# Patient Record
Sex: MALE | Race: WHITE | NOT HISPANIC OR LATINO | Employment: OTHER | URBAN - NONMETROPOLITAN AREA
[De-identification: names, ages, dates, MRNs, and addresses within clinical notes are randomized per-mention and may not be internally consistent; named-entity substitution may affect disease eponyms.]

---

## 2017-01-18 ENCOUNTER — OFFICE VISIT (OUTPATIENT)
Dept: INTERNAL MEDICINE | Facility: CLINIC | Age: 62
End: 2017-01-18

## 2017-01-18 ENCOUNTER — LAB (OUTPATIENT)
Dept: INTERNAL MEDICINE | Facility: CLINIC | Age: 62
End: 2017-01-18

## 2017-01-18 VITALS
DIASTOLIC BLOOD PRESSURE: 90 MMHG | TEMPERATURE: 98.2 F | OXYGEN SATURATION: 97 % | SYSTOLIC BLOOD PRESSURE: 150 MMHG | HEART RATE: 81 BPM

## 2017-01-18 DIAGNOSIS — E55.9 VITAMIN D DEFICIENCY: ICD-10-CM

## 2017-01-18 DIAGNOSIS — J00 ACUTE NASOPHARYNGITIS: Primary | ICD-10-CM

## 2017-01-18 DIAGNOSIS — E78.5 HYPERLIPIDEMIA, UNSPECIFIED HYPERLIPIDEMIA TYPE: Primary | ICD-10-CM

## 2017-01-18 DIAGNOSIS — I10 ESSENTIAL HYPERTENSION: ICD-10-CM

## 2017-01-18 LAB
25(OH)D3+25(OH)D2 SERPL-MCNC: 44.7 NG/ML
ALBUMIN SERPL-MCNC: 4.2 G/DL (ref 3.2–4.8)
ALBUMIN/GLOB SERPL: 1.5 G/DL (ref 1.5–2.5)
ALP SERPL-CCNC: 64 U/L (ref 25–100)
ALT SERPL-CCNC: 22 U/L (ref 7–40)
AST SERPL-CCNC: 21 U/L (ref 0–33)
BASOPHILS # BLD AUTO: 0.03 10*3/MM3 (ref 0–0.2)
BASOPHILS NFR BLD AUTO: 0.4 % (ref 0–1)
BILIRUB SERPL-MCNC: 0.8 MG/DL (ref 0.3–1.2)
BUN SERPL-MCNC: 9 MG/DL (ref 9–23)
BUN/CREAT SERPL: 12.9 (ref 7–25)
CALCIUM SERPL-MCNC: 9.6 MG/DL (ref 8.7–10.4)
CHLORIDE SERPL-SCNC: 103 MMOL/L (ref 99–109)
CHOLEST SERPL-MCNC: 138 MG/DL (ref 0–200)
CO2 SERPL-SCNC: 27 MMOL/L (ref 20–31)
CREAT SERPL-MCNC: 0.7 MG/DL (ref 0.6–1.3)
EOSINOPHIL # BLD AUTO: 0.17 10*3/MM3 (ref 0.1–0.3)
EOSINOPHIL NFR BLD AUTO: 2.2 % (ref 0–3)
ERYTHROCYTE [DISTWIDTH] IN BLOOD BY AUTOMATED COUNT: 13.3 % (ref 11.3–14.5)
GLOBULIN SER CALC-MCNC: 2.8 GM/DL
GLUCOSE SERPL-MCNC: 92 MG/DL (ref 70–100)
HCT VFR BLD AUTO: 47.3 % (ref 38.9–50.9)
HDLC SERPL-MCNC: 83 MG/DL (ref 40–60)
HGB BLD-MCNC: 16.8 G/DL (ref 13.1–17.5)
IMM GRANULOCYTES # BLD: 0.02 10*3/MM3 (ref 0–0.03)
IMM GRANULOCYTES NFR BLD: 0.3 % (ref 0–0.6)
LDLC SERPL CALC-MCNC: 47 MG/DL (ref 0–100)
LYMPHOCYTES # BLD AUTO: 1.93 10*3/MM3 (ref 0.6–4.8)
LYMPHOCYTES NFR BLD AUTO: 25.4 % (ref 24–44)
MCH RBC QN AUTO: 35.2 PG (ref 27–31)
MCHC RBC AUTO-ENTMCNC: 35.5 G/DL (ref 32–36)
MCV RBC AUTO: 99.2 FL (ref 80–99)
MONOCYTES # BLD AUTO: 0.62 10*3/MM3 (ref 0–1)
MONOCYTES NFR BLD AUTO: 8.2 % (ref 0–12)
NEUTROPHILS # BLD AUTO: 4.83 10*3/MM3 (ref 1.5–8.3)
NEUTROPHILS NFR BLD AUTO: 63.5 % (ref 41–71)
PLATELET # BLD AUTO: 272 10*3/MM3 (ref 150–450)
POTASSIUM SERPL-SCNC: 5 MMOL/L (ref 3.5–5.5)
PROT SERPL-MCNC: 7 G/DL (ref 5.7–8.2)
RBC # BLD AUTO: 4.77 10*6/MM3 (ref 4.2–5.76)
SODIUM SERPL-SCNC: 145 MMOL/L (ref 132–146)
TRIGL SERPL-MCNC: 39 MG/DL (ref 0–150)
VLDLC SERPL CALC-MCNC: 7.8 MG/DL
WBC # BLD AUTO: 7.6 10*3/MM3 (ref 3.5–10.8)

## 2017-01-18 PROCEDURE — 99213 OFFICE O/P EST LOW 20 MIN: CPT | Performed by: INTERNAL MEDICINE

## 2017-01-18 PROCEDURE — 36415 COLL VENOUS BLD VENIPUNCTURE: CPT | Performed by: INTERNAL MEDICINE

## 2017-01-18 NOTE — MR AVS SNAPSHOT
Lebron Miller   1/18/2017 2:45 PM   Office Visit    Provider:  Marilyn K Vermeesch, MD   Department:  [facility] PRIMARY CARE   Dept Phone:  [phone]                Your Full Care Plan              Your Updated Medication List          This list is accurate as of: 1/18/17  4:41 PM.  Always use your most recent med list.                aspirin 81 MG tablet       carvedilol 25 MG tablet   Commonly known as:  COREG   TAKE 1 TABLET TWICE DAILY, WITH MORNING AND EVENING MEAL       CloNIDine 0.2 MG tablet   Commonly known as:  CATAPRES   TAKE 1 TABLET BY MOUTH EVERY DAY AT BEDTIME       clopidogrel 75 MG tablet   Commonly known as:  PLAVIX   TAKE 1 TABLET BY MOUTH EVERY DAY       famotidine 20 MG tablet   Commonly known as:  PEPCID   Take 1 tablet by mouth Daily.       hydrochlorothiazide 25 MG tablet   Commonly known as:  HYDRODIURIL   TAKE 1 TABELT BY MOUTH EVERY DAY       lisinopril 40 MG tablet   Commonly known as:  PRINIVIL,ZESTRIL   TAKE ONE TABLET BY MOUTH EVERY DAY       sertraline 50 MG tablet   Commonly known as:  ZOLOFT   TAKE 1 TABLET BY MOUTH EVERY DAY       simvastatin 80 MG tablet   Commonly known as:  ZOCOR   Take 1 tablet by mouth Every Night.       traMADol 50 MG tablet   Commonly known as:  ULTRAM   TAKE 1 TABLET BY MOUTH THREE TIMES A DAY AS NEEDED FOR PAIN       Vitamin D 2000 UNITS tablet               You Were Diagnosed With        Codes Comments    Acute nasopharyngitis    -  Primary ICD-10-CM: J00  ICD-9-CM: 460       Instructions     None    Patient Instructions History      MyChart Signup     Our records indicate that [facility] have [facility]. If you would like to sign up for [facility], please email [email or web address] or call [phone] to obtain an activation code.             Other Info from Your Visit           Your Appointments     Apr 18, 2017  2:15 PM EDT   Follow Up with Marilyn K Vermeesch, MD   [facility] (--)    [address] [address] [phone]           Arrive 15 minutes prior to appointment.              Allergies     No Known Allergies      Reason for Visit     Nasal Congestion sinus pressure in face, yellow mucus, and right ear pain X 1 week.       Vital Signs     Blood Pressure Pulse Temperature Oxygen Saturation Smoking Status       150/90 81 98.2 °F (36.8 °C) 97% Current Every Day Smoker       Problems and Diagnoses Noted     Acute nasopharyngitis

## 2017-01-18 NOTE — PROGRESS NOTES
Chief Complaint   Patient presents with   • Nasal Congestion     sinus pressure in face, yellow mucus, and right ear pain X 1 week.      Leonel Miller is a 61 y.o. male.     HPI Comments: 1 week history of sinus congestion, pressure, EA on right side.  No cough.  No NVD.  No ST. Only had right cheek pain and pressure.  No fevers, chills or aches.  Symptoms are all starting to improve.  He did not take anything for sxs.            The following portions of the patient's history were reviewed and updated as appropriate: allergies, current medications, past family history, past medical history, past social history, past surgical history and problem list.    Review of Systems   Constitutional: Negative for chills and fever.   HENT: Positive for congestion, ear pain, postnasal drip and sinus pressure. Negative for sore throat.    Respiratory: Negative for cough.        Objective   Visit Vitals   • /90   • Pulse 81   • Temp 98.2 °F (36.8 °C)   • SpO2 97%     There is no height or weight on file to calculate BMI.  Physical Exam   Constitutional: He is oriented to person, place, and time. He appears well-developed and well-nourished.   HENT:   Head: Normocephalic and atraumatic.   Mouth/Throat: Oropharynx is clear and moist.   Turbinates red and inflamed with yellow rhinorrhea noted, white postnasal drip noted, no sinus tenderness to palpation, right tympanic membrane is slightly red and dull, left tympanic membrane is normal   Eyes: Conjunctivae and EOM are normal. Pupils are equal, round, and reactive to light.   Neck: Normal range of motion. Neck supple. No thyromegaly present.   Cardiovascular: Normal rate, regular rhythm and normal heart sounds.    No murmur heard.  Pulmonary/Chest: Effort normal and breath sounds normal. No respiratory distress.   Lymphadenopathy:     He has no cervical adenopathy.   Neurological: He is alert and oriented to person, place, and time. No cranial nerve deficit.    Psychiatric: He has a normal mood and affect. Judgment normal.   Nursing note and vitals reviewed.      Assessment/Plan   Lebron Miller is here today and the following problems have been addressed:      Lebron was seen today for nasal congestion.    Diagnoses and all orders for this visit:    Acute nasopharyngitis    Recommend coricidin HBP for current sxs  He needs to take meds today, has not taken yet and BP is elevated  Increase fluids and rest  If symptoms recur or earache worsens, he is to call clinic and I will call in amoxicillin    RTC if sxs worsen or persist

## 2017-02-20 RX ORDER — TRAMADOL HYDROCHLORIDE 50 MG/1
TABLET ORAL
Qty: 90 TABLET | Refills: 0 | Status: SHIPPED | OUTPATIENT
Start: 2017-02-20 | End: 2017-03-15

## 2017-03-16 RX ORDER — TRAMADOL HYDROCHLORIDE 50 MG/1
TABLET ORAL
Qty: 90 TABLET | Refills: 2 | Status: SHIPPED | OUTPATIENT
Start: 2017-03-16 | End: 2017-05-25

## 2017-03-28 RX ORDER — CLOPIDOGREL BISULFATE 75 MG/1
TABLET ORAL
Qty: 30 TABLET | Refills: 4 | Status: SHIPPED | OUTPATIENT
Start: 2017-03-28 | End: 2017-08-29

## 2017-03-28 RX ORDER — HYDROCHLOROTHIAZIDE 25 MG/1
TABLET ORAL
Qty: 30 TABLET | Refills: 4 | Status: SHIPPED | OUTPATIENT
Start: 2017-03-28 | End: 2017-08-29

## 2017-03-28 RX ORDER — CARVEDILOL 25 MG/1
TABLET ORAL
Qty: 60 TABLET | Refills: 4 | Status: SHIPPED | OUTPATIENT
Start: 2017-03-28 | End: 2017-08-29

## 2017-04-26 RX ORDER — LISINOPRIL 40 MG/1
TABLET ORAL
Qty: 30 TABLET | Refills: 6 | Status: SHIPPED | OUTPATIENT
Start: 2017-04-26 | End: 2017-11-30 | Stop reason: SDUPTHER

## 2017-05-04 ENCOUNTER — OFFICE VISIT (OUTPATIENT)
Dept: INTERNAL MEDICINE | Facility: CLINIC | Age: 62
End: 2017-05-04

## 2017-05-04 VITALS
HEIGHT: 68 IN | WEIGHT: 169 LBS | TEMPERATURE: 98.8 F | OXYGEN SATURATION: 98 % | BODY MASS INDEX: 25.61 KG/M2 | HEART RATE: 72 BPM | SYSTOLIC BLOOD PRESSURE: 152 MMHG | DIASTOLIC BLOOD PRESSURE: 103 MMHG

## 2017-05-04 DIAGNOSIS — I10 ESSENTIAL HYPERTENSION: Primary | ICD-10-CM

## 2017-05-04 DIAGNOSIS — E55.9 VITAMIN D DEFICIENCY: ICD-10-CM

## 2017-05-04 DIAGNOSIS — F32.1 MODERATE SINGLE CURRENT EPISODE OF MAJOR DEPRESSIVE DISORDER (HCC): ICD-10-CM

## 2017-05-04 DIAGNOSIS — Z72.0 TOBACCO ABUSE: ICD-10-CM

## 2017-05-04 DIAGNOSIS — E78.2 MIXED HYPERLIPIDEMIA: ICD-10-CM

## 2017-05-04 DIAGNOSIS — I25.10 CHRONIC CORONARY ARTERY DISEASE: ICD-10-CM

## 2017-05-04 PROBLEM — J00 ACUTE NASOPHARYNGITIS: Status: RESOLVED | Noted: 2017-01-18 | Resolved: 2017-05-04

## 2017-05-04 PROCEDURE — 99214 OFFICE O/P EST MOD 30 MIN: CPT | Performed by: INTERNAL MEDICINE

## 2017-05-25 RX ORDER — FAMOTIDINE 20 MG/1
20 TABLET, FILM COATED ORAL DAILY
Qty: 30 TABLET | Refills: 11 | Status: SHIPPED | OUTPATIENT
Start: 2017-05-25 | End: 2018-06-04 | Stop reason: SDUPTHER

## 2017-05-25 RX ORDER — SIMVASTATIN 80 MG
80 TABLET ORAL NIGHTLY
Qty: 30 TABLET | Refills: 5 | Status: SHIPPED | OUTPATIENT
Start: 2017-05-25 | End: 2018-01-02 | Stop reason: SDUPTHER

## 2017-05-25 RX ORDER — TRAMADOL HYDROCHLORIDE 50 MG/1
TABLET ORAL
Qty: 90 TABLET | Refills: 2 | Status: SHIPPED | OUTPATIENT
Start: 2017-05-25 | End: 2017-05-26 | Stop reason: SDUPTHER

## 2017-05-26 RX ORDER — TRAMADOL HYDROCHLORIDE 50 MG/1
TABLET ORAL
Qty: 90 TABLET | Refills: 2 | OUTPATIENT
Start: 2017-05-26 | End: 2017-09-25 | Stop reason: SDUPTHER

## 2017-06-15 ENCOUNTER — OFFICE VISIT (OUTPATIENT)
Dept: INTERNAL MEDICINE | Facility: CLINIC | Age: 62
End: 2017-06-15

## 2017-06-15 VITALS
HEART RATE: 86 BPM | TEMPERATURE: 98.6 F | HEIGHT: 68 IN | BODY MASS INDEX: 25.33 KG/M2 | SYSTOLIC BLOOD PRESSURE: 148 MMHG | DIASTOLIC BLOOD PRESSURE: 90 MMHG | OXYGEN SATURATION: 98 % | WEIGHT: 167.13 LBS

## 2017-06-15 DIAGNOSIS — I10 ESSENTIAL HYPERTENSION: Primary | ICD-10-CM

## 2017-06-15 PROCEDURE — 99213 OFFICE O/P EST LOW 20 MIN: CPT | Performed by: INTERNAL MEDICINE

## 2017-07-11 RX ORDER — CLONIDINE HYDROCHLORIDE 0.2 MG/1
TABLET ORAL
Qty: 30 TABLET | Refills: 8 | Status: SHIPPED | OUTPATIENT
Start: 2017-07-11 | End: 2018-02-28

## 2017-08-29 RX ORDER — HYDROCHLOROTHIAZIDE 25 MG/1
TABLET ORAL
Qty: 30 TABLET | Refills: 4 | Status: SHIPPED | OUTPATIENT
Start: 2017-08-29 | End: 2018-02-01 | Stop reason: SDUPTHER

## 2017-08-29 RX ORDER — CLOPIDOGREL BISULFATE 75 MG/1
TABLET ORAL
Qty: 30 TABLET | Refills: 4 | Status: SHIPPED | OUTPATIENT
Start: 2017-08-29 | End: 2018-02-01 | Stop reason: SDUPTHER

## 2017-08-29 RX ORDER — CARVEDILOL 25 MG/1
TABLET ORAL
Qty: 60 TABLET | Refills: 4 | Status: SHIPPED | OUTPATIENT
Start: 2017-08-29 | End: 2018-02-01 | Stop reason: SDUPTHER

## 2017-08-31 ENCOUNTER — OFFICE VISIT (OUTPATIENT)
Dept: INTERNAL MEDICINE | Facility: CLINIC | Age: 62
End: 2017-08-31

## 2017-08-31 VITALS
DIASTOLIC BLOOD PRESSURE: 92 MMHG | HEART RATE: 68 BPM | SYSTOLIC BLOOD PRESSURE: 148 MMHG | HEIGHT: 68 IN | TEMPERATURE: 97.4 F | OXYGEN SATURATION: 97 % | WEIGHT: 168 LBS | BODY MASS INDEX: 25.46 KG/M2

## 2017-08-31 DIAGNOSIS — F32.1 MODERATE SINGLE CURRENT EPISODE OF MAJOR DEPRESSIVE DISORDER (HCC): ICD-10-CM

## 2017-08-31 DIAGNOSIS — R35.1 NOCTURIA: ICD-10-CM

## 2017-08-31 DIAGNOSIS — I10 ESSENTIAL HYPERTENSION: Primary | ICD-10-CM

## 2017-08-31 DIAGNOSIS — E55.9 VITAMIN D DEFICIENCY: ICD-10-CM

## 2017-08-31 DIAGNOSIS — J30.9 ATOPIC RHINITIS: ICD-10-CM

## 2017-08-31 DIAGNOSIS — E78.2 MIXED HYPERLIPIDEMIA: ICD-10-CM

## 2017-08-31 DIAGNOSIS — Z12.5 SCREENING PSA (PROSTATE SPECIFIC ANTIGEN): ICD-10-CM

## 2017-08-31 LAB — PSA SERPL-MCNC: 1.41 NG/ML (ref 0.06–4)

## 2017-08-31 PROCEDURE — 99213 OFFICE O/P EST LOW 20 MIN: CPT | Performed by: INTERNAL MEDICINE

## 2017-09-26 RX ORDER — TRAMADOL HYDROCHLORIDE 50 MG/1
TABLET ORAL
Qty: 90 TABLET | Refills: 2 | OUTPATIENT
Start: 2017-09-26 | End: 2017-09-28 | Stop reason: SDUPTHER

## 2017-09-27 RX ORDER — TRAMADOL HYDROCHLORIDE 50 MG/1
TABLET ORAL
Qty: 90 TABLET | Refills: 2 | Status: CANCELLED | OUTPATIENT
Start: 2017-09-27

## 2017-09-28 RX ORDER — TRAMADOL HYDROCHLORIDE 50 MG/1
50 TABLET ORAL 3 TIMES DAILY PRN
Qty: 90 TABLET | Refills: 2 | OUTPATIENT
Start: 2017-09-28 | End: 2018-01-29 | Stop reason: SDUPTHER

## 2017-11-30 RX ORDER — LISINOPRIL 40 MG/1
TABLET ORAL
Qty: 30 TABLET | Refills: 6 | Status: SHIPPED | OUTPATIENT
Start: 2017-11-30 | End: 2018-07-03 | Stop reason: SDUPTHER

## 2018-01-02 RX ORDER — SIMVASTATIN 80 MG
TABLET ORAL
Qty: 30 TABLET | Refills: 5 | Status: SHIPPED | OUTPATIENT
Start: 2018-01-02 | End: 2018-06-04 | Stop reason: SDUPTHER

## 2018-01-31 RX ORDER — TRAMADOL HYDROCHLORIDE 50 MG/1
TABLET ORAL
Qty: 90 TABLET | Refills: 2 | OUTPATIENT
Start: 2018-01-31 | End: 2018-02-28 | Stop reason: SDUPTHER

## 2018-02-02 RX ORDER — HYDROCHLOROTHIAZIDE 25 MG/1
TABLET ORAL
Qty: 30 TABLET | Refills: 4 | Status: SHIPPED | OUTPATIENT
Start: 2018-02-02 | End: 2018-07-03 | Stop reason: SDUPTHER

## 2018-02-02 RX ORDER — CARVEDILOL 25 MG/1
TABLET ORAL
Qty: 60 TABLET | Refills: 4 | Status: SHIPPED | OUTPATIENT
Start: 2018-02-02 | End: 2018-07-03 | Stop reason: SDUPTHER

## 2018-02-02 RX ORDER — CLOPIDOGREL BISULFATE 75 MG/1
TABLET ORAL
Qty: 30 TABLET | Refills: 4 | Status: SHIPPED | OUTPATIENT
Start: 2018-02-02 | End: 2018-07-03 | Stop reason: SDUPTHER

## 2018-02-28 ENCOUNTER — OFFICE VISIT (OUTPATIENT)
Dept: INTERNAL MEDICINE | Facility: CLINIC | Age: 63
End: 2018-02-28

## 2018-02-28 VITALS
OXYGEN SATURATION: 97 % | TEMPERATURE: 98.6 F | DIASTOLIC BLOOD PRESSURE: 80 MMHG | WEIGHT: 169 LBS | SYSTOLIC BLOOD PRESSURE: 148 MMHG | HEART RATE: 73 BPM | BODY MASS INDEX: 25.61 KG/M2 | HEIGHT: 68 IN

## 2018-02-28 DIAGNOSIS — I25.10 CHRONIC CORONARY ARTERY DISEASE: ICD-10-CM

## 2018-02-28 DIAGNOSIS — E78.2 MIXED HYPERLIPIDEMIA: ICD-10-CM

## 2018-02-28 DIAGNOSIS — E55.9 VITAMIN D DEFICIENCY: ICD-10-CM

## 2018-02-28 DIAGNOSIS — I10 ESSENTIAL HYPERTENSION: Primary | ICD-10-CM

## 2018-02-28 PROCEDURE — 99213 OFFICE O/P EST LOW 20 MIN: CPT | Performed by: INTERNAL MEDICINE

## 2018-02-28 NOTE — PROGRESS NOTES
Chief Complaint   Patient presents with   • Follow-up     6 months for HLD and HTN. No new complaints.     Subjective   Lebron Miller is a 63 y.o. male.     HPI Comments: Here for follow up of HTN, HLD, GERD, depression, CAD, allergies and cervical disc disease.   No CP, SOB or edema.  BP is elevated again.  He is not checking it at all anymore.   He has sinus drainage in AM produces a significant amount of mucus with cough.  He is smoking about 1-2 PPD.  He is considering quitting smoking.   Depression has been stable.  He never sleeps well, says he has done this his whole life.   He awakens to urinate 3 times.  No dribbling and has a good stream.  He tends to drink a lot before bed at night.   Drinks beer 2-3 nights a week.   He does have some neck pain, hand pain.  Takes tramadol 1 tab TID.  No signs of abuse.   He does not eat fast food.  Usually eats at home.  Loves his vegetables.   He does not exercise much.   Patient states that he used one of his friend's inhalers, but does not know what it was.  States that his breathing was improved within a few minutes.  He has not had a chest x-ray for quite some time and declines for me to do one at this time.  I offered to write for an albuterol inhaler, but he declines at this time.       The following portions of the patient's history were reviewed and updated as appropriate: allergies, current medications, past family history, past medical history, past social history, past surgical history and problem list.    Review of Systems   HENT: Positive for postnasal drip.    Respiratory: Positive for cough.    Cardiovascular: Negative for chest pain, palpitations and leg swelling.   Musculoskeletal: Positive for back pain and neck pain.   Psychiatric/Behavioral: Positive for sleep disturbance. Negative for dysphoric mood. The patient is not nervous/anxious.    All other systems reviewed and are negative.      Objective   /80  Pulse 73  Temp 98.6 °F (37 °C)  Ht  "172.7 cm (68\")  Wt 76.7 kg (169 lb)  SpO2 97%  BMI 25.7 kg/m2  Body mass index is 25.7 kg/(m^2).  Physical Exam   Constitutional: He is oriented to person, place, and time. He appears well-developed and well-nourished.   HENT:   Head: Normocephalic and atraumatic.   Mouth/Throat: Oropharynx is clear and moist.   Eyes: Conjunctivae and EOM are normal. Pupils are equal, round, and reactive to light.   Neck: Normal range of motion. Neck supple. No thyromegaly present.   Cardiovascular: Normal rate, regular rhythm, normal heart sounds and intact distal pulses.    No murmur heard.  Pulmonary/Chest: Effort normal. No respiratory distress. He has no wheezes. He has no rales.   Diminished breath sounds throughout   Abdominal: Soft. Bowel sounds are normal.   Musculoskeletal: He exhibits no edema.   Lymphadenopathy:     He has no cervical adenopathy.   Neurological: He is alert and oriented to person, place, and time. No cranial nerve deficit.   Psychiatric: Judgment normal.   Flat affect today, patient does not want to make much eye contact   Nursing note and vitals reviewed.      Assessment/Plan   Lebron Miller is here today and the following problems have been addressed:      Lebron was seen today for follow-up.    Diagnoses and all orders for this visit:    Essential hypertension  -     CBC & Differential  -     Comprehensive Metabolic Panel    Mixed hyperlipidemia  -     Comprehensive Metabolic Panel  -     Lipid Panel    Chronic coronary artery disease    Vitamin D deficiency  -     Vitamin D 25 Hydroxy    Other orders  -     CloNIDine (CATAPRES-TTS) 0.2 MG/24HR patch; Place 1 patch on the skin 1 (One) Time Per Week.    Follow heart healthy/low salt diet  Avoid processed foods  Monitor blood pressure as discussed, and bring readings to next office visit  Exercise as tolerated up to 30 minutes 5 days per week  Take all medications as prescribed  Labs as noted  Discontinue clonidine and start catapress patch .2mg once " a week due to elevated blood pressures  He will work on smoking cessation with patches  Offered to do chest x-ray and start inhaler, but patient declined at this time    RTC 4 weeks    Please note that portions of this note were completed with a voice recognition program.  Efforts were made to edit dictation, but occasionally words are mistranscribed.

## 2018-03-29 LAB
25(OH)D3+25(OH)D2 SERPL-MCNC: 41.6 NG/ML
ALBUMIN SERPL-MCNC: 4 G/DL (ref 3.5–5)
ALBUMIN/GLOB SERPL: 1.5 G/DL (ref 1–2)
ALP SERPL-CCNC: 52 U/L (ref 38–126)
ALT SERPL-CCNC: 28 U/L (ref 13–69)
AST SERPL-CCNC: 20 U/L (ref 15–46)
BASOPHILS # BLD AUTO: 0.05 10*3/MM3 (ref 0–0.2)
BASOPHILS NFR BLD AUTO: 0.8 % (ref 0–2.5)
BILIRUB SERPL-MCNC: 1 MG/DL (ref 0.2–1.3)
BUN SERPL-MCNC: 14 MG/DL (ref 7–20)
BUN/CREAT SERPL: 17.5 (ref 6.3–21.9)
CALCIUM SERPL-MCNC: 9.4 MG/DL (ref 8.4–10.2)
CHLORIDE SERPL-SCNC: 102 MMOL/L (ref 98–107)
CHOLEST SERPL-MCNC: 125 MG/DL (ref 0–199)
CO2 SERPL-SCNC: 29 MMOL/L (ref 26–30)
CREAT SERPL-MCNC: 0.8 MG/DL (ref 0.6–1.3)
EOSINOPHIL # BLD AUTO: 0.26 10*3/MM3 (ref 0–0.7)
EOSINOPHIL NFR BLD AUTO: 4.1 % (ref 0–7)
ERYTHROCYTE [DISTWIDTH] IN BLOOD BY AUTOMATED COUNT: 12.8 % (ref 11.5–14.5)
GFR SERPLBLD CREATININE-BSD FMLA CKD-EPI: 118 ML/MIN/1.73
GFR SERPLBLD CREATININE-BSD FMLA CKD-EPI: 98 ML/MIN/1.73
GLOBULIN SER CALC-MCNC: 2.7 GM/DL
GLUCOSE SERPL-MCNC: 90 MG/DL (ref 74–98)
HCT VFR BLD AUTO: 45.8 % (ref 42–52)
HDLC SERPL-MCNC: 86 MG/DL (ref 40–60)
HGB BLD-MCNC: 16.5 G/DL (ref 14–18)
IMM GRANULOCYTES # BLD: 0.03 10*3/MM3 (ref 0–0.06)
IMM GRANULOCYTES NFR BLD: 0.5 % (ref 0–0.6)
LDLC SERPL CALC-MCNC: 32 MG/DL (ref 0–99)
LYMPHOCYTES # BLD AUTO: 1.62 10*3/MM3 (ref 0.6–3.4)
LYMPHOCYTES NFR BLD AUTO: 25.6 % (ref 10–50)
MCH RBC QN AUTO: 35 PG (ref 27–31)
MCHC RBC AUTO-ENTMCNC: 36 G/DL (ref 30–37)
MCV RBC AUTO: 97 FL (ref 80–94)
MONOCYTES # BLD AUTO: 0.59 10*3/MM3 (ref 0–0.9)
MONOCYTES NFR BLD AUTO: 9.3 % (ref 0–12)
NEUTROPHILS # BLD AUTO: 3.79 10*3/MM3 (ref 2–6.9)
NEUTROPHILS NFR BLD AUTO: 59.7 % (ref 37–80)
NRBC BLD AUTO-RTO: 0 /100 WBC (ref 0–0)
PLATELET # BLD AUTO: 264 10*3/MM3 (ref 130–400)
POTASSIUM SERPL-SCNC: 5.4 MMOL/L (ref 3.5–5.1)
PROT SERPL-MCNC: 6.7 G/DL (ref 6.3–8.2)
RBC # BLD AUTO: 4.72 10*6/MM3 (ref 4.7–6.1)
SODIUM SERPL-SCNC: 142 MMOL/L (ref 137–145)
TRIGL SERPL-MCNC: 36 MG/DL
VLDLC SERPL CALC-MCNC: 7.2 MG/DL
WBC # BLD AUTO: 6.34 10*3/MM3 (ref 4.8–10.8)

## 2018-04-24 ENCOUNTER — OFFICE VISIT (OUTPATIENT)
Dept: INTERNAL MEDICINE | Facility: CLINIC | Age: 63
End: 2018-04-24

## 2018-04-24 VITALS
BODY MASS INDEX: 25.97 KG/M2 | OXYGEN SATURATION: 96 % | DIASTOLIC BLOOD PRESSURE: 76 MMHG | HEIGHT: 68 IN | HEART RATE: 64 BPM | SYSTOLIC BLOOD PRESSURE: 142 MMHG | WEIGHT: 171.38 LBS | TEMPERATURE: 98.3 F

## 2018-04-24 DIAGNOSIS — I10 ESSENTIAL HYPERTENSION: Primary | ICD-10-CM

## 2018-04-24 PROCEDURE — 99213 OFFICE O/P EST LOW 20 MIN: CPT | Performed by: INTERNAL MEDICINE

## 2018-04-24 NOTE — PROGRESS NOTES
"Chief Complaint   Patient presents with   • Follow-up     4 weeks for HTN. No new complaints      Subjective   Lebron Miller is a 63 y.o. male.     Here for follow up of HTN.   Last visit we changed from clonidine to catapress patch.   He has not been checking his BP.  He is feeling well overall.  He denies any CP or SOB.  No edema.    He continues to smoke over a PPD.   Remains on zoloft for anxiety.  His family does stress him at times.  He sleeps fairly well, awakens 1-2 times a night.          The following portions of the patient's history were reviewed and updated as appropriate: allergies, current medications, past family history, past medical history, past social history, past surgical history and problem list.    Review of Systems   Respiratory: Negative for shortness of breath.         Occasional wheezing and chest tightness due to smoking   Cardiovascular: Negative for chest pain, palpitations and leg swelling.   Psychiatric/Behavioral: Negative for sleep disturbance. The patient is nervous/anxious.        Objective   /76   Pulse 64   Temp 98.3 °F (36.8 °C)   Ht 172.7 cm (68\")   Wt 77.7 kg (171 lb 6 oz)   SpO2 96%   BMI 26.06 kg/m²   Body mass index is 26.06 kg/m².  Physical Exam   Constitutional: He is oriented to person, place, and time. He appears well-developed and well-nourished.   HENT:   Head: Normocephalic and atraumatic.   Cardiovascular: Normal rate, regular rhythm, normal heart sounds and intact distal pulses.    Pulmonary/Chest: Effort normal.   Occasional rhonchi noted   Musculoskeletal: He exhibits no edema.   Neurological: He is alert and oriented to person, place, and time.   Psychiatric: He has a normal mood and affect. His behavior is normal. Judgment and thought content normal.   Nursing note and vitals reviewed.      Assessment/Plan   Lebron Miller is here today and the following problems have been addressed:      Lebron was seen today for follow-up.    Diagnoses and all " orders for this visit:    Essential hypertension    Other orders  -     CloNIDine (CATAPRES-TTS) 0.3 MG/24HR patch; Place 1 patch on the skin 1 (One) Time Per Week.    Follow heart healthy/low salt diet  Avoid processed foods  Monitor blood pressure as discussed  Exercise as tolerated up to 30 minutes 5 days per week  Take all medications as prescribed  Increase catapress patch to .3mg q week  Reviewed recent labs with pt    RTC 4 mo  Please note that portions of this note were completed with a voice recognition program.  Efforts were made to edit dictation, but occasionally words are mistranscribed.

## 2018-05-01 RX ORDER — TRAMADOL HYDROCHLORIDE 50 MG/1
50 TABLET ORAL 3 TIMES DAILY PRN
Qty: 90 TABLET | Refills: 2 | OUTPATIENT
Start: 2018-05-01 | End: 2018-09-19

## 2018-06-04 RX ORDER — SIMVASTATIN 80 MG
TABLET ORAL
Qty: 30 TABLET | Refills: 5 | Status: SHIPPED | OUTPATIENT
Start: 2018-06-04 | End: 2018-09-19 | Stop reason: SDUPTHER

## 2018-06-04 RX ORDER — FAMOTIDINE 20 MG/1
20 TABLET, FILM COATED ORAL DAILY
Qty: 30 TABLET | Refills: 11 | Status: SHIPPED | OUTPATIENT
Start: 2018-06-04 | End: 2018-09-19 | Stop reason: SDUPTHER

## 2018-07-03 RX ORDER — HYDROCHLOROTHIAZIDE 25 MG/1
TABLET ORAL
Qty: 30 TABLET | Refills: 4 | Status: SHIPPED | OUTPATIENT
Start: 2018-07-03 | End: 2018-09-19 | Stop reason: SDUPTHER

## 2018-07-03 RX ORDER — CARVEDILOL 25 MG/1
TABLET ORAL
Qty: 60 TABLET | Refills: 4 | Status: SHIPPED | OUTPATIENT
Start: 2018-07-03 | End: 2018-09-19 | Stop reason: SDUPTHER

## 2018-07-03 RX ORDER — LISINOPRIL 40 MG/1
TABLET ORAL
Qty: 30 TABLET | Refills: 6 | Status: SHIPPED | OUTPATIENT
Start: 2018-07-03 | End: 2018-09-19 | Stop reason: SDUPTHER

## 2018-07-03 RX ORDER — CLOPIDOGREL BISULFATE 75 MG/1
TABLET ORAL
Qty: 30 TABLET | Refills: 4 | Status: SHIPPED | OUTPATIENT
Start: 2018-07-03 | End: 2018-09-19 | Stop reason: SDUPTHER

## 2018-08-28 RX ORDER — TRAMADOL HYDROCHLORIDE 50 MG/1
50 TABLET ORAL 3 TIMES DAILY PRN
Qty: 90 TABLET | Refills: 2 | Status: CANCELLED | OUTPATIENT
Start: 2018-08-28

## 2018-08-30 RX ORDER — TRAMADOL HYDROCHLORIDE 50 MG/1
50 TABLET ORAL 3 TIMES DAILY PRN
Qty: 90 TABLET | Refills: 2 | Status: SHIPPED | OUTPATIENT
Start: 2018-08-30 | End: 2018-11-29

## 2018-09-19 ENCOUNTER — OFFICE VISIT (OUTPATIENT)
Dept: INTERNAL MEDICINE | Facility: CLINIC | Age: 63
End: 2018-09-19

## 2018-09-19 VITALS
HEART RATE: 72 BPM | BODY MASS INDEX: 24.86 KG/M2 | OXYGEN SATURATION: 96 % | WEIGHT: 164 LBS | SYSTOLIC BLOOD PRESSURE: 136 MMHG | TEMPERATURE: 97.2 F | DIASTOLIC BLOOD PRESSURE: 88 MMHG | HEIGHT: 68 IN

## 2018-09-19 DIAGNOSIS — J30.1 CHRONIC SEASONAL ALLERGIC RHINITIS DUE TO POLLEN: ICD-10-CM

## 2018-09-19 DIAGNOSIS — E78.2 MIXED HYPERLIPIDEMIA: ICD-10-CM

## 2018-09-19 DIAGNOSIS — E55.9 VITAMIN D DEFICIENCY: ICD-10-CM

## 2018-09-19 DIAGNOSIS — F32.1 MODERATE SINGLE CURRENT EPISODE OF MAJOR DEPRESSIVE DISORDER (HCC): ICD-10-CM

## 2018-09-19 DIAGNOSIS — I10 ESSENTIAL HYPERTENSION: Primary | ICD-10-CM

## 2018-09-19 PROCEDURE — 99213 OFFICE O/P EST LOW 20 MIN: CPT | Performed by: INTERNAL MEDICINE

## 2018-09-19 RX ORDER — LISINOPRIL 40 MG/1
TABLET ORAL
Qty: 30 TABLET | Refills: 6 | Status: SHIPPED | OUTPATIENT
Start: 2018-09-19 | End: 2019-04-30 | Stop reason: SDUPTHER

## 2018-09-19 RX ORDER — CARVEDILOL 25 MG/1
TABLET ORAL
Qty: 60 TABLET | Refills: 4 | Status: SHIPPED | OUTPATIENT
Start: 2018-09-19 | End: 2019-02-28 | Stop reason: SDUPTHER

## 2018-09-19 RX ORDER — CLOPIDOGREL BISULFATE 75 MG/1
TABLET ORAL
Qty: 30 TABLET | Refills: 4 | Status: SHIPPED | OUTPATIENT
Start: 2018-09-19 | End: 2019-02-28 | Stop reason: SDUPTHER

## 2018-09-19 RX ORDER — HYDROCHLOROTHIAZIDE 25 MG/1
TABLET ORAL
Qty: 30 TABLET | Refills: 4 | Status: SHIPPED | OUTPATIENT
Start: 2018-09-19 | End: 2019-02-28 | Stop reason: SDUPTHER

## 2018-09-19 RX ORDER — SIMVASTATIN 80 MG
TABLET ORAL
Qty: 30 TABLET | Refills: 5 | Status: SHIPPED | OUTPATIENT
Start: 2018-09-19 | End: 2019-04-01

## 2018-09-19 RX ORDER — FAMOTIDINE 20 MG/1
20 TABLET, FILM COATED ORAL DAILY
Qty: 30 TABLET | Refills: 11 | Status: SHIPPED | OUTPATIENT
Start: 2018-09-19 | End: 2019-09-30

## 2018-09-19 NOTE — PROGRESS NOTES
Chief Complaint   Patient presents with   • Follow-up     4 months for HLD and HTN. No new complaints.      Leonel Miller is a 63 y.o. male.     Here today for follow up of HTN, HLD, GERD, depression, CAD, allergies and cervical disc disease.   HTN/HLD/CAD- BP is slightly elevated today, he does not check it at home.  No CP, SOA, palpitations or edema.  He is compliant with meds.  Tries to eat a HH diet.  He continues to smoke one pack of cigarettes daily and is not interested in smoking cessation  Depression- this is well controlled on zoloft.  He is often stressed about his family.  His son remains in group home.  He has a granddaughter that is 19 years old and is hooked on drugs.  He is very worried about his grandchildren  GERD- he takes pepcid daily and this prevents any GERD sxs.  He was placed on pepcid to protect stomach from plavix  Allergies- his allergies have been bothersome for about 2 weeks.  He does not feel need to take anything yet  Neck pain- no current neck pain, if needed he takes tramadol for it  HCM- had pneumovax in 2016.  He will get flu shot next mo. He declines colonoscopy.          The following portions of the patient's history were reviewed and updated as appropriate: allergies, current medications, past family history, past medical history, past social history, past surgical history and problem list.    Review of Systems   Constitutional: Negative for activity change, appetite change and unexpected weight change.   HENT: Positive for postnasal drip.    Respiratory: Negative for shortness of breath.    Cardiovascular: Negative for chest pain, palpitations and leg swelling.   Gastrointestinal: Negative for abdominal pain.   Musculoskeletal: Positive for arthralgias and neck pain.   Allergic/Immunologic: Positive for environmental allergies.   Neurological: Negative for headaches.   Psychiatric/Behavioral: Negative for dysphoric mood and sleep disturbance. The patient is  "nervous/anxious.    All other systems reviewed and are negative.      Objective   /88   Pulse 72   Temp 97.2 °F (36.2 °C)   Ht 172.7 cm (68\")   Wt 74.4 kg (164 lb)   SpO2 96%   BMI 24.94 kg/m²   Body mass index is 24.94 kg/m².  Physical Exam   Constitutional: He is oriented to person, place, and time. He appears well-developed and well-nourished.   Very pleasant gentleman in no apparent distress   HENT:   Head: Normocephalic and atraumatic.   Right Ear: External ear normal.   Left Ear: External ear normal.   Mild erythema of oropharynx noted   Eyes: Pupils are equal, round, and reactive to light. Conjunctivae and EOM are normal.   Neck: Normal range of motion. Neck supple. No thyromegaly present.   Cardiovascular: Normal rate, regular rhythm, normal heart sounds and intact distal pulses.    No murmur heard.  Pulmonary/Chest: Effort normal and breath sounds normal. No respiratory distress. He has no wheezes. He has no rales.   Abdominal: Soft. Bowel sounds are normal. He exhibits no distension. There is no tenderness.   Musculoskeletal: Normal range of motion.   Lymphadenopathy:     He has no cervical adenopathy.   Neurological: He is alert and oriented to person, place, and time. No cranial nerve deficit. Coordination normal.   Psychiatric: He has a normal mood and affect. His behavior is normal. Judgment and thought content normal.   Nursing note and vitals reviewed.      Assessment/Plan   Lebron Miller is here today and the following problems have been addressed:      Lebron was seen today for follow-up.    Diagnoses and all orders for this visit:    Essential hypertension    Mixed hyperlipidemia    Chronic seasonal allergic rhinitis due to pollen    Vitamin D deficiency    Moderate single current episode of major depressive disorder (CMS/HCC)    Other orders  -     clopidogrel (PLAVIX) 75 MG tablet; TAKE 1 TABLET BY MOUTH EVERY DAY  -     sertraline (ZOLOFT) 50 MG tablet; TAKE 1 TABLET BY MOUTH EVERY " DAY  -     hydrochlorothiazide (HYDRODIURIL) 25 MG tablet; TAKE 1 TABLET BY MOUTH EVERY DAY  -     carvedilol (COREG) 25 MG tablet; TAKE 1 TABLET BY MOUTH TWICE DAILY, WITH MORNING AND EVENING MEAL  -     lisinopril (PRINIVIL,ZESTRIL) 40 MG tablet; Take 1 tablet by mouth once daily  -     simvastatin (ZOCOR) 80 MG tablet; TAKE 1 TABLET BY MOUTH DAILY AT BEDTIME  -     famotidine (PEPCID) 20 MG tablet; TAKE 1 TABLET BY MOUTH ONCE DAILY        Follow heart healthy/low salt diet  Avoid processed foods  Monitor blood pressure on occasion  Exercise as tolerated up to 30 minutes 5 days per week  Take all medications as prescribed  Labs are up-to-date  Patient encouraged to get flu shot here or at pharmacy within the next 1 month or so  Depression and anxiety are controlled with use of Zoloft  Continue Pepcid for protection of stomach with use of Plavix and aspirin  Recommend Zyrtec as needed for allergy symptoms  Continue daily Zocor  Use tramadol when necessary for neck pain and arthritis    Return in about 6 months (around 3/19/2019) for Next scheduled follow up.      Marilyn K. Vermeesch, MD      Please note that portions of this note were completed with a voice recognition program.  Efforts were made to edit dictation, but occasionally words are mistranscribed.

## 2018-11-29 RX ORDER — TRAMADOL HYDROCHLORIDE 50 MG/1
50 TABLET ORAL 3 TIMES DAILY PRN
Qty: 90 TABLET | Refills: 2 | Status: SHIPPED | OUTPATIENT
Start: 2018-11-29 | End: 2019-02-28 | Stop reason: SDUPTHER

## 2018-11-29 RX ORDER — TRAMADOL HYDROCHLORIDE 50 MG/1
50 TABLET ORAL 3 TIMES DAILY PRN
Qty: 90 TABLET | Refills: 2 | Status: CANCELLED | OUTPATIENT
Start: 2018-11-29

## 2019-02-28 RX ORDER — CLOPIDOGREL BISULFATE 75 MG/1
TABLET ORAL
Qty: 30 TABLET | Refills: 4 | Status: SHIPPED | OUTPATIENT
Start: 2019-02-28 | End: 2019-08-01 | Stop reason: SDUPTHER

## 2019-02-28 RX ORDER — CARVEDILOL 25 MG/1
TABLET ORAL
Qty: 60 TABLET | Refills: 4 | Status: SHIPPED | OUTPATIENT
Start: 2019-02-28 | End: 2019-08-01 | Stop reason: SDUPTHER

## 2019-02-28 RX ORDER — HYDROCHLOROTHIAZIDE 25 MG/1
TABLET ORAL
Qty: 30 TABLET | Refills: 4 | Status: SHIPPED | OUTPATIENT
Start: 2019-02-28 | End: 2019-08-01 | Stop reason: SDUPTHER

## 2019-02-28 RX ORDER — TRAMADOL HYDROCHLORIDE 50 MG/1
50 TABLET ORAL 3 TIMES DAILY PRN
Qty: 90 TABLET | Refills: 2 | Status: CANCELLED | OUTPATIENT
Start: 2019-02-28

## 2019-03-01 RX ORDER — TRAMADOL HYDROCHLORIDE 50 MG/1
50 TABLET ORAL 3 TIMES DAILY PRN
Qty: 90 TABLET | Refills: 2 | Status: SHIPPED | OUTPATIENT
Start: 2019-03-01 | End: 2019-06-03 | Stop reason: SDUPTHER

## 2019-04-01 RX ORDER — SIMVASTATIN 80 MG
TABLET ORAL
Qty: 30 TABLET | Refills: 5 | Status: SHIPPED | OUTPATIENT
Start: 2019-04-01 | End: 2019-09-30

## 2019-04-01 RX ORDER — SIMVASTATIN 80 MG
TABLET ORAL
Qty: 30 TABLET | Refills: 5 | Status: CANCELLED | OUTPATIENT
Start: 2019-04-01

## 2019-04-05 ENCOUNTER — OFFICE VISIT (OUTPATIENT)
Dept: INTERNAL MEDICINE | Facility: CLINIC | Age: 64
End: 2019-04-05

## 2019-04-05 VITALS
HEIGHT: 68 IN | TEMPERATURE: 98 F | HEART RATE: 71 BPM | OXYGEN SATURATION: 97 % | SYSTOLIC BLOOD PRESSURE: 130 MMHG | DIASTOLIC BLOOD PRESSURE: 88 MMHG | WEIGHT: 165 LBS | RESPIRATION RATE: 16 BRPM | BODY MASS INDEX: 25.01 KG/M2

## 2019-04-05 DIAGNOSIS — I25.10 CHRONIC CORONARY ARTERY DISEASE: ICD-10-CM

## 2019-04-05 DIAGNOSIS — Z12.5 SCREENING PSA (PROSTATE SPECIFIC ANTIGEN): ICD-10-CM

## 2019-04-05 DIAGNOSIS — E78.2 MIXED HYPERLIPIDEMIA: ICD-10-CM

## 2019-04-05 DIAGNOSIS — F32.1 MODERATE SINGLE CURRENT EPISODE OF MAJOR DEPRESSIVE DISORDER (HCC): ICD-10-CM

## 2019-04-05 DIAGNOSIS — E55.9 VITAMIN D DEFICIENCY: ICD-10-CM

## 2019-04-05 DIAGNOSIS — I10 ESSENTIAL HYPERTENSION: Primary | ICD-10-CM

## 2019-04-05 PROCEDURE — 99214 OFFICE O/P EST MOD 30 MIN: CPT | Performed by: INTERNAL MEDICINE

## 2019-04-05 RX ORDER — BUDESONIDE AND FORMOTEROL FUMARATE DIHYDRATE 80; 4.5 UG/1; UG/1
2 AEROSOL RESPIRATORY (INHALATION)
Qty: 10.2 G | Refills: 3 | Status: SHIPPED | OUTPATIENT
Start: 2019-04-05 | End: 2019-10-01 | Stop reason: SDUPTHER

## 2019-04-05 NOTE — PROGRESS NOTES
Chief Complaint   Patient presents with   • Follow-up     6 month HLD, HTN      Subjective   Lebron Miller is a 64 y.o. male.     Here today for follow up of HTN, HLD, GERD, depression, CAD, allergies and cervical disc disease.   HTN/HLD/CAD- BP is slightly elevated today, he does not check it at home.  No CP, SOA, palpitations or edema.  He avoids fast food, does not eat much bread. Eats a lot of greens.  No formal exercise, but he stays active  Depression- this is well controlled on zoloft.  He awakes a few times a night to urinate.  He is often stressed about his family.    GERD- he takes pepcid daily and this prevents any GERD sxs.   Allergies- his allergies have not been bothersome at this time  Neck/back pain- no current neck pain, if needed he takes tramadol for it 1-2 times a day  HCM- had pneumovax in 2016.  He declines colonoscopy.  He did get the Hep A vaccine.  Also had the shingrix vaccine.   Tobacco abuse- he has been using some symbicort from a friend and says it is very helpful.  He uses it on and off.  He continues to smoke about a PPD.  He is interested in quitting.  He is not interested in using any medications. He is thinking about trying to quit on his own.  He is not interested in the screening low dose CT scan.           The following portions of the patient's history were reviewed and updated as appropriate: allergies, current medications, past family history, past medical history, past social history, past surgical history and problem list.    Review of Systems   Constitutional: Negative for activity change, appetite change and unexpected weight change.   Eyes: Negative for visual disturbance.   Respiratory: Positive for shortness of breath.    Cardiovascular: Negative for chest pain, palpitations and leg swelling.   Gastrointestinal: Negative for abdominal pain.   Genitourinary:        Urinates once or twice at night   Musculoskeletal: Positive for back pain and neck pain.   Neurological:  "Negative for headaches.   Psychiatric/Behavioral: Negative for dysphoric mood and sleep disturbance. The patient is nervous/anxious.    All other systems reviewed and are negative.      Objective   /88   Pulse 71   Temp 98 °F (36.7 °C) (Temporal)   Resp 16   Ht 172.7 cm (68\")   Wt 74.8 kg (165 lb)   SpO2 97%   BMI 25.09 kg/m²   Body mass index is 25.09 kg/m².  Physical Exam   Constitutional: He is oriented to person, place, and time. He appears well-developed and well-nourished. No distress.   Very pleasant gentleman who appears older than his stated age, he appears tired today   HENT:   Head: Normocephalic and atraumatic.   Right Ear: External ear normal.   Left Ear: External ear normal.   Mouth/Throat: Oropharynx is clear and moist.   Eyes: Conjunctivae and EOM are normal. Pupils are equal, round, and reactive to light.   Neck: Normal range of motion. Neck supple. No thyromegaly present.   Cardiovascular: Normal rate, regular rhythm, normal heart sounds and intact distal pulses.   No murmur heard.  No carotid bruits   Pulmonary/Chest: Effort normal. No respiratory distress. He has no wheezes.   Decreased breath sounds throughout all lung fields   Abdominal: Soft. Bowel sounds are normal. He exhibits no distension. There is no tenderness.   Musculoskeletal: Normal range of motion. He exhibits no edema.   Lymphadenopathy:     He has no cervical adenopathy.   Neurological: He is alert and oriented to person, place, and time. No cranial nerve deficit. Coordination normal.   Psychiatric: He has a normal mood and affect. His behavior is normal. Judgment and thought content normal.   Nursing note and vitals reviewed.      Assessment/Plan   Lebron Miller is here today and the following problems have been addressed:      Lebron was seen today for follow-up.    Diagnoses and all orders for this visit:    Essential hypertension  -     CBC & Differential  -     Comprehensive Metabolic Panel  -     Lipid Panel With " / Chol / HDL Ratio    Mixed hyperlipidemia    Vitamin D deficiency  -     Vitamin D 25 Hydroxy    Screening PSA (prostate specific antigen)  -     PSA Screen    Moderate single current episode of major depressive disorder (CMS/HCC)    Chronic coronary artery disease    Other orders  -     budesonide-formoterol (SYMBICORT) 80-4.5 MCG/ACT inhaler; Inhale 2 puffs by mouth 2 (Two) Times a Day.        Follow heart healthy/low salt diet  Avoid processed foods  Monitor blood pressure as discussed  Exercise as tolerated   Take all medications as prescribed  Labs as noted  Given script for symbicort 80/4.5 2 puffs BID, rinse mouth well afterwards to prevent thrush  His vaccines are UTD  Encouragement and reassurance given to patient today regarding his current home stressors    Return in about 6 months (around 10/5/2019) for Next scheduled follow up.      Marilyn K. Vermeesch, MD      Please note that portions of this note were completed with a voice recognition program.  Efforts were made to edit dictation, but occasionally words are mistranscribed.

## 2019-04-26 RX ORDER — CLONIDINE 0.3 MG/24H
PATCH, EXTENDED RELEASE TRANSDERMAL
Qty: 4 EACH | Refills: 5 | Status: SHIPPED | OUTPATIENT
Start: 2019-04-26 | End: 2019-10-29 | Stop reason: SDUPTHER

## 2019-04-30 RX ORDER — LISINOPRIL 40 MG/1
TABLET ORAL
Qty: 30 TABLET | Refills: 6 | Status: SHIPPED | OUTPATIENT
Start: 2019-04-30 | End: 2019-11-05 | Stop reason: SDUPTHER

## 2019-06-04 RX ORDER — TRAMADOL HYDROCHLORIDE 50 MG/1
50 TABLET ORAL 3 TIMES DAILY PRN
Qty: 90 TABLET | Refills: 2 | Status: SHIPPED | OUTPATIENT
Start: 2019-06-04 | End: 2019-08-27

## 2019-07-29 LAB
25(OH)D3+25(OH)D2 SERPL-MCNC: 42.7 NG/ML
ALBUMIN SERPL-MCNC: 3.9 G/DL (ref 3.5–5)
ALBUMIN/GLOB SERPL: 1.4 G/DL (ref 1–2)
ALP SERPL-CCNC: 58 U/L (ref 38–126)
ALT SERPL-CCNC: 30 U/L (ref 13–69)
AST SERPL-CCNC: 24 U/L (ref 15–46)
BASOPHILS # BLD AUTO: 0.05 10*3/MM3 (ref 0–0.2)
BASOPHILS NFR BLD AUTO: 0.7 % (ref 0–1.5)
BILIRUB SERPL-MCNC: 0.9 MG/DL (ref 0.2–1.3)
BUN SERPL-MCNC: 12 MG/DL (ref 7–20)
BUN/CREAT SERPL: 17.1 (ref 6.3–21.9)
CALCIUM SERPL-MCNC: 9.3 MG/DL (ref 8.4–10.2)
CHLORIDE SERPL-SCNC: 100 MMOL/L (ref 98–107)
CHOLEST SERPL-MCNC: 132 MG/DL (ref 0–199)
CHOLEST/HDLC SERPL: 1.61 {RATIO}
CO2 SERPL-SCNC: 30 MMOL/L (ref 26–30)
CREAT SERPL-MCNC: 0.7 MG/DL (ref 0.6–1.3)
EOSINOPHIL # BLD AUTO: 0.22 10*3/MM3 (ref 0–0.4)
EOSINOPHIL NFR BLD AUTO: 3.2 % (ref 0.3–6.2)
ERYTHROCYTE [DISTWIDTH] IN BLOOD BY AUTOMATED COUNT: 13.4 % (ref 12.3–15.4)
GLOBULIN SER CALC-MCNC: 2.8 GM/DL
GLUCOSE SERPL-MCNC: 85 MG/DL (ref 74–98)
HCT VFR BLD AUTO: 48.7 % (ref 37.5–51)
HDLC SERPL-MCNC: 82 MG/DL (ref 40–60)
HGB BLD-MCNC: 17.2 G/DL (ref 13–17.7)
IMM GRANULOCYTES # BLD AUTO: 0.03 10*3/MM3 (ref 0–0.05)
IMM GRANULOCYTES NFR BLD AUTO: 0.4 % (ref 0–0.5)
LDLC SERPL CALC-MCNC: 42 MG/DL (ref 0–99)
LYMPHOCYTES # BLD AUTO: 1.58 10*3/MM3 (ref 0.7–3.1)
LYMPHOCYTES NFR BLD AUTO: 23.1 % (ref 19.6–45.3)
MCH RBC QN AUTO: 34.6 PG (ref 26.6–33)
MCHC RBC AUTO-ENTMCNC: 35.3 G/DL (ref 31.5–35.7)
MCV RBC AUTO: 98 FL (ref 79–97)
MONOCYTES # BLD AUTO: 0.61 10*3/MM3 (ref 0.1–0.9)
MONOCYTES NFR BLD AUTO: 8.9 % (ref 5–12)
NEUTROPHILS # BLD AUTO: 4.35 10*3/MM3 (ref 1.7–7)
NEUTROPHILS NFR BLD AUTO: 63.7 % (ref 42.7–76)
NRBC BLD AUTO-RTO: 0 /100 WBC (ref 0–0.2)
PLATELET # BLD AUTO: 273 10*3/MM3 (ref 140–450)
POTASSIUM SERPL-SCNC: 5 MMOL/L (ref 3.5–5.1)
PROT SERPL-MCNC: 6.7 G/DL (ref 6.3–8.2)
PSA SERPL-MCNC: 1.62 NG/ML (ref 0.06–4)
RBC # BLD AUTO: 4.97 10*6/MM3 (ref 4.14–5.8)
SODIUM SERPL-SCNC: 136 MMOL/L (ref 137–145)
TRIGL SERPL-MCNC: 40 MG/DL
VLDLC SERPL CALC-MCNC: 8 MG/DL
WBC # BLD AUTO: 6.84 10*3/MM3 (ref 3.4–10.8)

## 2019-08-01 RX ORDER — CLOPIDOGREL BISULFATE 75 MG/1
75 TABLET ORAL DAILY
Qty: 30 TABLET | Refills: 4 | Status: SHIPPED | OUTPATIENT
Start: 2019-08-01 | End: 2020-01-03 | Stop reason: SDUPTHER

## 2019-08-01 RX ORDER — HYDROCHLOROTHIAZIDE 25 MG/1
25 TABLET ORAL DAILY
Qty: 30 TABLET | Refills: 4 | Status: SHIPPED | OUTPATIENT
Start: 2019-08-01 | End: 2020-01-03 | Stop reason: SDUPTHER

## 2019-08-01 RX ORDER — CARVEDILOL 25 MG/1
25 TABLET ORAL
Qty: 60 TABLET | Refills: 4 | Status: SHIPPED | OUTPATIENT
Start: 2019-08-01 | End: 2020-01-03 | Stop reason: SDUPTHER

## 2019-08-29 RX ORDER — TRAMADOL HYDROCHLORIDE 50 MG/1
50 TABLET ORAL 3 TIMES DAILY PRN
Qty: 90 TABLET | Refills: 2 | Status: SHIPPED | OUTPATIENT
Start: 2019-08-29 | End: 2019-12-02

## 2019-09-30 RX ORDER — FAMOTIDINE 20 MG/1
20 TABLET, FILM COATED ORAL DAILY
Qty: 30 TABLET | Refills: 11 | Status: SHIPPED | OUTPATIENT
Start: 2019-09-30 | End: 2020-07-23 | Stop reason: SDUPTHER

## 2019-09-30 RX ORDER — SIMVASTATIN 80 MG
80 TABLET ORAL
Qty: 30 TABLET | Refills: 5 | Status: SHIPPED | OUTPATIENT
Start: 2019-09-30 | End: 2020-03-30

## 2019-10-01 RX ORDER — BUDESONIDE AND FORMOTEROL FUMARATE DIHYDRATE 80; 4.5 UG/1; UG/1
2 AEROSOL RESPIRATORY (INHALATION)
Qty: 10.2 G | Refills: 3 | Status: SHIPPED | OUTPATIENT
Start: 2019-10-01 | End: 2020-04-27 | Stop reason: SDUPTHER

## 2019-10-11 ENCOUNTER — OFFICE VISIT (OUTPATIENT)
Dept: INTERNAL MEDICINE | Facility: CLINIC | Age: 64
End: 2019-10-11

## 2019-10-11 VITALS
DIASTOLIC BLOOD PRESSURE: 82 MMHG | OXYGEN SATURATION: 97 % | HEIGHT: 68 IN | TEMPERATURE: 98.7 F | BODY MASS INDEX: 24.55 KG/M2 | SYSTOLIC BLOOD PRESSURE: 128 MMHG | WEIGHT: 162 LBS | HEART RATE: 65 BPM

## 2019-10-11 DIAGNOSIS — E78.2 MIXED HYPERLIPIDEMIA: ICD-10-CM

## 2019-10-11 DIAGNOSIS — M19.90 ARTHRITIS: ICD-10-CM

## 2019-10-11 DIAGNOSIS — I10 ESSENTIAL HYPERTENSION: Primary | ICD-10-CM

## 2019-10-11 DIAGNOSIS — Z23 NEED FOR INFLUENZA VACCINATION: ICD-10-CM

## 2019-10-11 DIAGNOSIS — Z72.0 TOBACCO ABUSE: ICD-10-CM

## 2019-10-11 DIAGNOSIS — M50.90 DISC DISORDER OF CERVICAL REGION: ICD-10-CM

## 2019-10-11 DIAGNOSIS — F32.1 MODERATE SINGLE CURRENT EPISODE OF MAJOR DEPRESSIVE DISORDER (HCC): ICD-10-CM

## 2019-10-11 PROCEDURE — 90653 IIV ADJUVANT VACCINE IM: CPT | Performed by: INTERNAL MEDICINE

## 2019-10-11 PROCEDURE — G0008 ADMIN INFLUENZA VIRUS VAC: HCPCS | Performed by: INTERNAL MEDICINE

## 2019-10-11 PROCEDURE — 99214 OFFICE O/P EST MOD 30 MIN: CPT | Performed by: INTERNAL MEDICINE

## 2019-10-11 NOTE — PROGRESS NOTES
Chief Complaint   Patient presents with   • Follow-up     6 months for HLD and HTN.     Leonel Miller is a 64 y.o. male.     Here today for follow up of HTN, HLD, GERD, depression, CAD, allergies and cervical disc disease.   HTN/HLD/CAD- BP is well controlled today, he does not check it at home.  No CP, SOA, palpitations or edema.  He avoids fast food, does not eat much bread.   Depression- this is well controlled on zoloft.  He sleeps well, awakens twice to urinate  GERD- he takes pepcid daily and this prevents any GERD sxs.   Allergies- his allergies have not been bothersome at this time, no current medications  Neck/back pain- no current neck pain, he takes tramadol for it 1-2 times a day  HCM- had pneumovax in 2016.  He will get flu shot today.  He declines colonoscopy.  He did get the Hep A vaccine.  He takes vit d 2000 u a day.  He will check on shingles vaccine at local pharmacy  Tobacco abuse- he continues to smoke a PPD.  He is using symbicort 2 puffs 1-2 times a day.  He is not working on quitting at this time.  He declines low dose CT screening.          The following portions of the patient's history were reviewed and updated as appropriate: allergies, current medications, past family history, past medical history, past social history, past surgical history and problem list.    Review of Systems   Constitutional: Negative for activity change, appetite change and unexpected weight change.   Eyes: Negative for visual disturbance.   Respiratory: Negative for shortness of breath.    Cardiovascular: Negative for chest pain, palpitations and leg swelling.   Gastrointestinal: Negative for abdominal pain.        Rare GERD symptoms   Genitourinary:        He urinates twice at night   Musculoskeletal: Positive for arthralgias and back pain.   Neurological: Negative for headaches.   Psychiatric/Behavioral: Negative for dysphoric mood and sleep disturbance. The patient is not nervous/anxious.   "      Objective   /82   Pulse 65   Temp 98.7 °F (37.1 °C)   Ht 172.7 cm (68\")   Wt 73.5 kg (162 lb)   SpO2 97%   BMI 24.63 kg/m²   Body mass index is 24.63 kg/m².  Physical Exam   Constitutional: He is oriented to person, place, and time. He appears well-developed and well-nourished. No distress.   Very pleasant gentleman who appears older than stated age, no distress today   HENT:   Head: Normocephalic and atraumatic.   Right Ear: External ear normal.   Left Ear: External ear normal.   Mouth/Throat: Oropharynx is clear and moist.   Tympanic membranes normal   Eyes: Conjunctivae and EOM are normal. Pupils are equal, round, and reactive to light.   Neck: Normal range of motion. Neck supple. No thyromegaly present.   Cardiovascular: Normal rate, regular rhythm, normal heart sounds and intact distal pulses.   No murmur heard.  No carotid bruits   Pulmonary/Chest: Effort normal and breath sounds normal. No respiratory distress. He has no wheezes.   Abdominal: Soft. Bowel sounds are normal. He exhibits no distension. There is no tenderness.   Musculoskeletal: Normal range of motion. He exhibits no edema.   Lymphadenopathy:     He has no cervical adenopathy.   Neurological: He is alert and oriented to person, place, and time. No cranial nerve deficit. Coordination normal.   Psychiatric: He has a normal mood and affect. His behavior is normal. Judgment and thought content normal.   Nursing note and vitals reviewed.      Assessment/Plan   Lebron Miller is here today and the following problems have been addressed:      Lebron was seen today for follow-up.    Diagnoses and all orders for this visit:    Essential hypertension    Mixed hyperlipidemia    Moderate single current episode of major depressive disorder (CMS/HCC)    Need for influenza vaccination  -     Cancel: Flucelvax Quad=>4Years ([id number])  -     Fluad Tri 65yr ([id number])    Tobacco abuse  -     Fluad Tri 65yr ([id number])    Arthritis    Disc " disorder of cervical region        Follow heart healthy/low salt diet  Avoid processed foods  Monitor blood pressure as discussed  Exercise as tolerated up to 150 minutes per week  Take all medications as prescribed  Continue Zoloft for depression, currently stable  He continues on tramadol for underlying arthritis and neck pain, he uses this sparingly only when needed, no signs of abuse  He continues to smoke 1 pack of cigarettes daily, he is not interested in any help with quitting at this time.  He declines offer for CT scanning of chest  Flu shot given today    Return in about 6 months (around 4/11/2020) for Medicare Wellness.      Marilyn K. Vermeesch, MD      Please note that portions of this note were completed with a voice recognition program.  Efforts were made to edit dictation, but occasionally words are mistranscribed.

## 2019-10-29 RX ORDER — CLONIDINE 0.3 MG/24H
PATCH, EXTENDED RELEASE TRANSDERMAL
Qty: 4 EACH | Refills: 5 | Status: SHIPPED | OUTPATIENT
Start: 2019-10-29 | End: 2020-04-27 | Stop reason: SDUPTHER

## 2019-11-05 RX ORDER — LISINOPRIL 40 MG/1
TABLET ORAL
Qty: 30 TABLET | Refills: 6 | Status: SHIPPED | OUTPATIENT
Start: 2019-11-05 | End: 2020-06-04

## 2019-12-02 RX ORDER — TRAMADOL HYDROCHLORIDE 50 MG/1
50 TABLET ORAL 3 TIMES DAILY PRN
Qty: 90 TABLET | Refills: 2 | Status: SHIPPED | OUTPATIENT
Start: 2019-12-02 | End: 2020-02-04

## 2020-01-03 RX ORDER — CLOPIDOGREL BISULFATE 75 MG/1
75 TABLET ORAL DAILY
Qty: 30 TABLET | Refills: 4 | Status: SHIPPED | OUTPATIENT
Start: 2020-01-03 | End: 2020-05-05

## 2020-01-03 RX ORDER — HYDROCHLOROTHIAZIDE 25 MG/1
25 TABLET ORAL DAILY
Qty: 30 TABLET | Refills: 4 | Status: SHIPPED | OUTPATIENT
Start: 2020-01-03 | End: 2020-05-05

## 2020-01-03 RX ORDER — CARVEDILOL 25 MG/1
25 TABLET ORAL 2 TIMES DAILY WITH MEALS
Qty: 60 TABLET | Refills: 4 | Status: SHIPPED | OUTPATIENT
Start: 2020-01-03 | End: 2020-05-05

## 2020-02-04 DIAGNOSIS — M50.90 DISC DISORDER OF CERVICAL REGION: Primary | ICD-10-CM

## 2020-02-04 RX ORDER — TRAMADOL HYDROCHLORIDE 50 MG/1
50 TABLET ORAL 3 TIMES DAILY PRN
Qty: 90 TABLET | Refills: 2 | Status: SHIPPED | OUTPATIENT
Start: 2020-02-04 | End: 2020-05-19

## 2020-03-30 ENCOUNTER — TELEPHONE (OUTPATIENT)
Dept: INTERNAL MEDICINE | Facility: CLINIC | Age: 65
End: 2020-03-30

## 2020-03-30 RX ORDER — RANITIDINE 150 MG/1
150 TABLET ORAL 2 TIMES DAILY
Qty: 60 TABLET | Refills: 11 | Status: SHIPPED | OUTPATIENT
Start: 2020-03-30 | End: 2020-04-21 | Stop reason: ALTCHOICE

## 2020-03-30 RX ORDER — SIMVASTATIN 80 MG
80 TABLET ORAL
Qty: 30 TABLET | Refills: 5 | Status: SHIPPED | OUTPATIENT
Start: 2020-03-30 | End: 2020-09-29 | Stop reason: SDUPTHER

## 2020-03-30 NOTE — TELEPHONE ENCOUNTER
Pharmacy called because famotidine (PEPCID) 20 MG tablet has been on back order for a while and now the OTC is on back order too. Pharmacy is calling to request for an order of Ranitidine 150mg    Pharmacy: [facility]  PH: [phone]  FAX: [phone]

## 2020-04-27 RX ORDER — CLONIDINE 0.3 MG/24H
PATCH, EXTENDED RELEASE TRANSDERMAL
Qty: 4 EACH | Refills: 5 | Status: SHIPPED | OUTPATIENT
Start: 2020-04-27 | End: 2020-10-27 | Stop reason: SDUPTHER

## 2020-04-27 RX ORDER — BUDESONIDE AND FORMOTEROL FUMARATE DIHYDRATE 80; 4.5 UG/1; UG/1
2 AEROSOL RESPIRATORY (INHALATION)
Qty: 10.2 G | Refills: 3 | Status: SHIPPED | OUTPATIENT
Start: 2020-04-27 | End: 2020-11-30 | Stop reason: SDUPTHER

## 2020-05-05 RX ORDER — HYDROCHLOROTHIAZIDE 25 MG/1
25 TABLET ORAL DAILY
Qty: 30 TABLET | Refills: 4 | Status: SHIPPED | OUTPATIENT
Start: 2020-05-05 | End: 2020-05-19

## 2020-05-05 RX ORDER — CARVEDILOL 25 MG/1
25 TABLET ORAL 2 TIMES DAILY WITH MEALS
Qty: 60 TABLET | Refills: 4 | Status: SHIPPED | OUTPATIENT
Start: 2020-05-05 | End: 2020-05-19

## 2020-05-05 RX ORDER — CLOPIDOGREL BISULFATE 75 MG/1
75 TABLET ORAL DAILY
Qty: 30 TABLET | Refills: 4 | Status: SHIPPED | OUTPATIENT
Start: 2020-05-05 | End: 2020-05-19

## 2020-05-19 DIAGNOSIS — M50.90 DISC DISORDER OF CERVICAL REGION: ICD-10-CM

## 2020-05-19 RX ORDER — CARVEDILOL 25 MG/1
25 TABLET ORAL 2 TIMES DAILY WITH MEALS
Qty: 60 TABLET | Refills: 0 | Status: SHIPPED | OUTPATIENT
Start: 2020-05-19 | End: 2020-06-04

## 2020-05-19 RX ORDER — CLOPIDOGREL BISULFATE 75 MG/1
75 TABLET ORAL DAILY
Qty: 30 TABLET | Refills: 0 | Status: SHIPPED | OUTPATIENT
Start: 2020-05-19 | End: 2020-06-04

## 2020-05-19 RX ORDER — TRAMADOL HYDROCHLORIDE 50 MG/1
50 TABLET ORAL 3 TIMES DAILY PRN
Qty: 90 TABLET | Refills: 2 | Status: SHIPPED | OUTPATIENT
Start: 2020-05-19 | End: 2020-08-28

## 2020-05-19 RX ORDER — HYDROCHLOROTHIAZIDE 25 MG/1
25 TABLET ORAL DAILY
Qty: 30 TABLET | Refills: 0 | Status: SHIPPED | OUTPATIENT
Start: 2020-05-19 | End: 2020-06-04

## 2020-06-04 RX ORDER — HYDROCHLOROTHIAZIDE 25 MG/1
25 TABLET ORAL DAILY
Qty: 30 TABLET | Refills: 5 | Status: SHIPPED | OUTPATIENT
Start: 2020-06-04 | End: 2020-12-28

## 2020-06-04 RX ORDER — LISINOPRIL 40 MG/1
TABLET ORAL
Qty: 30 TABLET | Refills: 6 | Status: SHIPPED | OUTPATIENT
Start: 2020-06-04 | End: 2021-01-25 | Stop reason: SDUPTHER

## 2020-06-04 RX ORDER — CARVEDILOL 25 MG/1
25 TABLET ORAL 2 TIMES DAILY WITH MEALS
Qty: 60 TABLET | Refills: 5 | Status: SHIPPED | OUTPATIENT
Start: 2020-06-04 | End: 2020-12-28

## 2020-06-04 RX ORDER — CLOPIDOGREL BISULFATE 75 MG/1
75 TABLET ORAL DAILY
Qty: 30 TABLET | Refills: 5 | Status: SHIPPED | OUTPATIENT
Start: 2020-06-04 | End: 2020-12-28

## 2020-07-23 ENCOUNTER — OFFICE VISIT (OUTPATIENT)
Dept: INTERNAL MEDICINE | Facility: CLINIC | Age: 65
End: 2020-07-23

## 2020-07-23 VITALS
HEIGHT: 68 IN | DIASTOLIC BLOOD PRESSURE: 90 MMHG | WEIGHT: 159 LBS | SYSTOLIC BLOOD PRESSURE: 144 MMHG | TEMPERATURE: 98.2 F | BODY MASS INDEX: 24.1 KG/M2 | HEART RATE: 84 BPM | OXYGEN SATURATION: 97 %

## 2020-07-23 DIAGNOSIS — I10 ESSENTIAL HYPERTENSION: ICD-10-CM

## 2020-07-23 DIAGNOSIS — E78.2 MIXED HYPERLIPIDEMIA: ICD-10-CM

## 2020-07-23 DIAGNOSIS — Z00.00 ENCOUNTER FOR MEDICARE ANNUAL WELLNESS EXAM: Primary | ICD-10-CM

## 2020-07-23 DIAGNOSIS — F32.1 MODERATE SINGLE CURRENT EPISODE OF MAJOR DEPRESSIVE DISORDER (HCC): ICD-10-CM

## 2020-07-23 DIAGNOSIS — I25.10 CHRONIC CORONARY ARTERY DISEASE: ICD-10-CM

## 2020-07-23 DIAGNOSIS — Z12.5 SCREENING PSA (PROSTATE SPECIFIC ANTIGEN): ICD-10-CM

## 2020-07-23 PROCEDURE — G0439 PPPS, SUBSEQ VISIT: HCPCS | Performed by: INTERNAL MEDICINE

## 2020-07-23 PROCEDURE — 90670 PCV13 VACCINE IM: CPT | Performed by: INTERNAL MEDICINE

## 2020-07-23 PROCEDURE — 96160 PT-FOCUSED HLTH RISK ASSMT: CPT | Performed by: INTERNAL MEDICINE

## 2020-07-23 PROCEDURE — G0009 ADMIN PNEUMOCOCCAL VACCINE: HCPCS | Performed by: INTERNAL MEDICINE

## 2020-07-23 PROCEDURE — 99397 PER PM REEVAL EST PAT 65+ YR: CPT | Performed by: INTERNAL MEDICINE

## 2020-07-23 NOTE — PROGRESS NOTES
The ABCs of the Annual Wellness Visit  Subsequent Medicare Wellness Visit    Chief Complaint   Patient presents with   • Medicare Wellness-subsequent       Subjective   History of Present Illness:  Lebron Miller is a 65 y.o. male who presents for a Subsequent Medicare Wellness Visit.  PMH of HTN, HLD, CAD, vitamin D deficiency, allergies, DJD, psoriasis, depression and tobacco abuse.  He has not been checking his BP at home and it is elevated today.  He states he ran up the steps today and blames this.  He did take all of his meds and ate breakfast.  He denies CP, SOA or edema.  No palpitations.  He takes his vit d daily.  He takes pepcid daily and has no GERD.  He does not feel like he needs zoloft anymore, so he quit taking it a long time ago.  He does not have any depression or anxiety, sometimes he does not sleep good.  No current psoriasis.  He does have some DJD pain with weather changes.  He takes tramadol 2-3 times a day.  He is smoking about a PPD. He is interested in quitting, but does not feel like trying right now.  He is not interested in a screening CT for lung cancer at this time    HEALTH RISK ASSESSMENT    Recent Hospitalizations:  No hospitalization(s) within the last year.    Current Medical Providers:  Patient Care Team:  Vermeesch, Marilyn K, MD as PCP - General    Smoking Status:  Social History     Tobacco Use   Smoking Status Current Every Day Smoker   • Types: Cigarettes       Alcohol Consumption:  Social History     Substance and Sexual Activity   Alcohol Use No       Depression Screen:   PHQ-2/PHQ-9 Depression Screening 7/23/2020   Little interest or pleasure in doing things 0   Feeling down, depressed, or hopeless 0   Total Score 0       Fall Risk Screen:  STEADI Fall Risk Assessment was completed, and patient is at LOW risk for falls.Assessment completed on:7/23/2020    Health Habits and Functional and Cognitive Screening:  Functional & Cognitive Status 7/23/2020   Do you have difficulty  preparing food and eating? No   Do you have difficulty bathing yourself, getting dressed or grooming yourself? No   Do you have difficulty using the toilet? No   Do you have difficulty moving around from place to place? No   Do you have trouble with steps or getting out of a bed or a chair? No   Current Diet Well Balanced Diet   Dental Exam Not up to date   Eye Exam Up to date   Exercise (times per week) 5 times per week   Current Exercise Activities Include Yard Work   Do you need help using the phone?  No   Are you deaf or do you have serious difficulty hearing?  No   Do you need help with transportation? No   Do you need help shopping? No   Do you need help preparing meals?  No   Do you need help with housework?  No   Do you need help with laundry? No   Do you need help taking your medications? No   Do you need help managing money? No   Do you ever drive or ride in a car without wearing a seat belt? No   Have you felt unusual stress, anger or loneliness in the last month? No   Who do you live with? Alone   If you need help, do you have trouble finding someone available to you? No   Do you have difficulty concentrating, remembering or making decisions? No         Does the patient have evidence of cognitive impairment? No    Asprin use counseling:Taking ASA appropriately as indicated    Age-appropriate Screening Schedule:  Refer to the list below for future screening recommendations based on patient's age, sex and/or medical conditions. Orders for these recommended tests are listed in the plan section. The patient has been provided with a written plan.    Health Maintenance   Topic Date Due   • COLONOSCOPY  05/17/2016   • ZOSTER VACCINE (2 of 2) 03/15/2017   • LIPID PANEL  07/29/2020   • INFLUENZA VACCINE  08/01/2020   • TDAP/TD VACCINES  Discontinued          The following portions of the patient's history were reviewed and updated as appropriate: allergies, current medications, past family history, past medical  history, past social history, past surgical history and problem list.    Outpatient Medications Prior to Visit   Medication Sig Dispense Refill   • aspirin 81 MG tablet Take  by mouth daily.     • budesonide-formoterol (SYMBICORT) 80-4.5 MCG/ACT inhaler Inhale 2 puffs by mouth 2 (Two) Times a Day. 10.2 g 3   • carvedilol (COREG) 25 MG tablet Take 1 tablet by mouth 2 (Two) Times a Day With Meals. 60 tablet 5   • Cholecalciferol (VITAMIN D) 2000 UNITS tablet Take  by mouth.     • cloNIDine (CATAPRES-TTS) 0.3 MG/24HR patch Place 1 patch on the skin 1 (One) Time Per Week. 4 each 5   • clopidogrel (PLAVIX) 75 MG tablet Take 1 tablet by mouth Daily. 30 tablet 5   • famotidine (PEPCID) 10 MG tablet Take 2 tablets by mouth Daily. 60 tablet 4   • hydroCHLOROthiazide (HYDRODIURIL) 25 MG tablet Take 1 tablet by mouth Daily. 30 tablet 5   • lisinopril (PRINIVIL,ZESTRIL) 40 MG tablet Take 1 tablet by mouth once daily 30 tablet 6   • simvastatin (ZOCOR) 80 MG tablet Take 1 tablet by mouth every night at bedtime. 30 tablet 5   • traMADol (ULTRAM) 50 MG tablet Take 1 tablet by mouth 3 (Three) Times a Day As Needed for Moderate Pain . 90 tablet 2   • famotidine (PEPCID) 20 MG tablet Take 1 tablet by mouth Daily. 30 tablet 11   • sertraline (ZOLOFT) 50 MG tablet Take 1 tablet by mouth Daily. 30 tablet 5     No facility-administered medications prior to visit.        Patient Active Problem List   Diagnosis   • Atopic rhinitis   • Chronic coronary artery disease   • Arthritis   • Disc disorder of cervical region   • Hyperlipidemia   • Hypertension   • Psoriasis   • Tobacco abuse   • Vitamin D deficiency   • Moderate single current episode of major depressive disorder (CMS/HCC)   • Screening PSA (prostate specific antigen)   • Encounter for Medicare annual wellness exam       Advanced Care Planning:  ACP discussion was held with the patient during this visit. Patient has an advance directive (not in EMR), copy requested.    Review of  "Systems   Constitutional: Negative.    HENT: Positive for postnasal drip.    Eyes: Negative.    Respiratory: Positive for shortness of breath.    Cardiovascular: Negative.    Gastrointestinal: Negative.    Endocrine: Negative.    Genitourinary: Negative.    Musculoskeletal: Positive for arthralgias and neck pain.   Skin: Negative.    Allergic/Immunologic: Positive for environmental allergies.   Neurological: Negative.    Hematological: Bruises/bleeds easily.   Psychiatric/Behavioral: Positive for sleep disturbance.       Compared to one year ago, the patient feels his physical health is the same.  Compared to one year ago, the patient feels his mental health is the same.    Reviewed chart for potential of high risk medication in the elderly: yes  Reviewed chart for potential of harmful drug interactions in the elderly:yes    Objective         Vitals:    07/23/20 0949 07/23/20 0958   BP: 162/90 144/90   Pulse: 84    Temp: 98.2 °F (36.8 °C)    SpO2: 97%    Weight: 72.1 kg (159 lb)    Height: 172.7 cm (68\")    PainSc:   4        Body mass index is 24.18 kg/m².  Discussed the patient's BMI with him. The BMI is in the acceptable range.    Physical Exam   Constitutional: He is oriented to person, place, and time. He appears well-developed and well-nourished. No distress.   Very kind and pleasant man, he is wearing a mask and in no obvious distress   HENT:   Head: Normocephalic and atraumatic.   Right Ear: External ear normal.   Left Ear: External ear normal.   TMs normal, canals free of wax   Eyes: Pupils are equal, round, and reactive to light. Conjunctivae and EOM are normal.   Neck: Normal range of motion. Neck supple. No thyromegaly present.   Cardiovascular: Normal rate, regular rhythm, normal heart sounds and intact distal pulses.   No murmur heard.  No carotid bruits   Pulmonary/Chest: Effort normal. No respiratory distress. He has no wheezes.   Diminished breath sounds throughout all lung fields   Abdominal: Soft. " Bowel sounds are normal. He exhibits no distension. There is no tenderness.   Musculoskeletal: Normal range of motion. He exhibits no edema.   Lymphadenopathy:     He has no cervical adenopathy.   Neurological: He is alert and oriented to person, place, and time. No cranial nerve deficit. Coordination normal.   Skin:   Multiple areas of ecchymosis noted over upper extremities   Psychiatric: He has a normal mood and affect. His behavior is normal. Judgment and thought content normal.   Nursing note and vitals reviewed.            Assessment/Plan   Medicare Risks and Personalized Health Plan  CMS Preventative Services Quick Reference  Advance Directive Discussion  Cardiovascular risk  Chronic Pain   Diabetic Lab Screening   Immunizations Discussed/Encouraged (specific immunizations; Prevnar and Shingrix )  Inactivity/Sedentary  Lung Cancer Risk  Prostate Cancer Screening     The above risks/problems have been discussed with the patient.  Pertinent information has been shared with the patient in the After Visit Summary.  Follow up plans and orders are seen below in the Assessment/Plan Section.    Diagnoses and all orders for this visit:    1. Encounter for Medicare annual wellness exam (Primary)  -     CBC & Differential  -     Comprehensive Metabolic Panel  -     Lipid Panel With / Chol / HDL Ratio  -     PSA Screen    2. Essential hypertension  -     CBC & Differential  -     Comprehensive Metabolic Panel  -     Lipid Panel With / Chol / HDL Ratio    3. Mixed hyperlipidemia  -     Comprehensive Metabolic Panel  -     Lipid Panel With / Chol / HDL Ratio    4. Moderate single current episode of major depressive disorder (CMS/[facility])    5. Screening PSA (prostate specific antigen)  -     PSA Screen    6. Chronic coronary artery disease    Follow heart healthy/low salt diet  Avoid processed foods  Monitor blood pressure as discussed  Exercise as tolerated up to 30 minutes 5 days per week  Take all medications as  prescribed  Labs as noted  Given prevnar vaccine today  He will check with shingles and Tdap vaccine at pharmacy  Request copy of living will for chart  He declines offer for help with tobacco cessation  He declines CT screening for lung cancer  We had a lengthy discussion regarding his history of CAD.  He had a stent placed greater than 10 years ago, he thinks approximately 15 years ago.  I explained to him that he is at the end of his stent life.  I recommend a stress test but he declines this also.  He has not seen a cardiologist in many years and he declines this also    Follow Up:  Return in about 6 months (around 1/23/2021) for Next scheduled follow up.     An After Visit Summary and PPPS were given to the patient.

## 2020-08-28 DIAGNOSIS — M50.90 DISC DISORDER OF CERVICAL REGION: ICD-10-CM

## 2020-08-28 RX ORDER — TRAMADOL HYDROCHLORIDE 50 MG/1
50 TABLET ORAL 3 TIMES DAILY PRN
Qty: 90 TABLET | Refills: 2 | Status: SHIPPED | OUTPATIENT
Start: 2020-08-28 | End: 2020-11-30 | Stop reason: SDUPTHER

## 2020-09-29 RX ORDER — SIMVASTATIN 80 MG
80 TABLET ORAL
Qty: 30 TABLET | Refills: 12 | Status: SHIPPED | OUTPATIENT
Start: 2020-09-29 | End: 2021-10-27 | Stop reason: SDUPTHER

## 2020-10-05 LAB
ALBUMIN SERPL-MCNC: 4.3 G/DL (ref 3.5–5.2)
ALBUMIN/GLOB SERPL: 2.2 G/DL
ALP SERPL-CCNC: 77 U/L (ref 39–117)
ALT SERPL-CCNC: 15 U/L (ref 1–41)
AST SERPL-CCNC: 16 U/L (ref 1–40)
BASOPHILS # BLD AUTO: 0.05 10*3/MM3 (ref 0–0.2)
BASOPHILS NFR BLD AUTO: 0.7 % (ref 0–1.5)
BILIRUB SERPL-MCNC: 0.5 MG/DL (ref 0–1.2)
BUN SERPL-MCNC: 16 MG/DL (ref 8–23)
BUN/CREAT SERPL: 19.5 (ref 7–25)
CALCIUM SERPL-MCNC: 8.8 MG/DL (ref 8.6–10.5)
CHLORIDE SERPL-SCNC: 101 MMOL/L (ref 98–107)
CHOLEST SERPL-MCNC: 143 MG/DL (ref 0–200)
CHOLEST/HDLC SERPL: 2.51 {RATIO}
CO2 SERPL-SCNC: 28.7 MMOL/L (ref 22–29)
CREAT SERPL-MCNC: 0.82 MG/DL (ref 0.76–1.27)
EOSINOPHIL # BLD AUTO: 0.3 10*3/MM3 (ref 0–0.4)
EOSINOPHIL NFR BLD AUTO: 4.1 % (ref 0.3–6.2)
ERYTHROCYTE [DISTWIDTH] IN BLOOD BY AUTOMATED COUNT: 12.9 % (ref 12.3–15.4)
GLOBULIN SER CALC-MCNC: 2 GM/DL
GLUCOSE SERPL-MCNC: 93 MG/DL (ref 65–99)
HCT VFR BLD AUTO: 45.8 % (ref 37.5–51)
HDLC SERPL-MCNC: 57 MG/DL (ref 40–60)
HGB BLD-MCNC: 16.4 G/DL (ref 13–17.7)
IMM GRANULOCYTES # BLD AUTO: 0.02 10*3/MM3 (ref 0–0.05)
IMM GRANULOCYTES NFR BLD AUTO: 0.3 % (ref 0–0.5)
LDLC SERPL CALC-MCNC: 71 MG/DL (ref 0–100)
LYMPHOCYTES # BLD AUTO: 1.97 10*3/MM3 (ref 0.7–3.1)
LYMPHOCYTES NFR BLD AUTO: 26.6 % (ref 19.6–45.3)
MCH RBC QN AUTO: 34.2 PG (ref 26.6–33)
MCHC RBC AUTO-ENTMCNC: 35.8 G/DL (ref 31.5–35.7)
MCV RBC AUTO: 95.4 FL (ref 79–97)
MONOCYTES # BLD AUTO: 0.72 10*3/MM3 (ref 0.1–0.9)
MONOCYTES NFR BLD AUTO: 9.7 % (ref 5–12)
NEUTROPHILS # BLD AUTO: 4.34 10*3/MM3 (ref 1.7–7)
NEUTROPHILS NFR BLD AUTO: 58.6 % (ref 42.7–76)
NRBC BLD AUTO-RTO: 0 /100 WBC (ref 0–0.2)
PLATELET # BLD AUTO: 296 10*3/MM3 (ref 140–450)
POTASSIUM SERPL-SCNC: 4 MMOL/L (ref 3.5–5.2)
PROT SERPL-MCNC: 6.3 G/DL (ref 6–8.5)
PSA SERPL-MCNC: 1.94 NG/ML (ref 0–4)
RBC # BLD AUTO: 4.8 10*6/MM3 (ref 4.14–5.8)
SODIUM SERPL-SCNC: 139 MMOL/L (ref 136–145)
TRIGL SERPL-MCNC: 76 MG/DL (ref 0–150)
VLDLC SERPL CALC-MCNC: 15.2 MG/DL
WBC # BLD AUTO: 7.4 10*3/MM3 (ref 3.4–10.8)

## 2020-10-27 RX ORDER — CLONIDINE 0.3 MG/24H
PATCH, EXTENDED RELEASE TRANSDERMAL
Qty: 4 EACH | Refills: 12 | Status: SHIPPED | OUTPATIENT
Start: 2020-10-27 | End: 2021-11-22 | Stop reason: SDUPTHER

## 2020-11-30 DIAGNOSIS — M50.90 DISC DISORDER OF CERVICAL REGION: ICD-10-CM

## 2020-12-01 RX ORDER — BUDESONIDE AND FORMOTEROL FUMARATE DIHYDRATE 80; 4.5 UG/1; UG/1
2 AEROSOL RESPIRATORY (INHALATION)
Qty: 10.2 G | Refills: 3 | Status: SHIPPED | OUTPATIENT
Start: 2020-12-01 | End: 2021-05-26 | Stop reason: SDUPTHER

## 2020-12-01 RX ORDER — FAMOTIDINE 10 MG
20 TABLET ORAL DAILY
Qty: 180 TABLET | Refills: 3 | Status: SHIPPED | OUTPATIENT
Start: 2020-12-01 | End: 2021-10-27 | Stop reason: SDUPTHER

## 2020-12-01 RX ORDER — TRAMADOL HYDROCHLORIDE 50 MG/1
50 TABLET ORAL 3 TIMES DAILY PRN
Qty: 90 TABLET | Refills: 2 | Status: SHIPPED | OUTPATIENT
Start: 2020-12-01 | End: 2021-02-24 | Stop reason: SDUPTHER

## 2020-12-01 NOTE — TELEPHONE ENCOUNTER
Please ask patient to come in to sign controlled substance contract and give us a urine drug screen.

## 2020-12-28 RX ORDER — HYDROCHLOROTHIAZIDE 25 MG/1
25 TABLET ORAL DAILY
Qty: 90 TABLET | Refills: 3 | Status: SHIPPED | OUTPATIENT
Start: 2020-12-28 | End: 2021-12-23 | Stop reason: SDUPTHER

## 2020-12-28 RX ORDER — CLOPIDOGREL BISULFATE 75 MG/1
75 TABLET ORAL DAILY
Qty: 90 TABLET | Refills: 3 | Status: SHIPPED | OUTPATIENT
Start: 2020-12-28 | End: 2021-12-23 | Stop reason: SDUPTHER

## 2020-12-28 RX ORDER — CARVEDILOL 25 MG/1
25 TABLET ORAL 2 TIMES DAILY WITH MEALS
Qty: 180 TABLET | Refills: 3 | Status: SHIPPED | OUTPATIENT
Start: 2020-12-28 | End: 2021-12-23 | Stop reason: SDUPTHER

## 2021-01-25 ENCOUNTER — OFFICE VISIT (OUTPATIENT)
Dept: INTERNAL MEDICINE | Facility: CLINIC | Age: 66
End: 2021-01-25

## 2021-01-25 VITALS
WEIGHT: 160 LBS | HEART RATE: 64 BPM | OXYGEN SATURATION: 98 % | HEIGHT: 68 IN | SYSTOLIC BLOOD PRESSURE: 148 MMHG | TEMPERATURE: 97 F | BODY MASS INDEX: 24.25 KG/M2 | DIASTOLIC BLOOD PRESSURE: 86 MMHG

## 2021-01-25 DIAGNOSIS — M50.90 DISC DISORDER OF CERVICAL REGION: ICD-10-CM

## 2021-01-25 DIAGNOSIS — I10 ESSENTIAL HYPERTENSION: Primary | ICD-10-CM

## 2021-01-25 DIAGNOSIS — I25.10 CHRONIC CORONARY ARTERY DISEASE: ICD-10-CM

## 2021-01-25 DIAGNOSIS — E78.2 MIXED HYPERLIPIDEMIA: ICD-10-CM

## 2021-01-25 PROCEDURE — 99214 OFFICE O/P EST MOD 30 MIN: CPT | Performed by: INTERNAL MEDICINE

## 2021-01-25 RX ORDER — LISINOPRIL 40 MG/1
TABLET ORAL
Qty: 30 TABLET | Refills: 6 | Status: SHIPPED | OUTPATIENT
Start: 2021-01-25 | End: 2021-08-27 | Stop reason: SDUPTHER

## 2021-01-25 NOTE — PROGRESS NOTES
Chief Complaint   Patient presents with   • Follow-up     for HLD, HTN, and depressive disorder.      Subjective   Lebron Miller is a 66 y.o. male.     Here today for follow up of HTN, HLD, GERD, depression, CAD, allergies and cervical disc disease.   HTN/HLD/CAD- BP is elevated today, he does not check it at home.  No CP, SOA, palpitations or edema.  He avoids fast food, does not eat much bread.  He is compliant with all meds.  He takes cholesterol med every night.  His cardiac stent was placed approximately 15 to 20 years ago.  He has not seen a cardiologist in as long, and he is not currently interested in seeing one.  Depression- he is off all meds at this time. He denies any depression or anxiety.  Sleeps well at night.   GERD- he takes pepcid daily and this prevents any GERD sxs.   Allergies- his allergies have been bothersome.  He does not take any thing for allergies, says nothing helps.    Neck/back pain- no current neck pain unless he flexes it for a period of time, he takes tramadol for it 1-2 times a day for neck and DJD pain.  The medication helps most of the time.   He had right LBP down right leg last week, much better now  HCM- had pneumococcal and flu vaccines.  He declines colonoscopy.  He did get the Hep A vaccines but not shingles vaccines.  He takes vit d 2000 u a day.  Tobacco abuse- he continues to smoke a PPD.  He is using symbicort 2 puffs 1-2 times a day.  He is not working on quitting at this time.  He declines low dose CT screening.        The following portions of the patient's history were reviewed and updated as appropriate: allergies, current medications, past family history, past medical history, past social history, past surgical history and problem list.    Review of Systems   Constitutional: Negative for activity change, appetite change and unexpected weight change.   Eyes: Negative for visual disturbance.   Respiratory: Negative for shortness of breath.    Cardiovascular:  "Negative for chest pain, palpitations and leg swelling.   Gastrointestinal: Negative for abdominal pain.   Musculoskeletal: Positive for arthralgias, back pain and neck pain.   Allergic/Immunologic: Positive for environmental allergies.   Neurological: Negative for headaches.   Psychiatric/Behavioral: Negative for dysphoric mood and sleep disturbance. The patient is not nervous/anxious.        Objective   /86   Pulse 64   Temp 97 °F (36.1 °C)   Ht 172.7 cm (68\")   Wt 72.6 kg (160 lb)   SpO2 98%   BMI 24.33 kg/m²   Body mass index is 24.33 kg/m².  Physical Exam  Vitals signs and nursing note reviewed.   Constitutional:       General: He is not in acute distress.     Appearance: Normal appearance. He is well-developed. He is not ill-appearing.      Comments: Very kind man, appears older than his stated age, he is in no distress today   HENT:      Head: Normocephalic and atraumatic.      Right Ear: External ear normal.      Left Ear: External ear normal.   Eyes:      Extraocular Movements: Extraocular movements intact.      Conjunctiva/sclera: Conjunctivae normal.      Pupils: Pupils are equal, round, and reactive to light.   Neck:      Musculoskeletal: Normal range of motion and neck supple.      Thyroid: No thyromegaly.   Cardiovascular:      Rate and Rhythm: Normal rate and regular rhythm.      Heart sounds: Normal heart sounds. No murmur.      Comments: No carotid bruits  Pulmonary:      Effort: Pulmonary effort is normal. No respiratory distress.      Breath sounds: Normal breath sounds. No wheezing.   Abdominal:      General: Bowel sounds are normal. There is no distension.      Palpations: Abdomen is soft.      Tenderness: There is no abdominal tenderness.   Musculoskeletal: Normal range of motion.      Right lower leg: No edema.      Left lower leg: No edema.   Lymphadenopathy:      Cervical: No cervical adenopathy.   Neurological:      General: No focal deficit present.      Mental Status: He is " alert and oriented to person, place, and time. Mental status is at baseline.      Cranial Nerves: No cranial nerve deficit.      Coordination: Coordination normal.   Psychiatric:         Mood and Affect: Mood normal.         Behavior: Behavior normal.         Thought Content: Thought content normal.         Judgment: Judgment normal.         Assessment/Plan   Lebron Miller is here today and the following problems have been addressed:      Diagnoses and all orders for this visit:    1. Essential hypertension (Primary)    2. Mixed hyperlipidemia    3. Chronic coronary artery disease    4. Disc disorder of cervical region    Follow heart healthy/low salt diet  Avoid processed foods  Patient feels that blood pressure is elevated today due to being in doctor's office and recently walking up steps to get into clinic  Monitor blood pressure as discussed-encouraged patient to check blood pressure at home however he says monitor is not accurate and he does not want to purchase another.  Encouraged him to swing by clinic and have blood pressure checked on occasion  Exercise as tolerated up to 150 minutes per week  Take all medications as prescribed  Patient declines offer to see a cardiologist for follow-up  Continue Pepcid for underlying GERD, currently well controlled  Continue Zocor, lipid panel in normal range 6 months ago  Continue Symbicort for underlying allergies and COPD  Patient is not interested in medication for tobacco cessation, states he is not ready to quit smoking at this time  Continue tramadol for chronic neck and arthritis pain, patient does not abuse medication, Codey is always appropriate    Return in about 6 months (around 7/25/2021) for Medicare Wellness.      Marilyn K. Vermeesch, MD      Please note that portions of this note were completed with a voice recognition program.  Efforts were made to edit dictation, but occasionally words are mistranscribed.

## 2021-02-24 DIAGNOSIS — M50.90 DISC DISORDER OF CERVICAL REGION: ICD-10-CM

## 2021-02-24 RX ORDER — TRAMADOL HYDROCHLORIDE 50 MG/1
50 TABLET ORAL 3 TIMES DAILY PRN
Qty: 90 TABLET | Refills: 2 | Status: SHIPPED | OUTPATIENT
Start: 2021-02-24 | End: 2021-05-26 | Stop reason: SDUPTHER

## 2021-02-24 NOTE — TELEPHONE ENCOUNTER
Last filled: 12/1/20  Days worth: 30  Refills: 2    Last appointment: 1/25/21  Next appointment: 7/26/21    Labs: MARLENE

## 2021-05-26 DIAGNOSIS — M50.90 DISC DISORDER OF CERVICAL REGION: ICD-10-CM

## 2021-05-26 RX ORDER — TRAMADOL HYDROCHLORIDE 50 MG/1
50 TABLET ORAL 3 TIMES DAILY PRN
Qty: 90 TABLET | Refills: 2 | Status: SHIPPED | OUTPATIENT
Start: 2021-05-26 | End: 2021-08-27 | Stop reason: SDUPTHER

## 2021-05-26 RX ORDER — BUDESONIDE AND FORMOTEROL FUMARATE DIHYDRATE 80; 4.5 UG/1; UG/1
2 AEROSOL RESPIRATORY (INHALATION)
Qty: 10.2 G | Refills: 3 | Status: SHIPPED | OUTPATIENT
Start: 2021-05-26 | End: 2021-07-06

## 2021-05-26 NOTE — TELEPHONE ENCOUNTER
Last filled: 2/24/21  Days worth: 90   Refills: 2    Last appointment: 1/25/21  Next appointment: 7/26/21    Labs: MARLENE

## 2021-07-06 RX ORDER — DILTIAZEM HYDROCHLORIDE 60 MG/1
TABLET, FILM COATED ORAL
Qty: 10.2 G | Refills: 3 | Status: SHIPPED | OUTPATIENT
Start: 2021-07-06 | End: 2022-03-28 | Stop reason: SDUPTHER

## 2021-07-26 ENCOUNTER — OFFICE VISIT (OUTPATIENT)
Dept: INTERNAL MEDICINE | Facility: CLINIC | Age: 66
End: 2021-07-26

## 2021-07-26 VITALS
WEIGHT: 153 LBS | OXYGEN SATURATION: 96 % | TEMPERATURE: 97 F | HEIGHT: 68 IN | HEART RATE: 69 BPM | DIASTOLIC BLOOD PRESSURE: 80 MMHG | BODY MASS INDEX: 23.19 KG/M2 | SYSTOLIC BLOOD PRESSURE: 148 MMHG

## 2021-07-26 DIAGNOSIS — Z00.00 ENCOUNTER FOR MEDICARE ANNUAL WELLNESS EXAM: Primary | ICD-10-CM

## 2021-07-26 DIAGNOSIS — I25.10 CHRONIC CORONARY ARTERY DISEASE: ICD-10-CM

## 2021-07-26 DIAGNOSIS — I10 ESSENTIAL HYPERTENSION: ICD-10-CM

## 2021-07-26 DIAGNOSIS — Z12.5 SCREENING PSA (PROSTATE SPECIFIC ANTIGEN): ICD-10-CM

## 2021-07-26 DIAGNOSIS — Z11.59 ENCOUNTER FOR HEPATITIS C SCREENING TEST FOR LOW RISK PATIENT: ICD-10-CM

## 2021-07-26 DIAGNOSIS — E78.2 MIXED HYPERLIPIDEMIA: ICD-10-CM

## 2021-07-26 DIAGNOSIS — Z72.0 TOBACCO ABUSE: ICD-10-CM

## 2021-07-26 PROCEDURE — 1159F MED LIST DOCD IN RCRD: CPT | Performed by: INTERNAL MEDICINE

## 2021-07-26 PROCEDURE — 1125F AMNT PAIN NOTED PAIN PRSNT: CPT | Performed by: INTERNAL MEDICINE

## 2021-07-26 PROCEDURE — G0439 PPPS, SUBSEQ VISIT: HCPCS | Performed by: INTERNAL MEDICINE

## 2021-07-26 PROCEDURE — 1170F FXNL STATUS ASSESSED: CPT | Performed by: INTERNAL MEDICINE

## 2021-07-26 PROCEDURE — 96160 PT-FOCUSED HLTH RISK ASSMT: CPT | Performed by: INTERNAL MEDICINE

## 2021-07-26 PROCEDURE — 99397 PER PM REEVAL EST PAT 65+ YR: CPT | Performed by: INTERNAL MEDICINE

## 2021-07-26 RX ORDER — ALBUTEROL SULFATE 90 UG/1
2 AEROSOL, METERED RESPIRATORY (INHALATION) EVERY 4 HOURS PRN
Qty: 18 G | Refills: 3 | Status: SHIPPED | OUTPATIENT
Start: 2021-07-26 | End: 2022-03-14 | Stop reason: SDUPTHER

## 2021-07-26 NOTE — PROGRESS NOTES
The ABCs of the Annual Wellness Visit  Subsequent Medicare Wellness Visit    Chief Complaint   Patient presents with   • Medicare Wellness-subsequent     Pt is requesting jury duty letter.       Subjective   History of Present Illness:  Lebron Miller is a 66 y.o. male who presents for a Subsequent Medicare Wellness Visit.  PMH of HTN, HLD, CAD, allergies, vit D def, DJD,  psoriasis, tobacco abuse.  BP is slightly elevated again today-he does not check BP at home. He has been upset lately due to family situation, son/daughter in law/grand daughter all in [employer] for drugs-have all been bothering him from [employer] and calling him for money and to be bailed out of [employer].  Patient is on multiple blood pressure medications to maintain blood pressure.  He denies CP, palpitations or edema.  He continues to smoke 1 pack of cigarettes daily and is not interested in tobacco cessation or CT scan of chest to evaluate for lung cancer.  He remains on symbicort 2 puffs twice a day and he says this is quite helpful for him. He has some SOA and rare wheezing.  He remains on vitamin D 2000 units daily.  He takes tramadol twice or 3 times daily for underlying osteoarthritis pain.  He denies any current psoriatic symptoms.  Pepcid works well for GERD.    HEALTH RISK ASSESSMENT    Recent Hospitalizations:  No hospitalization(s) within the last year.    Current Medical Providers:  Patient Care Team:  Vermeesch, Marilyn K, MD as PCP - General (Internal Medicine & Pediatrics)    Smoking Status:  Social History     Tobacco Use   Smoking Status Current Every Day Smoker   • Types: Cigarettes   Smokeless Tobacco Never Used       Alcohol Consumption:  Social History     Substance and Sexual Activity   Alcohol Use No       Depression Screen:   PHQ-2/PHQ-9 Depression Screening 7/26/2021   Little interest or pleasure in doing things 0   Feeling down, depressed, or hopeless 0   Total Score 0       Fall Risk Screen:  LUZADI Fall Risk Assessment was  completed, and patient is at LOW risk for falls.Assessment completed on:7/26/2021    Health Habits and Functional and Cognitive Screening:  Functional & Cognitive Status 7/26/2021   Do you have difficulty preparing food and eating? No   Do you have difficulty bathing yourself, getting dressed or grooming yourself? No   Do you have difficulty using the toilet? No   Do you have difficulty moving around from place to place? Yes   Do you have trouble with steps or getting out of a bed or a chair? No   Current Diet Well Balanced Diet   Dental Exam Not up to date   Eye Exam Up to date   Exercise (times per week) 0 times per week   Current Exercises Include No Regular Exercise   Current Exercise Activities Include -   Do you need help using the phone?  No   Are you deaf or do you have serious difficulty hearing?  No   Do you need help with transportation? No   Do you need help shopping? No   Do you need help preparing meals?  No   Do you need help with housework?  No   Do you need help with laundry? No   Do you need help taking your medications? No   Do you need help managing money? No   Do you ever drive or ride in a car without wearing a seat belt? No   Have you felt unusual stress, anger or loneliness in the last month? Yes   Who do you live with? Alone   If you need help, do you have trouble finding someone available to you? No   Have you been bothered in the last four weeks by sexual problems? No   Do you have difficulty concentrating, remembering or making decisions? No         Does the patient have evidence of cognitive impairment? No    Asprin use counseling:Taking ASA appropriately as indicated    Age-appropriate Screening Schedule:  Refer to the list below for future screening recommendations based on patient's age, sex and/or medical conditions. Orders for these recommended tests are listed in the plan section. The patient has been provided with a written plan.    Health Maintenance   Topic Date Due   • ZOSTER  VACCINE (2 of 2) 03/15/2017   • INFLUENZA VACCINE  10/01/2021   • LIPID PANEL  10/05/2021   • TDAP/TD VACCINES  Discontinued          The following portions of the patient's history were reviewed and updated as appropriate: allergies, current medications, past family history, past medical history, past social history, past surgical history and problem list.    Outpatient Medications Prior to Visit   Medication Sig Dispense Refill   • aspirin 81 MG tablet Take  by mouth daily.     • carvedilol (COREG) 25 MG tablet Take 1 tablet by mouth 2 (Two) Times a Day With Meals. 180 tablet 3   • Cholecalciferol (VITAMIN D) 2000 UNITS tablet Take  by mouth.     • cloNIDine (CATAPRES-TTS) 0.3 MG/24HR patch Place 1 patch on the skin 1 (One) Time Per Week. 4 each 12   • clopidogrel (PLAVIX) 75 MG tablet Take 1 tablet by mouth Daily. 90 tablet 3   • famotidine (PEPCID) 10 MG tablet Take 2 tablets by mouth Daily. 180 tablet 3   • hydroCHLOROthiazide (HYDRODIURIL) 25 MG tablet Take 1 tablet by mouth Daily. 90 tablet 3   • lisinopril (PRINIVIL,ZESTRIL) 40 MG tablet Take 1 tablet by mouth once daily 30 tablet 6   • simvastatin (ZOCOR) 80 MG tablet Take 1 tablet by mouth every night at bedtime. 30 tablet 12   • budesonide-formoterol (Symbicort) 80-4.5 MCG/ACT inhaler Inhale 2 puffs by mouth 2 (Two) Times a Day. 10.2 g 3   • traMADol (ULTRAM) 50 MG tablet Take 1 tablet by mouth 3 (Three) Times a Day As Needed for Moderate Pain . 90 tablet 2     No facility-administered medications prior to visit.       Patient Active Problem List   Diagnosis   • Atopic rhinitis   • Chronic coronary artery disease   • Arthritis   • Disc disorder of cervical region   • Hyperlipidemia   • Hypertension   • Psoriasis   • Tobacco abuse   • Vitamin D deficiency   • Moderate single current episode of major depressive disorder (CMS/HCC)   • Screening PSA (prostate specific antigen)   • Encounter for Medicare annual wellness exam   • Encounter for hepatitis C  "screening test for low risk patient       Advanced Care Planning:  ACP discussion was held with the patient during this visit. Patient has an advance directive (not in EMR), copy requested.    Review of Systems   Constitutional: Positive for fatigue.   HENT: Negative.    Eyes: Negative.    Respiratory: Positive for shortness of breath and wheezing.    Cardiovascular: Negative.    Endocrine: Negative.    Genitourinary: Negative.    Musculoskeletal: Positive for arthralgias.   Allergic/Immunologic: Negative.    Neurological: Negative.    Hematological: Negative.    Psychiatric/Behavioral: Positive for agitation. The patient is nervous/anxious.        Compared to one year ago, the patient feels his physical health is the same.  Compared to one year ago, the patient feels his mental health is the same.    Reviewed chart for potential of high risk medication in the elderly: yes  Reviewed chart for potential of harmful drug interactions in the elderly:yes    Objective         Vitals:    07/26/21 1515   BP: 148/80   Pulse: 69   Temp: 97 °F (36.1 °C)   SpO2: 96%   Weight: 69.4 kg (153 lb)   Height: 172.7 cm (68\")   PainSc:   3       Body mass index is 23.26 kg/m².  Discussed the patient's BMI with him. The BMI is in the acceptable range.    Physical Exam  Vitals and nursing note reviewed.   Constitutional:       General: He is not in acute distress.     Appearance: Normal appearance. He is well-developed. He is not ill-appearing.      Comments: Kind and pleasant man, appears stated age and in NAD today   HENT:      Head: Normocephalic and atraumatic.      Right Ear: Tympanic membrane, ear canal and external ear normal.      Left Ear: Tympanic membrane, ear canal and external ear normal.   Eyes:      General:         Right eye: No discharge.         Left eye: No discharge.      Extraocular Movements: Extraocular movements intact.      Conjunctiva/sclera: Conjunctivae normal.      Pupils: Pupils are equal, round, and reactive " to light.   Neck:      Thyroid: No thyromegaly.      Vascular: No carotid bruit.      Comments: No thyromegaly or mass  Cardiovascular:      Rate and Rhythm: Normal rate and regular rhythm.      Heart sounds: Normal heart sounds. No murmur heard.        Comments: +1 DP and PT pulse right foot, significantly dampened DP and PT pulse in left foot very difficult to palpate but foot is warm, hair loss noted in distal legs bilaterally  Pulmonary:      Effort: Pulmonary effort is normal. No respiratory distress.      Breath sounds: No wheezing.      Comments: Decreased breath sounds throughout all lung fields  Abdominal:      General: Bowel sounds are normal. There is no distension.      Palpations: Abdomen is soft.      Tenderness: There is no abdominal tenderness.   Musculoskeletal:         General: Normal range of motion.      Cervical back: Normal range of motion and neck supple.      Right lower leg: No edema.      Left lower leg: No edema.   Lymphadenopathy:      Cervical: No cervical adenopathy.   Skin:     General: Skin is warm.      Findings: No rash.   Neurological:      General: No focal deficit present.      Mental Status: He is alert and oriented to person, place, and time. Mental status is at baseline.      Cranial Nerves: No cranial nerve deficit.      Motor: No weakness.      Coordination: Coordination normal.      Gait: Gait normal.   Psychiatric:         Mood and Affect: Mood normal.         Behavior: Behavior normal.         Thought Content: Thought content normal.         Judgment: Judgment normal.               Assessment/Plan   Medicare Risks and Personalized Health Plan  CMS Preventative Services Quick Reference  Advance Directive Discussion  Cardiovascular risk  Diabetic Lab Screening   Immunizations Discussed/Encouraged (specific immunizations; Shingrix )  Prostate Cancer Screening     The above risks/problems have been discussed with the patient.  Pertinent information has been shared with the  patient in the After Visit Summary.  Follow up plans and orders are seen below in the Assessment/Plan Section.    Diagnoses and all orders for this visit:    1. Encounter for Medicare annual wellness exam (Primary)  -     CBC & Differential  -     Comprehensive Metabolic Panel  -     Hepatitis C Antibody  -     Lipid Panel With / Chol / HDL Ratio  -     PSA Screen    2. Essential hypertension  -     CBC & Differential  -     Comprehensive Metabolic Panel    3. Mixed hyperlipidemia  -     Comprehensive Metabolic Panel  -     Lipid Panel With / Chol / HDL Ratio    4. Screening PSA (prostate specific antigen)  -     PSA Screen    5. Encounter for hepatitis C screening test for low risk patient  -     Hepatitis C Antibody    6. Chronic coronary artery disease    7. Tobacco abuse    Other orders  -     albuterol sulfate  (90 Base) MCG/ACT inhaler; Inhale 2 puffs by mouth Every 4 (Four) Hours As Needed for Wheezing or Shortness of Air.  Dispense: 18 g; Refill: 3    Labs as noted  Follow heart healthy/low salt diet  Avoid processed foods  Monitor blood pressure as discussed  Exercise as tolerated up to 30 minutes 5 days per week  Take all medications as prescribed  Decrease salt intake, patient admits he eats a lot of salt on food  Significant stressors contributing to blood pressure at this time  Continue simvastatin for hyperlipidemia and CAD  Patient declines to see cardiologist, has had stents in place for greater than 10 years and has dampened pulses in left foot.  Offered to send him for stress testing and evaluation of decreased pulses in left leg, however he declines to see cardiologist or vascular surgeon at this time  Patient continues to smoke 1 pack of cigarettes daily, he declines to have CT scan of chest done for lung cancer screening and declines to have any help with smoking cessation at this time  Patient declines colonoscopy screening, he may consider this in the future  Patient declines AAA  screening ultrasound at this time also  Encourage shingles vaccine at local pharmacy, patient states he will check on this  Continue Symbicort inhaler, albuterol inhaler given to use as needed for underlying wheezing or shortness of breath  We will provide jury duty letter for patient due to underlying multiple medical problems, namely degenerative arthritis and inability to sit for prolonged periods  Patient takes tramadol 2-3 times daily for osteoarthritis, no signs of abuse and Codey appropriate today    Return to clinic in 6 months for routine follow-up visit    Follow Up:  Return in about 6 months (around 1/26/2022) for Next scheduled follow up.     An After Visit Summary and PPPS were given to the patient.

## 2021-08-27 DIAGNOSIS — M50.90 DISC DISORDER OF CERVICAL REGION: ICD-10-CM

## 2021-08-27 DIAGNOSIS — I10 ESSENTIAL HYPERTENSION: Primary | ICD-10-CM

## 2021-08-28 RX ORDER — LISINOPRIL 40 MG/1
40 TABLET ORAL DAILY
Qty: 30 TABLET | Refills: 6 | Status: SHIPPED | OUTPATIENT
Start: 2021-08-28 | End: 2022-03-18 | Stop reason: SDUPTHER

## 2021-08-28 NOTE — TELEPHONE ENCOUNTER
Rx Refill Note  Requested Prescriptions     Pending Prescriptions Disp Refills   • traMADol (ULTRAM) 50 MG tablet 90 tablet 2     Sig: Take 1 tablet by mouth 3 (Three) Times a Day As Needed for Moderate Pain .   • lisinopril (PRINIVIL,ZESTRIL) 40 MG tablet 30 tablet 6     Sig: Take 1 tablet by mouth once daily      Last office visit with prescribing clinician: 7/26/2021      Next office visit with prescribing clinician: 1/27/2022            JOE BOBO MA  08/28/21, 10:24 EDT     UDS: N/A  CSA: N/A

## 2021-08-30 RX ORDER — TRAMADOL HYDROCHLORIDE 50 MG/1
50 TABLET ORAL 3 TIMES DAILY PRN
Qty: 90 TABLET | Refills: 2 | Status: SHIPPED | OUTPATIENT
Start: 2021-08-30 | End: 2021-11-24 | Stop reason: SDUPTHER

## 2021-10-27 RX ORDER — SIMVASTATIN 80 MG
80 TABLET ORAL
Qty: 90 TABLET | Refills: 0 | Status: SHIPPED | OUTPATIENT
Start: 2021-10-27 | End: 2021-12-23 | Stop reason: SDUPTHER

## 2021-10-27 RX ORDER — FAMOTIDINE 10 MG
20 TABLET ORAL DAILY
Qty: 180 TABLET | Refills: 3 | Status: SHIPPED | OUTPATIENT
Start: 2021-10-27 | End: 2022-10-25 | Stop reason: SDUPTHER

## 2021-10-27 NOTE — TELEPHONE ENCOUNTER
Please tell patient he needs to get labs done before I will give him more than a 90-day supply of his Zocor.

## 2021-11-22 RX ORDER — CLONIDINE 0.3 MG/24H
PATCH, EXTENDED RELEASE TRANSDERMAL
Qty: 4 EACH | Refills: 12 | Status: SHIPPED | OUTPATIENT
Start: 2021-11-22 | End: 2022-11-25 | Stop reason: SDUPTHER

## 2021-11-24 DIAGNOSIS — M50.90 DISC DISORDER OF CERVICAL REGION: ICD-10-CM

## 2021-11-24 RX ORDER — TRAMADOL HYDROCHLORIDE 50 MG/1
50 TABLET ORAL 3 TIMES DAILY PRN
Qty: 90 TABLET | Refills: 0 | Status: SHIPPED | OUTPATIENT
Start: 2021-11-24 | End: 2022-01-26 | Stop reason: SDUPTHER

## 2021-11-24 NOTE — TELEPHONE ENCOUNTER
Rx Refill Note  Requested Prescriptions     Pending Prescriptions Disp Refills   • traMADol (ULTRAM) 50 MG tablet 90 tablet 2     Sig: Take 1 tablet by mouth 3 (Three) Times a Day As Needed for Moderate Pain .      Last office visit with prescribing clinician: 7/26/2021      Next office visit with prescribing clinician: 1/27/2022            JOE BOBO MA  11/24/21, 09:52 EST     UDS: N/A  CSA: N/A

## 2021-12-23 LAB
ALBUMIN SERPL-MCNC: 4.2 G/DL (ref 3.5–5.2)
ALBUMIN/GLOB SERPL: 2 G/DL
ALP SERPL-CCNC: 59 U/L (ref 39–117)
ALT SERPL-CCNC: 12 U/L (ref 1–41)
AST SERPL-CCNC: 14 U/L (ref 1–40)
BASOPHILS # BLD AUTO: 0.04 10*3/MM3 (ref 0–0.2)
BASOPHILS NFR BLD AUTO: 0.6 % (ref 0–1.5)
BILIRUB SERPL-MCNC: 0.6 MG/DL (ref 0–1.2)
BUN SERPL-MCNC: 17 MG/DL (ref 8–23)
BUN/CREAT SERPL: 19.8 (ref 7–25)
CALCIUM SERPL-MCNC: 9.5 MG/DL (ref 8.6–10.5)
CHLORIDE SERPL-SCNC: 100 MMOL/L (ref 98–107)
CHOLEST SERPL-MCNC: 130 MG/DL (ref 0–200)
CHOLEST/HDLC SERPL: 1.59 {RATIO}
CO2 SERPL-SCNC: 30.4 MMOL/L (ref 22–29)
CREAT SERPL-MCNC: 0.86 MG/DL (ref 0.76–1.27)
EOSINOPHIL # BLD AUTO: 0.22 10*3/MM3 (ref 0–0.4)
EOSINOPHIL NFR BLD AUTO: 3.1 % (ref 0.3–6.2)
ERYTHROCYTE [DISTWIDTH] IN BLOOD BY AUTOMATED COUNT: 12.8 % (ref 12.3–15.4)
GLOBULIN SER CALC-MCNC: 2.1 GM/DL
GLUCOSE SERPL-MCNC: 90 MG/DL (ref 65–99)
HCT VFR BLD AUTO: 47.5 % (ref 37.5–51)
HCV AB S/CO SERPL IA: <0.1 S/CO RATIO (ref 0–0.9)
HDLC SERPL-MCNC: 82 MG/DL (ref 40–60)
HGB BLD-MCNC: 16.8 G/DL (ref 13–17.7)
IMM GRANULOCYTES # BLD AUTO: 0.02 10*3/MM3 (ref 0–0.05)
IMM GRANULOCYTES NFR BLD AUTO: 0.3 % (ref 0–0.5)
LDLC SERPL CALC-MCNC: 37 MG/DL (ref 0–100)
LYMPHOCYTES # BLD AUTO: 1.45 10*3/MM3 (ref 0.7–3.1)
LYMPHOCYTES NFR BLD AUTO: 20.6 % (ref 19.6–45.3)
MCH RBC QN AUTO: 34.6 PG (ref 26.6–33)
MCHC RBC AUTO-ENTMCNC: 35.4 G/DL (ref 31.5–35.7)
MCV RBC AUTO: 97.7 FL (ref 79–97)
MONOCYTES # BLD AUTO: 0.7 10*3/MM3 (ref 0.1–0.9)
MONOCYTES NFR BLD AUTO: 10 % (ref 5–12)
NEUTROPHILS # BLD AUTO: 4.6 10*3/MM3 (ref 1.7–7)
NEUTROPHILS NFR BLD AUTO: 65.4 % (ref 42.7–76)
NRBC BLD AUTO-RTO: 0 /100 WBC (ref 0–0.2)
PLATELET # BLD AUTO: 294 10*3/MM3 (ref 140–450)
POTASSIUM SERPL-SCNC: 4.4 MMOL/L (ref 3.5–5.2)
PROT SERPL-MCNC: 6.3 G/DL (ref 6–8.5)
PSA SERPL-MCNC: 1.74 NG/ML (ref 0–4)
RBC # BLD AUTO: 4.86 10*6/MM3 (ref 4.14–5.8)
SODIUM SERPL-SCNC: 141 MMOL/L (ref 136–145)
TRIGL SERPL-MCNC: 46 MG/DL (ref 0–150)
VLDLC SERPL CALC-MCNC: 11 MG/DL (ref 5–40)
WBC # BLD AUTO: 7.03 10*3/MM3 (ref 3.4–10.8)

## 2021-12-23 RX ORDER — CLOPIDOGREL BISULFATE 75 MG/1
75 TABLET ORAL DAILY
Qty: 90 TABLET | Refills: 3 | Status: SHIPPED | OUTPATIENT
Start: 2021-12-23 | End: 2022-03-18 | Stop reason: SDUPTHER

## 2021-12-23 RX ORDER — CARVEDILOL 25 MG/1
25 TABLET ORAL 2 TIMES DAILY WITH MEALS
Qty: 180 TABLET | Refills: 3 | Status: SHIPPED | OUTPATIENT
Start: 2021-12-23 | End: 2022-03-18 | Stop reason: SDUPTHER

## 2021-12-23 RX ORDER — HYDROCHLOROTHIAZIDE 25 MG/1
25 TABLET ORAL DAILY
Qty: 90 TABLET | Refills: 3 | Status: SHIPPED | OUTPATIENT
Start: 2021-12-23 | End: 2022-03-18 | Stop reason: SDUPTHER

## 2021-12-23 RX ORDER — SIMVASTATIN 80 MG
80 TABLET ORAL
Qty: 90 TABLET | Refills: 0 | Status: SHIPPED | OUTPATIENT
Start: 2021-12-23 | End: 2022-03-18 | Stop reason: SDUPTHER

## 2022-01-26 DIAGNOSIS — M50.90 DISC DISORDER OF CERVICAL REGION: ICD-10-CM

## 2022-01-27 RX ORDER — TRAMADOL HYDROCHLORIDE 50 MG/1
50 TABLET ORAL 3 TIMES DAILY PRN
Qty: 90 TABLET | Refills: 2 | Status: SHIPPED | OUTPATIENT
Start: 2022-01-27 | End: 2022-03-18

## 2022-02-02 NOTE — PROGRESS NOTES
"Chief Complaint   Patient presents with   • Follow-up     1 month for BP.      Subjective   Lebron Miller is a 62 y.o. male.     HPI Comments: Patient here for follow-up of hypertension.  Patient was seen 1 month ago and blood pressure was elevated.  He has been having multiple stressors at home with family issues.  At that time he declined to start any more medications or adjust medications.  He wanted to follow blood pressure readings at home and return to clinic today for further evaluation.  He has been off all salt.  Using garlic powder, onion powder, Mrs Dash, pepper, and salt substitute.   His home reads are running 102-144/48-83.  Patient denies chest pain, shortness of breath or edema.  No palpitations.  He may drink some beers about 5 days a week.  Smokes about 1.5 PPD.   He found out his son will be in snf for 10 yrs.   His grand daughter will be home soon.        The following portions of the patient's history were reviewed and updated as appropriate: allergies, current medications, past family history, past medical history, past social history, past surgical history and problem list.    Review of Systems   Respiratory: Negative for shortness of breath.    Cardiovascular: Negative for chest pain, palpitations and leg swelling.   Psychiatric/Behavioral: Negative for dysphoric mood. The patient is nervous/anxious.        Objective   /90  Pulse 86  Temp 98.6 °F (37 °C)  Ht 68\" (172.7 cm)  Wt 167 lb 2 oz (75.8 kg)  SpO2 98%  BMI 25.41 kg/m2  Body mass index is 25.41 kg/(m^2).  Physical Exam   Constitutional: He is oriented to person, place, and time. He appears well-developed and well-nourished.   HENT:   Head: Normocephalic and atraumatic.   Eyes: EOM are normal. Pupils are equal, round, and reactive to light.   Cardiovascular: Normal rate, regular rhythm and normal heart sounds.    Pulmonary/Chest: Effort normal and breath sounds normal.   Musculoskeletal: He exhibits no edema. "   Neurological: He is alert and oriented to person, place, and time.   Psychiatric: He has a normal mood and affect. Judgment normal.   Nursing note and vitals reviewed.      Assessment/Plan   Lebron Miller is here today and the following problems have been addressed:      Lebron was seen today for follow-up.    Diagnoses and all orders for this visit:    Essential hypertension    Follow heart healthy/low salt diet  Avoid processed foods  Monitor blood pressure as discussed  Exercise as tolerated up to 30 minutes 5 days per week  Take all medications as prescribed  Continue current diet changes  Encouragement given regarding home situation    RTC 2 mo  Please note that portions of this note were completed with a voice recognition program.  Efforts were made to edit dictation, but occasionally words are mistranscribed.   Opioid Pregnancy And Lactation Text: These medications can lead to premature delivery and should be avoided during pregnancy. These medications are also present in breast milk in small amounts.

## 2022-02-14 ENCOUNTER — TELEPHONE (OUTPATIENT)
Dept: INTERNAL MEDICINE | Facility: CLINIC | Age: 67
End: 2022-02-14

## 2022-03-14 RX ORDER — ALBUTEROL SULFATE 90 UG/1
2 AEROSOL, METERED RESPIRATORY (INHALATION) EVERY 4 HOURS PRN
Qty: 18 G | Refills: 3 | Status: SHIPPED | OUTPATIENT
Start: 2022-03-14 | End: 2023-03-15 | Stop reason: SDUPTHER

## 2022-03-18 ENCOUNTER — OFFICE VISIT (OUTPATIENT)
Dept: INTERNAL MEDICINE | Facility: CLINIC | Age: 67
End: 2022-03-18

## 2022-03-18 VITALS
SYSTOLIC BLOOD PRESSURE: 142 MMHG | HEART RATE: 61 BPM | TEMPERATURE: 97 F | HEIGHT: 68 IN | OXYGEN SATURATION: 98 % | DIASTOLIC BLOOD PRESSURE: 90 MMHG | WEIGHT: 152 LBS | BODY MASS INDEX: 23.04 KG/M2

## 2022-03-18 DIAGNOSIS — Z51.81 MEDICATION MONITORING ENCOUNTER: ICD-10-CM

## 2022-03-18 DIAGNOSIS — I25.10 CHRONIC CORONARY ARTERY DISEASE: ICD-10-CM

## 2022-03-18 DIAGNOSIS — E78.2 MIXED HYPERLIPIDEMIA: ICD-10-CM

## 2022-03-18 DIAGNOSIS — I10 ESSENTIAL HYPERTENSION: ICD-10-CM

## 2022-03-18 PROBLEM — Z11.59 ENCOUNTER FOR HEPATITIS C SCREENING TEST FOR LOW RISK PATIENT: Status: RESOLVED | Noted: 2021-07-26 | Resolved: 2022-03-18

## 2022-03-18 PROCEDURE — 99214 OFFICE O/P EST MOD 30 MIN: CPT | Performed by: INTERNAL MEDICINE

## 2022-03-18 PROCEDURE — 90732 PPSV23 VACC 2 YRS+ SUBQ/IM: CPT | Performed by: INTERNAL MEDICINE

## 2022-03-18 PROCEDURE — G0009 ADMIN PNEUMOCOCCAL VACCINE: HCPCS | Performed by: INTERNAL MEDICINE

## 2022-03-18 RX ORDER — HYDROCHLOROTHIAZIDE 25 MG/1
25 TABLET ORAL DAILY
Qty: 90 TABLET | Refills: 1 | Status: SHIPPED | OUTPATIENT
Start: 2022-03-18 | End: 2022-09-22 | Stop reason: SDUPTHER

## 2022-03-18 RX ORDER — SIMVASTATIN 80 MG
80 TABLET ORAL
Qty: 90 TABLET | Refills: 1 | Status: SHIPPED | OUTPATIENT
Start: 2022-03-18 | End: 2022-10-25 | Stop reason: SDUPTHER

## 2022-03-18 RX ORDER — CARVEDILOL 25 MG/1
25 TABLET ORAL 2 TIMES DAILY WITH MEALS
Qty: 180 TABLET | Refills: 1 | Status: SHIPPED | OUTPATIENT
Start: 2022-03-18 | End: 2022-09-22 | Stop reason: SDUPTHER

## 2022-03-18 RX ORDER — LISINOPRIL 40 MG/1
40 TABLET ORAL DAILY
Qty: 90 TABLET | Refills: 1 | Status: SHIPPED | OUTPATIENT
Start: 2022-03-18 | End: 2022-09-22 | Stop reason: SDUPTHER

## 2022-03-18 RX ORDER — CLOPIDOGREL BISULFATE 75 MG/1
75 TABLET ORAL DAILY
Qty: 90 TABLET | Refills: 1 | Status: SHIPPED | OUTPATIENT
Start: 2022-03-18 | End: 2022-09-22 | Stop reason: SDUPTHER

## 2022-03-18 NOTE — PROGRESS NOTES
Chief Complaint   Patient presents with   • Follow-up     For HLD, HTN, and major depressive disorder.     Subjective   Lebron Miller is a 67 y.o. male.        Here today for follow up of HTN, HLD, GERD, depression, CAD, allergies and cervical disc disease.   HTN/HLD/CAD- BP is elevated today, he does not check it at home.  No CP, SOA, palpitations or edema.  He avoids fast food, does not eat much salt either. He is compliant with all meds.  He takes cholesterol med every night.  His cardiac stent was placed approximately 15 to 20 years ago.  He has not seen a cardiologist since that time and is not interested in seeing anyone.  Depression- he is off all meds at this time. He denies any depression or anxiety.  Sleeps well at night, better than he has in a long time.   GERD- he takes pepcid daily and this prevents any GERD sxs.   Allergies- his allergies have been bothersome.  He does not take any thing for allergies, he lets it take its course    Neck/back pain-  he takes tramadol for it 1-2 times a day for neck and DJD pain.  The medication does not always help.  He has not tried tylenol, but says it may help him better and he may try.  He really would like to just discontinue tramadol.  HCM- all vaccines UTD except Shingrix.   He declines colonoscopy.  He takes vit d 2000 u a day.  Tobacco abuse- he continues to smoke a PPD.  He is using symbicort 2 puffs 1-2 times a day.  He is not not interested in quitting.  He declines low dose CT screening.        The following portions of the patient's history were reviewed and updated as appropriate: allergies, current medications, past family history, past medical history, past social history, past surgical history and problem list.    Review of Systems   Constitutional: Negative for activity change, appetite change and unexpected weight change.   HENT: Positive for congestion, postnasal drip and rhinorrhea.    Eyes: Negative for visual disturbance.   Respiratory: Negative  "for shortness of breath.    Cardiovascular: Negative for chest pain, palpitations and leg swelling.   Gastrointestinal: Negative for abdominal pain.   Genitourinary: Negative for hematuria.   Musculoskeletal: Positive for arthralgias, back pain and neck pain.   Allergic/Immunologic: Positive for environmental allergies.   Neurological: Negative for headaches.   Psychiatric/Behavioral: Negative for dysphoric mood and sleep disturbance.       Objective   /90   Pulse 61   Temp 97 °F (36.1 °C)   Ht 172.7 cm (68\")   Wt 68.9 kg (152 lb)   SpO2 98%   BMI 23.11 kg/m²   Body mass index is 23.11 kg/m².  Physical Exam  Vitals and nursing note reviewed.   Constitutional:       General: He is not in acute distress.     Appearance: Normal appearance. He is well-developed. He is not ill-appearing.      Comments: Kind and pleasant man, appears stated age and in NAD today   HENT:      Head: Normocephalic and atraumatic.      Right Ear: External ear normal.      Left Ear: External ear normal.      Nose: Congestion present.      Mouth/Throat:      Pharynx: No posterior oropharyngeal erythema.      Comments: White postnasal drip noted  Eyes:      General:         Right eye: No discharge.         Left eye: No discharge.      Extraocular Movements: Extraocular movements intact.      Conjunctiva/sclera: Conjunctivae normal.      Pupils: Pupils are equal, round, and reactive to light.   Neck:      Thyroid: No thyromegaly.      Vascular: No carotid bruit.      Comments: No thyromegaly or mass  Cardiovascular:      Rate and Rhythm: Normal rate and regular rhythm.      Pulses: Normal pulses.      Heart sounds: Normal heart sounds. No murmur heard.  Pulmonary:      Effort: Pulmonary effort is normal. No respiratory distress.      Breath sounds: Normal breath sounds. No wheezing.   Abdominal:      General: Bowel sounds are normal. There is no distension.      Palpations: Abdomen is soft.      Tenderness: There is no abdominal " tenderness.   Musculoskeletal:         General: Normal range of motion.      Cervical back: Normal range of motion and neck supple.      Right lower leg: No edema.      Left lower leg: No edema.   Lymphadenopathy:      Cervical: No cervical adenopathy.   Skin:     General: Skin is warm.      Findings: No rash.   Neurological:      General: No focal deficit present.      Mental Status: He is alert and oriented to person, place, and time. Mental status is at baseline.      Cranial Nerves: No cranial nerve deficit.      Motor: No weakness.      Coordination: Coordination normal.      Gait: Gait normal.   Psychiatric:         Mood and Affect: Mood normal.         Behavior: Behavior normal.         Thought Content: Thought content normal.         Judgment: Judgment normal.         Assessment/Plan   Lebron Miller is here today and the following problems have been addressed:      Diagnoses and all orders for this visit:    1. Essential hypertension    2. Mixed hyperlipidemia    3. Chronic coronary artery disease    4. Medication monitoring encounter  -     Compliance Drug Analysis, Ur - Urine, Clean Catch    Other orders  -     carvedilol (COREG) 25 MG tablet; Take 1 tablet by mouth 2 (Two) Times a Day With Meals.  Dispense: 180 tablet; Refill: 1  -     clopidogrel (PLAVIX) 75 MG tablet; Take 1 tablet by mouth Daily.  Dispense: 90 tablet; Refill: 1  -     hydroCHLOROthiazide (HYDRODIURIL) 25 MG tablet; Take 1 tablet by mouth Daily.  Dispense: 90 tablet; Refill: 1  -     lisinopril (PRINIVIL,ZESTRIL) 40 MG tablet; Take 1 tablet by mouth Daily.  Dispense: 90 tablet; Refill: 1  -     simvastatin (ZOCOR) 80 MG tablet; Take 1 tablet by mouth every night at bedtime.  Dispense: 90 tablet; Refill: 1  -     Pneumococcal Polysaccharide Vaccine 23-Valent (PPSV23) Greater Than or Equal To 1yo Subcutaneous / IM        Follow heart healthy/low salt diet  Avoid processed foods  Monitor blood pressure as discussed  Exercise as tolerated  up to 150 minutes per week  Take all medications as prescribed  Will obtain UDS today, however patient would like to discontinue tramadol and we will no longer need to do UDS after today  Recommend he take Tylenol extra strength or Tylenol arthritis 2 tablets 2-3 times daily as needed for his arthritis pain  Continue simvastatin for hyperlipidemia  Continue daily aspirin and Plavix due to underlying CAD, he is not interested in seeing a cardiologist at this time  GERD is well controlled with over-the-counter Pepcid  Continue Symbicort for COPD and take albuterol as needed  Again he is not interested in low-dose CT scan of chest due to smoking history  Pneumovax 23 provided today    Return in about 4 months (around 7/18/2022) for Medicare Wellness.      Marilyn K. Vermeesch, MD      Please note that portions of this note were completed with a voice recognition program.  Efforts were made to edit dictation, but occasionally words are mistranscribed.

## 2022-03-24 LAB — DRUGS UR: NORMAL

## 2022-03-29 RX ORDER — BUDESONIDE AND FORMOTEROL FUMARATE DIHYDRATE 80; 4.5 UG/1; UG/1
AEROSOL RESPIRATORY (INHALATION)
Qty: 10.2 G | Refills: 3 | Status: SHIPPED | OUTPATIENT
Start: 2022-03-29

## 2022-03-29 NOTE — TELEPHONE ENCOUNTER
Rx Refill Note  Requested Prescriptions     Pending Prescriptions Disp Refills   • budesonide-formoterol (Symbicort) 80-4.5 MCG/ACT inhaler 10.2 g 3     Sig: Inhale 2 puffs by mouth 2 (Two) Times a Day.      Last office visit with prescribing clinician: 3/18/2022      Next office visit with prescribing clinician: 7/29/2022            Amber Drew LPN  03/29/22, 09:04 EDT

## 2022-06-08 ENCOUNTER — TELEPHONE (OUTPATIENT)
Dept: INTERNAL MEDICINE | Facility: CLINIC | Age: 67
End: 2022-06-08

## 2022-06-08 NOTE — TELEPHONE ENCOUNTER
Otis called back and found this was faxed over this morning and to disregard previous message. Thanks.

## 2022-06-08 NOTE — TELEPHONE ENCOUNTER
Otis with [facility] pharmacy called and states she faxed over a form last week needing dr vermeesch signature on for patient to receive medication for free. She would like to make sure we got this and to check status. Call her at [phone]

## 2022-08-30 ENCOUNTER — OFFICE VISIT (OUTPATIENT)
Dept: INTERNAL MEDICINE | Facility: CLINIC | Age: 67
End: 2022-08-30

## 2022-08-30 VITALS
SYSTOLIC BLOOD PRESSURE: 124 MMHG | DIASTOLIC BLOOD PRESSURE: 82 MMHG | OXYGEN SATURATION: 96 % | TEMPERATURE: 97.7 F | BODY MASS INDEX: 22.73 KG/M2 | HEART RATE: 62 BPM | HEIGHT: 68 IN | WEIGHT: 150 LBS

## 2022-08-30 DIAGNOSIS — Z00.00 ENCOUNTER FOR MEDICARE ANNUAL WELLNESS EXAM: Primary | ICD-10-CM

## 2022-08-30 PROCEDURE — G0439 PPPS, SUBSEQ VISIT: HCPCS | Performed by: INTERNAL MEDICINE

## 2022-08-30 PROCEDURE — 99397 PER PM REEVAL EST PAT 65+ YR: CPT | Performed by: INTERNAL MEDICINE

## 2022-08-30 PROCEDURE — 1170F FXNL STATUS ASSESSED: CPT | Performed by: INTERNAL MEDICINE

## 2022-08-30 PROCEDURE — 96160 PT-FOCUSED HLTH RISK ASSMT: CPT | Performed by: INTERNAL MEDICINE

## 2022-08-30 PROCEDURE — 1159F MED LIST DOCD IN RCRD: CPT | Performed by: INTERNAL MEDICINE

## 2022-08-30 NOTE — PROGRESS NOTES
The ABCs of the Annual Wellness Visit  Subsequent Medicare Wellness Visit    Chief Complaint   Patient presents with   • Annual Exam   • Medicare Wellness-subsequent      Subjective    History of Present Illness:  Lebron Miller is a 67 y.o. male who presents for a Subsequent Medicare Wellness Visit and annual physical exam.  PMH of HTN, HLD, CAD, allergies, vitamin D deficiency, depression, arthritis, cervical disc disease and psoriasis.  Patient continues to smoke regularly.  All vaccines up-to-date except second COVID booster and shingles vaccine.  Patient declines colonoscopy screening.  BP and heart rate are well controlled today.  He has history of cardiac stents placed almost 20 years ago, denies any CP, SOA or palpitations.  Declines to see a cardiologist.  He remains on aspirin, Plavix and Coreg.  He is compliant with simvastatin every night for hyperlipidemia.  He continues to smoke 1 pack or less of cigarettes daily and is not interested in smoking cessation.  He remains on Symbicort inhaler and uses albuterol on rare occasion for underlying shortness of breath likely due to COPD.  He is not interested in low-dose CT scan of lungs to screen for lung cancer.  He remains on vit D daily.  He is off tramadol and uses tylenol as needed for his back and says it works better for him.     The following portions of the patient's history were reviewed and   updated as appropriate: allergies, current medications, past family history, past medical history, past social history, past surgical history and problem list.    Compared to one year ago, the patient feels his physical   health is the same.    Compared to one year ago, the patient feels his mental   health is the same.    Recent Hospitalizations:  He was not admitted to the hospital during the last year.       Current Medical Providers:  Patient Care Team:  Vermeesch, Marilyn K, MD as PCP - General (Internal Medicine & Pediatrics)    Outpatient Medications Prior  to Visit   Medication Sig Dispense Refill   • albuterol sulfate  (90 Base) MCG/ACT inhaler Inhale 2 puffs by mouth Every 4 (Four) Hours As Needed for Wheezing or Shortness of Air. 18 g 3   • aspirin 81 MG tablet Take  by mouth daily.     • budesonide-formoterol (Symbicort) 80-4.5 MCG/ACT inhaler Inhale 2 puffs by mouth twice daily 10.2 g 3   • carvedilol (COREG) 25 MG tablet Take 1 tablet by mouth 2 (Two) Times a Day With Meals. 180 tablet 1   • Cholecalciferol (VITAMIN D) 2000 UNITS tablet Take  by mouth.     • cloNIDine (CATAPRES-TTS) 0.3 MG/24HR patch Place 1 patch on the skin 1 (One) Time Per Week. 4 each 12   • clopidogrel (PLAVIX) 75 MG tablet Take 1 tablet by mouth Daily. 90 tablet 1   • famotidine (Acid Reducer) 10 MG tablet Take 2 tablets by mouth Daily. 180 tablet 3   • hydroCHLOROthiazide (HYDRODIURIL) 25 MG tablet Take 1 tablet by mouth Daily. 90 tablet 1   • lisinopril (PRINIVIL,ZESTRIL) 40 MG tablet Take 1 tablet by mouth Daily. 90 tablet 1   • simvastatin (ZOCOR) 80 MG tablet Take 1 tablet by mouth every night at bedtime. 90 tablet 1   • Zoster Vac Recomb Adjuvanted 50 MCG/0.5ML reconstituted suspension Inject per protocol, second dose in 2-6 months 1 each 1     No facility-administered medications prior to visit.       No opioid medication identified on active medication list. I have reviewed chart for other potential  high risk medication/s and harmful drug interactions in the elderly.          Aspirin is on active medication list. Aspirin use is indicated based on review of current medical condition/s. Pros and cons of this therapy have been discussed today. Benefits of this medication outweigh potential harm.  Patient has been encouraged to continue taking this medication.  .      Patient Active Problem List   Diagnosis   • Atopic rhinitis   • Chronic coronary artery disease   • Arthritis   • Disc disorder of cervical region   • Hyperlipidemia   • Hypertension   • Psoriasis   • Tobacco  "abuse   • Vitamin D deficiency   • Moderate single current episode of major depressive disorder (HCC)   • Screening PSA (prostate specific antigen)   • Encounter for Medicare annual wellness exam     Advance Care Planning  Advance Directive is not on file.  ACP discussion was held with the patient during this visit. Patient does not have an advance directive, information provided.    Review of Systems   Constitutional: Negative for activity change.   HENT: Positive for congestion and postnasal drip.    Eyes: Negative.    Respiratory: Positive for shortness of breath and wheezing.    Cardiovascular: Negative.    Gastrointestinal: Negative.    Endocrine: Negative.    Genitourinary: Negative.    Musculoskeletal: Positive for back pain.   Skin: Negative.    Allergic/Immunologic: Positive for environmental allergies.   Hematological: Bruises/bleeds easily.   Psychiatric/Behavioral: Negative.         Objective    Vitals:    08/30/22 1531   BP: 124/82   BP Location: Left arm   Patient Position: Sitting   Cuff Size: Adult   Pulse: 62   Temp: 97.7 °F (36.5 °C)   TempSrc: Temporal   SpO2: 96%   Weight: 68 kg (150 lb)   Height: 172.7 cm (68\")   PainSc: 0-No pain     Estimated body mass index is 22.81 kg/m² as calculated from the following:    Height as of this encounter: 172.7 cm (68\").    Weight as of this encounter: 68 kg (150 lb).    BMI is within normal parameters. No other follow-up for BMI required.      Does the patient have evidence of cognitive impairment? No    Physical Exam  Vitals and nursing note reviewed.   Constitutional:       General: He is not in acute distress.     Appearance: Normal appearance. He is well-developed. He is not ill-appearing.      Comments: Kind and pleasant man, appears older than stated age and in NAD today   HENT:      Head: Normocephalic and atraumatic.      Right Ear: Tympanic membrane, ear canal and external ear normal.      Left Ear: Tympanic membrane, ear canal and external ear " normal.      Nose: No congestion.      Mouth/Throat:      Pharynx: No posterior oropharyngeal erythema.   Eyes:      General:         Right eye: No discharge.         Left eye: No discharge.      Extraocular Movements: Extraocular movements intact.      Conjunctiva/sclera: Conjunctivae normal.      Pupils: Pupils are equal, round, and reactive to light.   Neck:      Thyroid: No thyromegaly.      Vascular: No carotid bruit.      Comments: No thyromegaly or mass  Cardiovascular:      Rate and Rhythm: Normal rate and regular rhythm.      Pulses: Normal pulses.      Heart sounds: Normal heart sounds. No murmur heard.  Pulmonary:      Effort: Pulmonary effort is normal. No respiratory distress.      Breath sounds: No wheezing or rhonchi.      Comments: Decreased breath sounds throughout all lung fields  Abdominal:      General: Bowel sounds are normal. There is no distension.      Palpations: Abdomen is soft.      Tenderness: There is no abdominal tenderness.   Musculoskeletal:         General: Normal range of motion.      Cervical back: Normal range of motion and neck supple.      Right lower leg: No edema.      Left lower leg: No edema.   Lymphadenopathy:      Cervical: No cervical adenopathy.   Skin:     General: Skin is warm.      Findings: No rash.   Neurological:      General: No focal deficit present.      Mental Status: He is alert and oriented to person, place, and time. Mental status is at baseline.      Cranial Nerves: No cranial nerve deficit.      Motor: No weakness.      Coordination: Coordination normal.      Gait: Gait normal.   Psychiatric:         Mood and Affect: Mood normal.         Behavior: Behavior normal.         Thought Content: Thought content normal.         Judgment: Judgment normal.                 HEALTH RISK ASSESSMENT    Smoking Status:  Social History     Tobacco Use   Smoking Status Current Every Day Smoker   • Packs/day: 1.00   • Types: Cigarettes   • Start date: 1968   Smokeless  Tobacco Never Used     Alcohol Consumption:  Social History     Substance and Sexual Activity   Alcohol Use No     Fall Risk Screen:    LUZADI Fall Risk Assessment was completed, and patient is at LOW risk for falls.Assessment completed on:8/30/2022    Depression Screening:  PHQ-2/PHQ-9 Depression Screening 8/30/2022   Retired PHQ-9 Total Score -   Retired Total Score -   Little Interest or Pleasure in Doing Things 0-->not at all   Feeling Down, Depressed or Hopeless 0-->not at all   PHQ-9: Brief Depression Severity Measure Score 0       Health Habits and Functional and Cognitive Screening:  Functional & Cognitive Status 8/30/2022   Do you have difficulty preparing food and eating? No   Do you have difficulty bathing yourself, getting dressed or grooming yourself? No   Do you have difficulty using the toilet? No   Do you have difficulty moving around from place to place? No   Do you have trouble with steps or getting out of a bed or a chair? No   Current Diet Well Balanced Diet   Dental Exam Not up to date   Eye Exam Not up to date   Exercise (times per week) 7 times per week   Current Exercises Include Yard Work   Current Exercise Activities Include -   Do you need help using the phone?  No   Are you deaf or do you have serious difficulty hearing?  No   Do you need help with transportation? No   Do you need help shopping? No   Do you need help preparing meals?  No   Do you need help with housework?  No   Do you need help with laundry? No   Do you need help taking your medications? No   Do you need help managing money? No   Do you ever drive or ride in a car without wearing a seat belt? No   Have you felt unusual stress, anger or loneliness in the last month? No   Who do you live with? Alone   If you need help, do you have trouble finding someone available to you? No   Have you been bothered in the last four weeks by sexual problems? -   Do you have difficulty concentrating, remembering or making decisions? No        Age-appropriate Screening Schedule:  Refer to the list below for future screening recommendations based on patient's age, sex and/or medical conditions. Orders for these recommended tests are listed in the plan section. The patient has been provided with a written plan.    Health Maintenance   Topic Date Due   • ZOSTER VACCINE (2 of 2) 03/15/2017   • INFLUENZA VACCINE  10/01/2022   • LIPID PANEL  12/22/2022   • TDAP/TD VACCINES  Discontinued              Assessment & Plan   CMS Preventative Services Quick Reference  Risk Factors Identified During Encounter  Cardiovascular Disease  Inactivity/Sedentary  Tobacco Use/Dependance (use dotphrase .tobaccocessation for documentation)  The above risks/problems have been discussed with the patient.  Follow up actions/plans if indicated are seen below in the Assessment/Plan Section.  Pertinent information has been shared with the patient in the After Visit Summary.    Diagnoses and all orders for this visit:    1. Encounter for Medicare annual wellness exam (Primary)  -     CBC & Differential  -     Comprehensive Metabolic Panel  -     Lipid Panel With / Chol / HDL Ratio    Follow heart healthy/low cholesterol/low salt diet  Avoid processed/fried foods/fast food  Exercise as tolerated up to 30 minutes 5 days a week  Take all medications as prescribed  Continue simvastatin for hyperlipidemia  BP is well controlled on current medications  Continue Symbicort and albuterol for underlying COPD due to smoking  He remains on aspirin and Plavix for CAD, declines to see cardiology  GERD is well controlled with Pepcid as needed  He declines AAA screening  He declines CT lung for lung cancer screening  He is not interested in tobacco cessation at this time  Labs as noted    Follow Up:   Return in about 6 months (around 2/28/2023) for Next scheduled follow up.     An After Visit Summary and PPPS were made available to the patient.

## 2022-09-22 RX ORDER — CARVEDILOL 25 MG/1
25 TABLET ORAL 2 TIMES DAILY WITH MEALS
Qty: 180 TABLET | Refills: 1 | Status: SHIPPED | OUTPATIENT
Start: 2022-09-22 | End: 2023-03-14 | Stop reason: SDUPTHER

## 2022-09-22 RX ORDER — HYDROCHLOROTHIAZIDE 25 MG/1
25 TABLET ORAL DAILY
Qty: 90 TABLET | Refills: 1 | Status: SHIPPED | OUTPATIENT
Start: 2022-09-22 | End: 2023-03-14 | Stop reason: SDUPTHER

## 2022-09-22 RX ORDER — CLOPIDOGREL BISULFATE 75 MG/1
75 TABLET ORAL DAILY
Qty: 90 TABLET | Refills: 1 | Status: SHIPPED | OUTPATIENT
Start: 2022-09-22 | End: 2023-03-14 | Stop reason: SDUPTHER

## 2022-09-22 RX ORDER — LISINOPRIL 40 MG/1
40 TABLET ORAL DAILY
Qty: 90 TABLET | Refills: 1 | Status: SHIPPED | OUTPATIENT
Start: 2022-09-22 | End: 2023-03-14 | Stop reason: SDUPTHER

## 2022-10-25 RX ORDER — FAMOTIDINE 10 MG
20 TABLET ORAL DAILY
Qty: 180 TABLET | Refills: 3 | Status: SHIPPED | OUTPATIENT
Start: 2022-10-25

## 2022-10-25 RX ORDER — SIMVASTATIN 80 MG
80 TABLET ORAL
Qty: 90 TABLET | Refills: 1 | Status: SHIPPED | OUTPATIENT
Start: 2022-10-25 | End: 2023-03-14 | Stop reason: SDUPTHER

## 2022-11-28 RX ORDER — CLONIDINE 0.3 MG/24H
PATCH, EXTENDED RELEASE TRANSDERMAL
Qty: 12 EACH | Refills: 1 | Status: SHIPPED | OUTPATIENT
Start: 2022-11-28

## 2022-12-22 LAB
ALBUMIN SERPL-MCNC: 4.1 G/DL (ref 3.5–5.2)
ALBUMIN/GLOB SERPL: 1.8 G/DL
ALP SERPL-CCNC: 51 U/L (ref 39–117)
ALT SERPL-CCNC: 11 U/L (ref 1–41)
AST SERPL-CCNC: 11 U/L (ref 1–40)
BASOPHILS # BLD AUTO: 0.05 10*3/MM3 (ref 0–0.2)
BASOPHILS NFR BLD AUTO: 0.8 % (ref 0–1.5)
BILIRUB SERPL-MCNC: 0.8 MG/DL (ref 0–1.2)
BUN SERPL-MCNC: 21 MG/DL (ref 8–23)
BUN/CREAT SERPL: 23.9 (ref 7–25)
CALCIUM SERPL-MCNC: 9.5 MG/DL (ref 8.6–10.5)
CHLORIDE SERPL-SCNC: 102 MMOL/L (ref 98–107)
CHOLEST SERPL-MCNC: 146 MG/DL (ref 0–200)
CHOLEST/HDLC SERPL: 1.83 {RATIO}
CO2 SERPL-SCNC: 30.4 MMOL/L (ref 22–29)
CREAT SERPL-MCNC: 0.88 MG/DL (ref 0.76–1.27)
EGFRCR SERPLBLD CKD-EPI 2021: 94.2 ML/MIN/1.73
EOSINOPHIL # BLD AUTO: 0.21 10*3/MM3 (ref 0–0.4)
EOSINOPHIL NFR BLD AUTO: 3.3 % (ref 0.3–6.2)
ERYTHROCYTE [DISTWIDTH] IN BLOOD BY AUTOMATED COUNT: 13.4 % (ref 12.3–15.4)
GLOBULIN SER CALC-MCNC: 2.3 GM/DL
GLUCOSE SERPL-MCNC: 92 MG/DL (ref 65–99)
HCT VFR BLD AUTO: 45.5 % (ref 37.5–51)
HDLC SERPL-MCNC: 80 MG/DL (ref 40–60)
HGB BLD-MCNC: 16.3 G/DL (ref 13–17.7)
IMM GRANULOCYTES # BLD AUTO: 0.01 10*3/MM3 (ref 0–0.05)
IMM GRANULOCYTES NFR BLD AUTO: 0.2 % (ref 0–0.5)
LDLC SERPL CALC-MCNC: 55 MG/DL (ref 0–100)
LYMPHOCYTES # BLD AUTO: 1.8 10*3/MM3 (ref 0.7–3.1)
LYMPHOCYTES NFR BLD AUTO: 28.7 % (ref 19.6–45.3)
MCH RBC QN AUTO: 35.4 PG (ref 26.6–33)
MCHC RBC AUTO-ENTMCNC: 35.8 G/DL (ref 31.5–35.7)
MCV RBC AUTO: 98.7 FL (ref 79–97)
MONOCYTES # BLD AUTO: 0.64 10*3/MM3 (ref 0.1–0.9)
MONOCYTES NFR BLD AUTO: 10.2 % (ref 5–12)
NEUTROPHILS # BLD AUTO: 3.56 10*3/MM3 (ref 1.7–7)
NEUTROPHILS NFR BLD AUTO: 56.8 % (ref 42.7–76)
PLATELET # BLD AUTO: 290 10*3/MM3 (ref 140–450)
POTASSIUM SERPL-SCNC: 5.1 MMOL/L (ref 3.5–5.2)
PROT SERPL-MCNC: 6.4 G/DL (ref 6–8.5)
RBC # BLD AUTO: 4.61 10*6/MM3 (ref 4.14–5.8)
SODIUM SERPL-SCNC: 139 MMOL/L (ref 136–145)
TRIGL SERPL-MCNC: 48 MG/DL (ref 0–150)
VLDLC SERPL CALC-MCNC: 11 MG/DL (ref 5–40)
WBC # BLD AUTO: 6.27 10*3/MM3 (ref 3.4–10.8)

## 2023-03-14 ENCOUNTER — OFFICE VISIT (OUTPATIENT)
Dept: INTERNAL MEDICINE | Facility: CLINIC | Age: 68
End: 2023-03-14
Payer: MEDICARE

## 2023-03-14 VITALS
TEMPERATURE: 97.5 F | OXYGEN SATURATION: 95 % | SYSTOLIC BLOOD PRESSURE: 146 MMHG | BODY MASS INDEX: 22.28 KG/M2 | WEIGHT: 147 LBS | DIASTOLIC BLOOD PRESSURE: 80 MMHG | HEIGHT: 68 IN | HEART RATE: 71 BPM

## 2023-03-14 DIAGNOSIS — I10 PRIMARY HYPERTENSION: Primary | ICD-10-CM

## 2023-03-14 DIAGNOSIS — I25.10 CHRONIC CORONARY ARTERY DISEASE: ICD-10-CM

## 2023-03-14 DIAGNOSIS — E78.2 MIXED HYPERLIPIDEMIA: ICD-10-CM

## 2023-03-14 PROCEDURE — 1159F MED LIST DOCD IN RCRD: CPT | Performed by: INTERNAL MEDICINE

## 2023-03-14 PROCEDURE — 3077F SYST BP >= 140 MM HG: CPT | Performed by: INTERNAL MEDICINE

## 2023-03-14 PROCEDURE — 3079F DIAST BP 80-89 MM HG: CPT | Performed by: INTERNAL MEDICINE

## 2023-03-14 PROCEDURE — 99214 OFFICE O/P EST MOD 30 MIN: CPT | Performed by: INTERNAL MEDICINE

## 2023-03-14 PROCEDURE — 1160F RVW MEDS BY RX/DR IN RCRD: CPT | Performed by: INTERNAL MEDICINE

## 2023-03-14 RX ORDER — CARVEDILOL 25 MG/1
25 TABLET ORAL 2 TIMES DAILY WITH MEALS
Qty: 180 TABLET | Refills: 1 | Status: SHIPPED | OUTPATIENT
Start: 2023-03-14

## 2023-03-14 RX ORDER — SIMVASTATIN 80 MG
80 TABLET ORAL
Qty: 90 TABLET | Refills: 1 | Status: CANCELLED | OUTPATIENT
Start: 2023-03-14

## 2023-03-14 RX ORDER — HYDROCHLOROTHIAZIDE 25 MG/1
25 TABLET ORAL DAILY
Qty: 90 TABLET | Refills: 1 | Status: SHIPPED | OUTPATIENT
Start: 2023-03-14

## 2023-03-14 RX ORDER — LISINOPRIL 40 MG/1
40 TABLET ORAL DAILY
Qty: 90 TABLET | Refills: 1 | Status: SHIPPED | OUTPATIENT
Start: 2023-03-14

## 2023-03-14 RX ORDER — CLOPIDOGREL BISULFATE 75 MG/1
75 TABLET ORAL DAILY
Qty: 90 TABLET | Refills: 1 | Status: SHIPPED | OUTPATIENT
Start: 2023-03-14

## 2023-03-14 RX ORDER — SIMVASTATIN 80 MG
80 TABLET ORAL
Qty: 90 TABLET | Refills: 1 | Status: SHIPPED | OUTPATIENT
Start: 2023-03-14

## 2023-03-14 NOTE — PROGRESS NOTES
Chief Complaint   Patient presents with   • Follow-up     6 months for HLD and HTN.     Leonel Miller is a 68 y.o. male.     History of Present Illness  Here today for follow up of HTN, HLD, GERD, H/O depression, CAD, allergies and cervical disc disease.   HTN/HLD/CAD- BP is elevated today, he does not check it at home-he just came up the steps.  No CP, palpitations or edema. He takes cholesterol med every night.  Cholesterol panel in December 2022 revealed HDL of 80 and otherwise was normal.  His cardiac stent was placed approximately 15 to 20 years ago.  He has not seen a cardiologist since that time and is not interested in seeing anyone-says he will be happy if he makes it to age 75.  He remains on plavix and ASA  Depression- He denies any depression or anxiety.  Sleeps well at night.   GERD- he takes pepcid daily and has good control of GERD symptoms.   Allergies/tobacco abuse- his allergies have been bothersome. He has been using his symbicort regularly and albuterol as needed.  Continues to smoke about a PPD and is not interested in a CT of lungs.    Neck/back pain-  He is off tramadol and feels the same without it.  He uses tylenol when needed for pain.  He is also on turmeric for DJD.   HCM- all vaccines UTD except Shingrix.   He declines colonoscopy.  He takes vit d 2000 u a day.  He complains he had diarrhea yesterday and some this morning, none since 0800.  He had some burning in stomach earlier but feels well now.  Has an appetite.  No NV.         The following portions of the patient's history were reviewed and updated as appropriate: allergies, current medications, past family history, past medical history, past social history, past surgical history and problem list.    Review of Systems   Constitutional: Negative for activity change, appetite change and unexpected weight change.   Eyes: Negative for visual disturbance.   Respiratory: Positive for shortness of breath.    Cardiovascular:  "Negative for chest pain, palpitations and leg swelling.   Gastrointestinal: Positive for diarrhea. Negative for abdominal pain.   Genitourinary: Negative for hematuria.   Musculoskeletal: Positive for arthralgias, back pain and neck pain.   Allergic/Immunologic: Positive for environmental allergies.   Neurological: Negative for headaches.   Psychiatric/Behavioral: Negative for dysphoric mood and sleep disturbance. The patient is not nervous/anxious.        Objective   /80   Pulse 71   Temp 97.5 °F (36.4 °C)   Ht 172.7 cm (68\")   Wt 66.7 kg (147 lb)   SpO2 95%   BMI 22.35 kg/m²   Body mass index is 22.35 kg/m².  Physical Exam  Vitals and nursing note reviewed.   Constitutional:       General: He is not in acute distress.     Appearance: Normal appearance. He is well-developed. He is not ill-appearing.      Comments: Kind and pleasant man, appears stated age and in NAD today   HENT:      Head: Normocephalic and atraumatic.      Right Ear: External ear normal.      Left Ear: External ear normal.   Eyes:      General:         Right eye: No discharge.         Left eye: No discharge.      Extraocular Movements: Extraocular movements intact.   Neck:      Thyroid: No thyromegaly.      Vascular: No carotid bruit.      Comments: No thyromegaly or mass  Cardiovascular:      Rate and Rhythm: Normal rate and regular rhythm.      Pulses: Normal pulses.      Heart sounds: Normal heart sounds. No murmur heard.  Pulmonary:      Effort: Pulmonary effort is normal. No respiratory distress.      Breath sounds: No wheezing.      Comments: Slightly diminished breath sounds throughout all lung field  Abdominal:      General: Bowel sounds are normal. There is no distension.      Palpations: Abdomen is soft.      Tenderness: There is no abdominal tenderness.   Musculoskeletal:         General: Normal range of motion.      Cervical back: Normal range of motion and neck supple.      Right lower leg: No edema.      Left lower leg: " No edema.   Lymphadenopathy:      Cervical: No cervical adenopathy.   Skin:     General: Skin is warm.   Neurological:      General: No focal deficit present.      Mental Status: He is alert and oriented to person, place, and time. Mental status is at baseline.      Cranial Nerves: No cranial nerve deficit.      Motor: No weakness.      Coordination: Coordination normal.      Gait: Gait normal.   Psychiatric:         Mood and Affect: Mood normal.         Behavior: Behavior normal.         Thought Content: Thought content normal.         Judgment: Judgment normal.         Assessment & Plan   Lebron Miller is here today and the following problems have been addressed:      Diagnoses and all orders for this visit:    1. Primary hypertension (Primary)    2. Mixed hyperlipidemia    3. Chronic coronary artery disease    Other orders  -     carvedilol (COREG) 25 MG tablet; Take 1 tablet by mouth 2 (Two) Times a Day With Meals.  Dispense: 180 tablet; Refill: 1  -     clopidogrel (PLAVIX) 75 MG tablet; Take 1 tablet by mouth Daily.  Dispense: 90 tablet; Refill: 1  -     hydroCHLOROthiazide (HYDRODIURIL) 25 MG tablet; Take 1 tablet by mouth Daily.  Dispense: 90 tablet; Refill: 1  -     lisinopril (PRINIVIL,ZESTRIL) 40 MG tablet; Take 1 tablet by mouth Daily.  Dispense: 90 tablet; Refill: 1  -     simvastatin (ZOCOR) 80 MG tablet; Take 1 tablet by mouth every night at bedtime.  Dispense: 90 tablet; Refill: 1        Follow heart healthy/low salt/low-cholesterol diet  Avoid processed foods  Monitor blood pressure on occasion  Exercise as tolerated   Take all medications as prescribed  Continue daily vitamin D  No current depression symptoms  Continue simvastatin for hyperlipidemia  GERD is well controlled with Pepcid  He remains on aspirin and Plavix due to underlying CAD  Continue Symbicort and albuterol as needed due to tobacco abuse  He declines to see a cardiologist despite stents being placed greater than 15 years ago  He  declines CT scan of lungs despite use of tobacco for many years    Return in about 5 months (around 8/14/2023) for Medicare Wellness with Dr Murillo.      Marilyn K. Vermeesch, MD      Part of this note may be an electronic transcription/translation of spoken language to printed text using the Dragon Dictation System.

## 2023-03-15 RX ORDER — ALBUTEROL SULFATE 90 UG/1
2 AEROSOL, METERED RESPIRATORY (INHALATION) EVERY 4 HOURS PRN
Qty: 18 G | Refills: 1 | Status: SHIPPED | OUTPATIENT
Start: 2023-03-15

## 2023-06-02 RX ORDER — CLONIDINE 0.3 MG/24H
PATCH, EXTENDED RELEASE TRANSDERMAL
Qty: 12 EACH | Refills: 1 | Status: SHIPPED | OUTPATIENT
Start: 2023-06-02

## 2023-06-02 NOTE — TELEPHONE ENCOUNTER
Rx Refill Note  Requested Prescriptions     Pending Prescriptions Disp Refills   • cloNIDine (CATAPRES-TTS) 0.3 MG/24HR patch 12 each 1     Sig: Place 1 patch on the skin 1 (One) Time Per Week.      Last office visit with prescribing clinician: 3/14/2023   Last telemedicine visit with prescribing clinician: Visit date not found   Next office visit with prescribing clinician: Visit date not found   Last lab  Last UDS

## 2023-09-25 RX ORDER — CLOPIDOGREL BISULFATE 75 MG/1
75 TABLET ORAL DAILY
Qty: 90 TABLET | Refills: 1 | Status: SHIPPED | OUTPATIENT
Start: 2023-09-25

## 2023-09-25 RX ORDER — CARVEDILOL 25 MG/1
25 TABLET ORAL 2 TIMES DAILY WITH MEALS
Qty: 180 TABLET | Refills: 1 | Status: SHIPPED | OUTPATIENT
Start: 2023-09-25

## 2023-09-25 RX ORDER — LISINOPRIL 40 MG/1
40 TABLET ORAL DAILY
Qty: 90 TABLET | Refills: 1 | Status: SHIPPED | OUTPATIENT
Start: 2023-09-25

## 2023-09-25 RX ORDER — HYDROCHLOROTHIAZIDE 25 MG/1
25 TABLET ORAL DAILY
Qty: 90 TABLET | Refills: 1 | Status: SHIPPED | OUTPATIENT
Start: 2023-09-25

## 2023-09-25 NOTE — TELEPHONE ENCOUNTER
Caller: Lebron Miller    Relationship: Self    Best call back number: [phone]     Requested Prescriptions:   Requested Prescriptions     Pending Prescriptions Disp Refills    lisinopril (PRINIVIL,ZESTRIL) 40 MG tablet 90 tablet 1     Sig: Take 1 tablet by mouth Daily.    hydroCHLOROthiazide (HYDRODIURIL) 25 MG tablet 90 tablet 1     Sig: Take 1 tablet by mouth Daily.    clopidogrel (PLAVIX) 75 MG tablet 90 tablet 1     Sig: Take 1 tablet by mouth Daily.    carvedilol (COREG) 25 MG tablet 180 tablet 1     Sig: Take 1 tablet by mouth 2 (Two) Times a Day With Meals.        Pharmacy where request should be sent: [facility]     Last office visit with prescribing clinician: Visit date not found   Last telemedicine visit with prescribing clinician: Visit date not found   Next office visit with prescribing clinician: 10/16/2023     Additional details provided by patient: OUT OF MEDICATION     Does the patient have less than a 3 day supply:  [x] Yes  [] No    Would you like a call back once the refill request has been completed: [] Yes [x] No    If the office needs to give you a call back, can they leave a voicemail: [] Yes [x] No    Aarti Sanders   09/25/23 10:04 EDT

## 2023-09-25 NOTE — TELEPHONE ENCOUNTER
Rx Refill Note  Requested Prescriptions     Pending Prescriptions Disp Refills    lisinopril (PRINIVIL,ZESTRIL) 40 MG tablet 90 tablet 1     Sig: Take 1 tablet by mouth Daily.    hydroCHLOROthiazide (HYDRODIURIL) 25 MG tablet 90 tablet 1     Sig: Take 1 tablet by mouth Daily.    clopidogrel (PLAVIX) 75 MG tablet 90 tablet 1     Sig: Take 1 tablet by mouth Daily.    carvedilol (COREG) 25 MG tablet 180 tablet 1     Sig: Take 1 tablet by mouth 2 (Two) Times a Day With Meals.      Last office visit with prescribing clinician: Visit date not found   Last telemedicine visit with prescribing clinician: Visit date not found   Next office visit with prescribing clinician: 10/16/2023   Last lab 12/2022

## 2023-11-27 RX ORDER — CLONIDINE 0.3 MG/24H
PATCH, EXTENDED RELEASE TRANSDERMAL
Qty: 12 EACH | Refills: 1 | Status: SHIPPED | OUTPATIENT
Start: 2023-11-27

## 2023-12-07 NOTE — TELEPHONE ENCOUNTER
Med called in   all s/s resolved. no acute events overnight. patient feels well. work up negative. patient has an appointment with his cardiologist on Monday Dec 11, 2023- he will follow up with him for testing and with his neurologist for TIA,  Losartan was increased to 100 mg as patient BP running high.   stable to [place] home all s/s resolved. no acute events overnight. patient feels well. work up negative. patient has an appointment with his cardiologist on Monday Dec 11, 2023- he will follow up with him for testing and with his neurologist for TIA,  Losartan was increased to 100 mg as patient BP running high.   stable to [place] home

## 2024-03-18 RX ORDER — CARVEDILOL 25 MG/1
25 TABLET ORAL 2 TIMES DAILY WITH MEALS
Qty: 180 TABLET | Refills: 1 | Status: SHIPPED | OUTPATIENT
Start: 2024-03-18

## 2024-03-18 NOTE — TELEPHONE ENCOUNTER
Rx Refill Note  Requested Prescriptions     Pending Prescriptions Disp Refills    carvedilol (COREG) 25 MG tablet 180 tablet 1     Sig: Take 1 tablet by mouth 2 (Two) Times a Day With Meals.      Last office visit with prescribing clinician: Visit date not found   Last telemedicine visit with prescribing clinician: Visit date not found   Next office visit with prescribing clinician: Visit date not found     Prachi Henderson MA  03/18/24, 09:12 EDT

## 2024-05-20 RX ORDER — CLONIDINE 0.3 MG/24H
PATCH, EXTENDED RELEASE TRANSDERMAL
Qty: 12 EACH | Refills: 1 | Status: SHIPPED | OUTPATIENT
Start: 2024-05-20

## 2024-05-20 NOTE — TELEPHONE ENCOUNTER
Rx Refill Note  Requested Prescriptions     Pending Prescriptions Disp Refills    cloNIDine (CATAPRES-TTS) 0.3 MG/24HR patch 12 each 1     Sig: Place 1 patch on the skin 1 (One) Time Per Week.      Last office visit with prescribing clinician: Visit date not found   Last telemedicine visit with prescribing clinician: Visit date not found   Next office visit with prescribing clinician: Visit date not found   Prachi Henderson MA  05/20/24, 09:34 EDT

## 2024-06-24 ENCOUNTER — APPOINTMENT (OUTPATIENT)
Dept: GENERAL RADIOLOGY | Facility: HOSPITAL | Age: 69
End: 2024-06-24
Payer: MEDICARE

## 2024-06-24 ENCOUNTER — HOSPITAL ENCOUNTER (EMERGENCY)
Facility: HOSPITAL | Age: 69
Discharge: HOME | End: 2024-06-24
Attending: EMERGENCY MEDICINE | Admitting: EMERGENCY MEDICINE
Payer: MEDICARE

## 2024-06-24 VITALS
SYSTOLIC BLOOD PRESSURE: 160 MMHG | HEIGHT: 68 IN | BODY MASS INDEX: 21.52 KG/M2 | RESPIRATION RATE: 20 BRPM | WEIGHT: 142 LBS | TEMPERATURE: 96.3 F | OXYGEN SATURATION: 93 % | DIASTOLIC BLOOD PRESSURE: 88 MMHG | HEART RATE: 65 BPM

## 2024-06-24 DIAGNOSIS — J44.1 COPD WITH ACUTE EXACERBATION: Primary | ICD-10-CM

## 2024-06-24 LAB
ALBUMIN SERPL-MCNC: 4.4 G/DL (ref 3.5–5.2)
ALBUMIN/GLOB SERPL: 1.4 G/DL
ALP SERPL-CCNC: 64 U/L (ref 39–117)
ALT SERPL W P-5'-P-CCNC: 11 U/L (ref 1–41)
ANION GAP SERPL CALCULATED.3IONS-SCNC: 11.9 MMOL/L (ref 5–15)
AST SERPL-CCNC: 16 U/L (ref 1–40)
BASOPHILS # BLD AUTO: 0.04 10*3/MM3 (ref 0–0.2)
BASOPHILS NFR BLD AUTO: 0.7 % (ref 0–1.5)
BILIRUB SERPL-MCNC: 0.9 MG/DL (ref 0–1.2)
BUN SERPL-MCNC: 13 MG/DL (ref 8–23)
BUN/CREAT SERPL: 17.1 (ref 7–25)
CALCIUM SPEC-SCNC: 9.7 MG/DL (ref 8.6–10.5)
CHLORIDE SERPL-SCNC: 98 MMOL/L (ref 98–107)
CO2 SERPL-SCNC: 29.1 MMOL/L (ref 22–29)
CREAT SERPL-MCNC: 0.76 MG/DL (ref 0.76–1.27)
DEPRECATED RDW RBC AUTO: 50.3 FL (ref 37–54)
EGFRCR SERPLBLD CKD-EPI 2021: 97.3 ML/MIN/1.73
EOSINOPHIL # BLD AUTO: 0.11 10*3/MM3 (ref 0–0.4)
EOSINOPHIL NFR BLD AUTO: 1.8 % (ref 0.3–6.2)
ERYTHROCYTE [DISTWIDTH] IN BLOOD BY AUTOMATED COUNT: 14 % (ref 12.3–15.4)
GLOBULIN UR ELPH-MCNC: 3.1 GM/DL
GLUCOSE SERPL-MCNC: 92 MG/DL (ref 65–99)
HCT VFR BLD AUTO: 47.4 % (ref 37.5–51)
HGB BLD-MCNC: 17 G/DL (ref 13–17.7)
HOLD SPECIMEN: NORMAL
HOLD SPECIMEN: NORMAL
IMM GRANULOCYTES # BLD AUTO: 0.02 10*3/MM3 (ref 0–0.05)
IMM GRANULOCYTES NFR BLD AUTO: 0.3 % (ref 0–0.5)
LYMPHOCYTES # BLD AUTO: 1.62 10*3/MM3 (ref 0.7–3.1)
LYMPHOCYTES NFR BLD AUTO: 26.4 % (ref 19.6–45.3)
MCH RBC QN AUTO: 34.7 PG (ref 26.6–33)
MCHC RBC AUTO-ENTMCNC: 35.9 G/DL (ref 31.5–35.7)
MCV RBC AUTO: 96.7 FL (ref 79–97)
MONOCYTES # BLD AUTO: 0.74 10*3/MM3 (ref 0.1–0.9)
MONOCYTES NFR BLD AUTO: 12.1 % (ref 5–12)
NEUTROPHILS NFR BLD AUTO: 3.61 10*3/MM3 (ref 1.7–7)
NEUTROPHILS NFR BLD AUTO: 58.7 % (ref 42.7–76)
NRBC BLD AUTO-RTO: 0 /100 WBC (ref 0–0.2)
NT-PROBNP SERPL-MCNC: 595.7 PG/ML (ref 0–900)
PLATELET # BLD AUTO: 302 10*3/MM3 (ref 140–450)
PMV BLD AUTO: 8.5 FL (ref 6–12)
POTASSIUM SERPL-SCNC: 3.8 MMOL/L (ref 3.5–5.2)
PROT SERPL-MCNC: 7.5 G/DL (ref 6–8.5)
RBC # BLD AUTO: 4.9 10*6/MM3 (ref 4.14–5.8)
SODIUM SERPL-SCNC: 139 MMOL/L (ref 136–145)
TROPONIN T SERPL HS-MCNC: 17 NG/L
WBC NRBC COR # BLD AUTO: 6.14 10*3/MM3 (ref 3.4–10.8)
WHOLE BLOOD HOLD COAG: NORMAL
WHOLE BLOOD HOLD SPECIMEN: NORMAL

## 2024-06-24 PROCEDURE — 71045 X-RAY EXAM CHEST 1 VIEW: CPT

## 2024-06-24 PROCEDURE — 25010000002 MAGNESIUM SULFATE 2 GM/50ML SOLUTION: Performed by: EMERGENCY MEDICINE

## 2024-06-24 PROCEDURE — 93005 ELECTROCARDIOGRAM TRACING: CPT

## 2024-06-24 PROCEDURE — 80053 COMPREHEN METABOLIC PANEL: CPT

## 2024-06-24 PROCEDURE — 36415 COLL VENOUS BLD VENIPUNCTURE: CPT

## 2024-06-24 PROCEDURE — 99284 EMERGENCY DEPT VISIT MOD MDM: CPT

## 2024-06-24 PROCEDURE — 96365 THER/PROPH/DIAG IV INF INIT: CPT

## 2024-06-24 PROCEDURE — 94640 AIRWAY INHALATION TREATMENT: CPT

## 2024-06-24 PROCEDURE — 84484 ASSAY OF TROPONIN QUANT: CPT

## 2024-06-24 PROCEDURE — 96375 TX/PRO/DX INJ NEW DRUG ADDON: CPT

## 2024-06-24 PROCEDURE — 25010000002 LABETALOL 5 MG/ML SOLUTION: Performed by: EMERGENCY MEDICINE

## 2024-06-24 PROCEDURE — 25010000002 METHYLPREDNISOLONE PER 125 MG: Performed by: EMERGENCY MEDICINE

## 2024-06-24 PROCEDURE — 85025 COMPLETE CBC W/AUTO DIFF WBC: CPT

## 2024-06-24 PROCEDURE — 83880 ASSAY OF NATRIURETIC PEPTIDE: CPT

## 2024-06-24 RX ORDER — LABETALOL HYDROCHLORIDE 5 MG/ML
10 INJECTION, SOLUTION INTRAVENOUS ONCE
Status: COMPLETED | OUTPATIENT
Start: 2024-06-24 | End: 2024-06-24

## 2024-06-24 RX ORDER — DOXYCYCLINE 100 MG/1
100 CAPSULE ORAL 2 TIMES DAILY
Qty: 10 CAPSULE | Refills: 0 | Status: SHIPPED | OUTPATIENT
Start: 2024-06-24 | End: 2024-06-30

## 2024-06-24 RX ORDER — MAGNESIUM SULFATE HEPTAHYDRATE 40 MG/ML
2 INJECTION, SOLUTION INTRAVENOUS ONCE
Status: COMPLETED | OUTPATIENT
Start: 2024-06-24 | End: 2024-06-24

## 2024-06-24 RX ORDER — IPRATROPIUM BROMIDE AND ALBUTEROL SULFATE 2.5; .5 MG/3ML; MG/3ML
3 SOLUTION RESPIRATORY (INHALATION) ONCE
Status: COMPLETED | OUTPATIENT
Start: 2024-06-24 | End: 2024-06-24

## 2024-06-24 RX ORDER — PREDNISONE 20 MG/1
40 TABLET ORAL DAILY
Qty: 10 TABLET | Refills: 0 | Status: SHIPPED | OUTPATIENT
Start: 2024-06-24 | End: 2024-06-30

## 2024-06-24 RX ORDER — SODIUM CHLORIDE 0.9 % (FLUSH) 0.9 %
10 SYRINGE (ML) INJECTION AS NEEDED
Status: DISCONTINUED | OUTPATIENT
Start: 2024-06-24 | End: 2024-06-25 | Stop reason: HOSPADM

## 2024-06-24 RX ORDER — METHYLPREDNISOLONE SODIUM SUCCINATE 125 MG/2ML
125 INJECTION, POWDER, LYOPHILIZED, FOR SOLUTION INTRAMUSCULAR; INTRAVENOUS ONCE
Status: COMPLETED | OUTPATIENT
Start: 2024-06-24 | End: 2024-06-24

## 2024-06-24 RX ORDER — ALBUTEROL SULFATE 2.5 MG/3ML
2.5 SOLUTION RESPIRATORY (INHALATION)
Status: DISPENSED | OUTPATIENT
Start: 2024-06-24 | End: 2024-06-24

## 2024-06-24 RX ADMIN — MAGNESIUM SULFATE HEPTAHYDRATE 2 G: 40 INJECTION, SOLUTION INTRAVENOUS at 18:44

## 2024-06-24 RX ADMIN — Medication 10 MG: at 19:55

## 2024-06-24 RX ADMIN — IPRATROPIUM BROMIDE AND ALBUTEROL SULFATE 3 ML: .5; 3 SOLUTION RESPIRATORY (INHALATION) at 18:53

## 2024-06-24 RX ADMIN — METHYLPREDNISOLONE SODIUM SUCCINATE 125 MG: 125 INJECTION, POWDER, FOR SOLUTION INTRAMUSCULAR; INTRAVENOUS at 18:44

## 2024-06-24 RX ADMIN — ALBUTEROL SULFATE 2.5 MG: 2.5 SOLUTION RESPIRATORY (INHALATION) at 18:53

## 2024-06-25 NOTE — ED PROVIDER NOTES
EMERGENCY DEPARTMENT ENCOUNTER    Pt Name: Lebron Miller  MRN: [id number]  Pt :   1955  Room Number:    Date of encounter:  2024  PCP: Natasha Veronica MD  ED Provider: Rodrigo Lyons MD    Historian: Patient      HPI:  Chief Complaint   Patient presents with    Shortness of Breath          Context: Lebron Miller is a 69 y.o. male who presents to the ED c/o shortness of breath, the patient reports he was mowing the lawn on his mower, became acutely short of breath, feels like he inhaled a lot of grass clipping, as well as dust and dirt.  The patient reports cough, denies fevers, feels like this is his his COPD, no relief from inhalers.      PAST MEDICAL HISTORY  Past Medical History:   Diagnosis Date    Allergic rhinitis     Arthritis     Hypertension     Myocardial infarction     Psoriasis     Skin cancer          PAST SURGICAL HISTORY  Past Surgical History:   Procedure Laterality Date    CORONARY STENT PLACEMENT           FAMILY HISTORY  Family History   Problem Relation Age of Onset    COPD Mother     Stroke Father     Aneurysm Brother     Stroke Brother          SOCIAL HISTORY  Social History     Socioeconomic History    Marital status:    Tobacco Use    Smoking status: Every Day     Current packs/day: 1.00     Average packs/day: 1 pack/day for 56.5 years (56.5 ttl pk-yrs)     Types: Cigarettes     Start date:     Smokeless tobacco: Never   Substance and Sexual Activity    Alcohol use: No    Drug use: No    Sexual activity: Defer         ALLERGIES  Patient has no known allergies.        REVIEW OF SYSTEMS  Review of Systems   Constitutional:  Negative for chills and fever.   HENT:  Negative for sore throat and trouble swallowing.    Eyes:  Negative for pain and redness.   Respiratory:  Positive for cough, shortness of breath and wheezing.    Cardiovascular:  Negative for chest pain and leg swelling.   Gastrointestinal:  Negative for abdominal pain, nausea and  vomiting.   Genitourinary:  Negative for dysuria and urgency.   Musculoskeletal:  Negative for back pain and neck pain.   Skin:  Negative for rash and wound.   Neurological:  Negative for dizziness and weakness.        All systems reviewed and negative except for those discussed in HPI.       PHYSICAL EXAM    I have reviewed the triage vital signs and nursing notes.    ED Triage Vitals   Temp Heart Rate Resp BP SpO2   06/24/24 1839 06/24/24 1830 06/24/24 1830 06/24/24 1832 06/24/24 1828   96.3 °F (35.7 °C) 79 22 (!) 151/137 91 %      Temp src Heart Rate Source Patient Position BP Location FiO2 (%)   06/24/24 1839 06/24/24 1830 06/24/24 1841 06/24/24 1841 --   Axillary Monitor Sitting Left arm        Physical Exam  Constitutional:       Appearance: Normal appearance. He is not ill-appearing.   HENT:      Head: Normocephalic and atraumatic.      Right Ear: External ear normal.      Left Ear: External ear normal.      Nose: Nose normal.      Mouth/Throat:      Mouth: Mucous membranes are moist.      Pharynx: Oropharynx is clear.   Eyes:      Extraocular Movements: Extraocular movements intact.      Conjunctiva/sclera: Conjunctivae normal.      Pupils: Pupils are equal, round, and reactive to light.   Cardiovascular:      Rate and Rhythm: Normal rate and regular rhythm.      Pulses:           Radial pulses are 2+ on the right side and 2+ on the left side.      Heart sounds: No murmur heard.  Pulmonary:      Effort: Tachypnea and accessory muscle usage present.      Breath sounds: Examination of the right-upper field reveals decreased breath sounds and wheezing. Examination of the left-upper field reveals decreased breath sounds and wheezing. Examination of the right-middle field reveals decreased breath sounds and wheezing. Examination of the left-middle field reveals decreased breath sounds and wheezing. Examination of the right-lower field reveals decreased breath sounds and wheezing. Examination of the left-lower  field reveals decreased breath sounds and wheezing. Decreased breath sounds and wheezing present.   Abdominal:      General: There is no distension.      Tenderness: There is no abdominal tenderness. There is no guarding.   Musculoskeletal:         General: No swelling or deformity.      Cervical back: Normal range of motion and neck supple.   Skin:     General: Skin is warm and dry.      Capillary Refill: Capillary refill takes less than 2 seconds.      Findings: No rash.   Neurological:      General: No focal deficit present.      Mental Status: He is alert and oriented to person, place, and time.            LAB RESULTS  Recent Results (from the past 24 hour(s))   Comprehensive Metabolic Panel    Collection Time: 06/24/24  6:34 PM    Specimen: Blood   Result Value Ref Range    Glucose 92 65 - 99 mg/dL    BUN 13 8 - 23 mg/dL    Creatinine 0.76 0.76 - 1.27 mg/dL    Sodium 139 136 - 145 mmol/L    Potassium 3.8 3.5 - 5.2 mmol/L    Chloride 98 98 - 107 mmol/L    CO2 29.1 (H) 22.0 - 29.0 mmol/L    Calcium 9.7 8.6 - 10.5 mg/dL    Total Protein 7.5 6.0 - 8.5 g/dL    Albumin 4.4 3.5 - 5.2 g/dL    ALT (SGPT) 11 1 - 41 U/L    AST (SGOT) 16 1 - 40 U/L    Alkaline Phosphatase 64 39 - 117 U/L    Total Bilirubin 0.9 0.0 - 1.2 mg/dL    Globulin 3.1 gm/dL    A/G Ratio 1.4 g/dL    BUN/Creatinine Ratio 17.1 7.0 - 25.0    Anion Gap 11.9 5.0 - 15.0 mmol/L    eGFR 97.3 >60.0 mL/min/1.73   BNP    Collection Time: 06/24/24  6:34 PM    Specimen: Blood   Result Value Ref Range    proBNP 595.7 0.0 - 900.0 pg/mL   Single High Sensitivity Troponin T    Collection Time: 06/24/24  6:34 PM    Specimen: Blood   Result Value Ref Range    HS Troponin T 17 <22 ng/L   Green Top (Gel)    Collection Time: 06/24/24  6:34 PM   Result Value Ref Range    Extra Tube Hold for add-ons.    Lavender Top    Collection Time: 06/24/24  6:34 PM   Result Value Ref Range    Extra Tube hold for add-on    Gold Top - SST    Collection Time: 06/24/24  6:34 PM   Result  Value Ref Range    Extra Tube Hold for add-ons.    Light Blue Top    Collection Time: 06/24/24  6:34 PM   Result Value Ref Range    Extra Tube Hold for add-ons.    CBC Auto Differential    Collection Time: 06/24/24  6:34 PM    Specimen: Blood   Result Value Ref Range    WBC 6.14 3.40 - 10.80 10*3/mm3    RBC 4.90 4.14 - 5.80 10*6/mm3    Hemoglobin 17.0 13.0 - 17.7 g/dL    Hematocrit 47.4 37.5 - 51.0 %    MCV 96.7 79.0 - 97.0 fL    MCH 34.7 (H) 26.6 - 33.0 pg    MCHC 35.9 (H) 31.5 - 35.7 g/dL    RDW 14.0 12.3 - 15.4 %    RDW-SD 50.3 37.0 - 54.0 fl    MPV 8.5 6.0 - 12.0 fL    Platelets 302 140 - 450 10*3/mm3    Neutrophil % 58.7 42.7 - 76.0 %    Lymphocyte % 26.4 19.6 - 45.3 %    Monocyte % 12.1 (H) 5.0 - 12.0 %    Eosinophil % 1.8 0.3 - 6.2 %    Basophil % 0.7 0.0 - 1.5 %    Immature Grans % 0.3 0.0 - 0.5 %    Neutrophils, Absolute 3.61 1.70 - 7.00 10*3/mm3    Lymphocytes, Absolute 1.62 0.70 - 3.10 10*3/mm3    Monocytes, Absolute 0.74 0.10 - 0.90 10*3/mm3    Eosinophils, Absolute 0.11 0.00 - 0.40 10*3/mm3    Basophils, Absolute 0.04 0.00 - 0.20 10*3/mm3    Immature Grans, Absolute 0.02 0.00 - 0.05 10*3/mm3    nRBC 0.0 0.0 - 0.2 /100 WBC       If labs were ordered, I independently reviewed the results and considered them in treating the patient.        RADIOLOGY  No Radiology Exams Resulted Within Past 24 Hours        PROCEDURES    Procedures    Interpretations    O2 Sat: The patients oxygen saturation was 93% on Room Air.  This was independently interpreted by me as Normal    EKG: I reviewed and independently interpreted the EKG as sinus rhythm at 76 bpm, elevated QRS duration right bundle branch block, normal axis, normal intervals, no ST elevation    Cardiac Monitoring: I reviewed and independently interpreted the Rhythm Strip as Normal Sinus rhythm rate of 65    Radiology: I ordered and independently reviewed the above noted radiographic studies.  I viewed images of Chest Xray which showed  hyperinflated lungs, no  acute pneumonia  per my independent interpretation. See radiologist's dictation for official interpretation.         MEDICATIONS GIVEN IN ER    Medications   sodium chloride 0.9 % flush 10 mL (has no administration in time range)   albuterol (PROVENTIL) nebulizer solution 0.083% 2.5 mg/3mL ( Nebulization Canceled Entry 6/24/24 1949)   methylPREDNISolone sodium succinate (SOLU-Medrol) injection 125 mg (125 mg Intravenous Given 6/24/24 1844)   magnesium sulfate 2g/50 mL (PREMIX) infusion (0 g Intravenous Stopped 6/24/24 1937)   ipratropium-albuterol (DUO-NEB) nebulizer solution 3 mL (3 mL Nebulization Given 6/24/24 1853)   labetalol (NORMODYNE,TRANDATE) injection 10 mg (10 mg Intravenous Given 6/1955)         MEDICAL DECISION MAKING, PROGRESS, and CONSULTS    All labs, if obtained, have been independently reviewed by me.  All radiology studies, if obtained, have been reviewed by me and the radiologist dictating the report.  All EKG's, if obtained, have been independently viewed and interpreted by me      Discussion below represents my analysis of pertinent findings related to patient's condition, differential diagnosis, treatment plan and final disposition.      Differential diagnosis:    69-year-old male presented ED with acute shortness of breath, does have a history of COPD, he has decreased breath sounds and wheezes diffusely, think this was likely set off by dirt and dust from vacuuming, the patient is having some difficulty breathing but he is awake and alert not severely hypoxic, will initiate nebulized breathing treatments, steroids, magnesium, obtain labs including troponin and BNP to rule out acute coronary syndrome or acute heart failure.    Additional Sources:  External (non-ED) record review:  Primary care note 14 2024 regarding his chronic conditions including his COPD and his tobacco use       Orders placed during this visit:  Orders Placed This Encounter   Procedures    XR Chest 1 View    [place]  Draw    Comprehensive Metabolic Panel    BNP    Single High Sensitivity Troponin T    CBC Auto Differential    Blood Gas, Venous -With Co-Ox Panel: Yes    NPO Diet NPO Type: Strict NPO    Undress & Gown    Continuous Pulse Oximetry    Vital Signs    Oxygen Therapy- Nasal Cannula; Titrate 1-6 LPM Per SpO2; 90 - 95%    ECG 12 Lead ED Triage Standing Order; SOA    Insert Peripheral IV    CBC & Differential    Green Top (Gel)    Lavender Top    Gold Top - SST    Light Blue Top         Additional orders considered but not ordered:  None    ED Course:    Consultants:  None    ED Course as of 06/24/24 2212 Mon Jun 24, 2024 1911 Single High Sensitivity Troponin T  Troponin negative [CS]   1911 proBNP: 595.7  BNP not significantly elevated [CS]   2154 XR Chest 1 View  X-ray negative for acute pulmonary process [CS]   2155 Patient's breathing much better, on room air he is 94%, his wheezing has improved, he states he feels much closer to his baseline, I think this likely was a COPD exacerbation set off by mowing the lawn, will prescribe steroids, antibiotic, [CS]   2155  Combivent inhaler.  Vies patient follow-up with primary care doctor.  He voiced understanding is agreeable to plan for discharge home [CS]      ED Course User Index  [CS] Rodrigo Lyons MD           After my consideration of clinical presentation and any laboratory/radiology studies obtained, I discussed the findings with the patient/patient representative who is in agreement with the treatment plan and the final disposition. Risks and benefits of discharge were discussed.     AS OF 22:12 EDT VITALS:    BP - 160/88  HR - 65  TEMP - 96.3 °F (35.7 °C) (Axillary)  O2 SATS - 93%    I reviewed the patients prescription monitoring report if available prior to discharge    DIAGNOSIS  Final diagnoses:   COPD with acute exacerbation         DISPOSITION  ED Disposition       ED Disposition   Discharge    Condition   Stable    Comment   --                    Please note that portions of this document were completed with voice recognition software.        Rodrigo Lyons MD  06/24/24 2207

## 2024-11-19 RX ORDER — CLONIDINE 0.3 MG/24H
PATCH, EXTENDED RELEASE TRANSDERMAL
Qty: 12 EACH | Refills: 1 | OUTPATIENT
Start: 2024-11-19

## 2025-05-19 ENCOUNTER — APPOINTMENT (OUTPATIENT)
Dept: CT IMAGING | Facility: HOSPITAL | Age: 70
End: 2025-05-19
Payer: MEDICARE

## 2025-05-19 ENCOUNTER — HOSPITAL ENCOUNTER (INPATIENT)
Facility: HOSPITAL | Age: 70
LOS: 2 days | Discharge: HOME HEALTH | End: 2025-05-22
Attending: EMERGENCY MEDICINE | Admitting: INTERNAL MEDICINE
Payer: MEDICARE

## 2025-05-19 ENCOUNTER — APPOINTMENT (OUTPATIENT)
Dept: GENERAL RADIOLOGY | Facility: HOSPITAL | Age: 70
End: 2025-05-19
Payer: MEDICARE

## 2025-05-19 DIAGNOSIS — J44.1 CHRONIC OBSTRUCTIVE PULMONARY DISEASE WITH ACUTE EXACERBATION: ICD-10-CM

## 2025-05-19 DIAGNOSIS — R53.81 DEBILITY: ICD-10-CM

## 2025-05-19 DIAGNOSIS — N40.0 ENLARGED PROSTATE: ICD-10-CM

## 2025-05-19 DIAGNOSIS — J96.01 ACUTE RESPIRATORY FAILURE WITH HYPOXIA: ICD-10-CM

## 2025-05-19 DIAGNOSIS — J44.1 ACUTE EXACERBATION OF CHRONIC OBSTRUCTIVE PULMONARY DISEASE (COPD): Primary | ICD-10-CM

## 2025-05-19 LAB
ALBUMIN SERPL-MCNC: 3.8 G/DL (ref 3.5–5.2)
ALBUMIN/GLOB SERPL: 1.3 G/DL
ALP SERPL-CCNC: 87 U/L (ref 39–117)
ALT SERPL W P-5'-P-CCNC: 9 U/L (ref 1–41)
ANION GAP SERPL CALCULATED.3IONS-SCNC: 11.6 MMOL/L (ref 5–15)
AST SERPL-CCNC: 14 U/L (ref 1–40)
BASOPHILS # BLD AUTO: 0.03 10*3/MM3 (ref 0–0.2)
BASOPHILS NFR BLD AUTO: 0.4 % (ref 0–1.5)
BILIRUB SERPL-MCNC: 0.7 MG/DL (ref 0–1.2)
BILIRUB UR QL STRIP: NEGATIVE
BUN SERPL-MCNC: 15 MG/DL (ref 8–23)
BUN/CREAT SERPL: 19 (ref 7–25)
CALCIUM SPEC-SCNC: 9.3 MG/DL (ref 8.6–10.5)
CHLORIDE SERPL-SCNC: 98 MMOL/L (ref 98–107)
CLARITY UR: CLEAR
CO2 SERPL-SCNC: 27.4 MMOL/L (ref 22–29)
COLOR UR: YELLOW
CREAT SERPL-MCNC: 0.79 MG/DL (ref 0.76–1.27)
DEPRECATED RDW RBC AUTO: 43.6 FL (ref 37–54)
EGFRCR SERPLBLD CKD-EPI 2021: 95.6 ML/MIN/1.73
EOSINOPHIL # BLD AUTO: 0.16 10*3/MM3 (ref 0–0.4)
EOSINOPHIL NFR BLD AUTO: 2.2 % (ref 0.3–6.2)
ERYTHROCYTE [DISTWIDTH] IN BLOOD BY AUTOMATED COUNT: 12.4 % (ref 12.3–15.4)
GEN 5 1HR TROPONIN T REFLEX: 13 NG/L
GLOBULIN UR ELPH-MCNC: 3 GM/DL
GLUCOSE SERPL-MCNC: 89 MG/DL (ref 65–99)
GLUCOSE UR STRIP-MCNC: NEGATIVE MG/DL
HCT VFR BLD AUTO: 44.7 % (ref 37.5–51)
HGB BLD-MCNC: 15.8 G/DL (ref 13–17.7)
HGB UR QL STRIP.AUTO: NEGATIVE
HOLD SPECIMEN: NORMAL
HOLD SPECIMEN: NORMAL
IMM GRANULOCYTES # BLD AUTO: 0.02 10*3/MM3 (ref 0–0.05)
IMM GRANULOCYTES NFR BLD AUTO: 0.3 % (ref 0–0.5)
KETONES UR QL STRIP: NEGATIVE
LEUKOCYTE ESTERASE UR QL STRIP.AUTO: NEGATIVE
LYMPHOCYTES # BLD AUTO: 1.45 10*3/MM3 (ref 0.7–3.1)
LYMPHOCYTES NFR BLD AUTO: 20.4 % (ref 19.6–45.3)
MCH RBC QN AUTO: 33.8 PG (ref 26.6–33)
MCHC RBC AUTO-ENTMCNC: 35.3 G/DL (ref 31.5–35.7)
MCV RBC AUTO: 95.5 FL (ref 79–97)
MONOCYTES # BLD AUTO: 0.73 10*3/MM3 (ref 0.1–0.9)
MONOCYTES NFR BLD AUTO: 10.3 % (ref 5–12)
NEUTROPHILS NFR BLD AUTO: 4.73 10*3/MM3 (ref 1.7–7)
NEUTROPHILS NFR BLD AUTO: 66.4 % (ref 42.7–76)
NITRITE UR QL STRIP: NEGATIVE
NRBC BLD AUTO-RTO: 0 /100 WBC (ref 0–0.2)
NT-PROBNP SERPL-MCNC: 421.4 PG/ML (ref 0–900)
PH UR STRIP.AUTO: 7 [PH] (ref 5–8)
PLATELET # BLD AUTO: 282 10*3/MM3 (ref 140–450)
PMV BLD AUTO: 8.4 FL (ref 6–12)
POTASSIUM SERPL-SCNC: 4.1 MMOL/L (ref 3.5–5.2)
PROT SERPL-MCNC: 6.8 G/DL (ref 6–8.5)
PROT UR QL STRIP: NEGATIVE
RBC # BLD AUTO: 4.68 10*6/MM3 (ref 4.14–5.8)
SODIUM SERPL-SCNC: 137 MMOL/L (ref 136–145)
SP GR UR STRIP: >=1.03 (ref 1–1.03)
TROPONIN T NUMERIC DELTA: -1 NG/L
TROPONIN T SERPL HS-MCNC: 14 NG/L
UROBILINOGEN UR QL STRIP: NORMAL
WBC NRBC COR # BLD AUTO: 7.12 10*3/MM3 (ref 3.4–10.8)
WHOLE BLOOD HOLD COAG: NORMAL
WHOLE BLOOD HOLD SPECIMEN: NORMAL

## 2025-05-19 PROCEDURE — 36415 COLL VENOUS BLD VENIPUNCTURE: CPT

## 2025-05-19 PROCEDURE — 80053 COMPREHEN METABOLIC PANEL: CPT | Performed by: EMERGENCY MEDICINE

## 2025-05-19 PROCEDURE — 74177 CT ABD & PELVIS W/CONTRAST: CPT

## 2025-05-19 PROCEDURE — 99285 EMERGENCY DEPT VISIT HI MDM: CPT | Performed by: EMERGENCY MEDICINE

## 2025-05-19 PROCEDURE — 83880 ASSAY OF NATRIURETIC PEPTIDE: CPT | Performed by: EMERGENCY MEDICINE

## 2025-05-19 PROCEDURE — 25510000001 IOPAMIDOL 61 % SOLUTION: Performed by: EMERGENCY MEDICINE

## 2025-05-19 PROCEDURE — 94799 UNLISTED PULMONARY SVC/PX: CPT

## 2025-05-19 PROCEDURE — 25010000002 METHYLPREDNISOLONE PER 125 MG: Performed by: EMERGENCY MEDICINE

## 2025-05-19 PROCEDURE — 71045 X-RAY EXAM CHEST 1 VIEW: CPT

## 2025-05-19 PROCEDURE — 71275 CT ANGIOGRAPHY CHEST: CPT

## 2025-05-19 PROCEDURE — 94640 AIRWAY INHALATION TREATMENT: CPT

## 2025-05-19 PROCEDURE — 25510000002 DIATRIZOATE MEGLUMINE & SODIUM PER 1 ML: Performed by: EMERGENCY MEDICINE

## 2025-05-19 PROCEDURE — 93005 ELECTROCARDIOGRAM TRACING: CPT | Performed by: EMERGENCY MEDICINE

## 2025-05-19 PROCEDURE — 85025 COMPLETE CBC W/AUTO DIFF WBC: CPT | Performed by: EMERGENCY MEDICINE

## 2025-05-19 PROCEDURE — 84484 ASSAY OF TROPONIN QUANT: CPT | Performed by: EMERGENCY MEDICINE

## 2025-05-19 PROCEDURE — 81001 URINALYSIS AUTO W/SCOPE: CPT | Performed by: EMERGENCY MEDICINE

## 2025-05-19 RX ORDER — METHYLPREDNISOLONE SODIUM SUCCINATE 125 MG/2ML
125 INJECTION, POWDER, LYOPHILIZED, FOR SOLUTION INTRAMUSCULAR; INTRAVENOUS ONCE
Status: COMPLETED | OUTPATIENT
Start: 2025-05-19 | End: 2025-05-19

## 2025-05-19 RX ORDER — IPRATROPIUM BROMIDE AND ALBUTEROL SULFATE 2.5; .5 MG/3ML; MG/3ML
3 SOLUTION RESPIRATORY (INHALATION) ONCE
Status: COMPLETED | OUTPATIENT
Start: 2025-05-19 | End: 2025-05-19

## 2025-05-19 RX ORDER — DIATRIZOATE MEGLUMINE AND DIATRIZOATE SODIUM 660; 100 MG/ML; MG/ML
30 SOLUTION ORAL; RECTAL
Status: COMPLETED | OUTPATIENT
Start: 2025-05-19 | End: 2025-05-19

## 2025-05-19 RX ORDER — IOPAMIDOL 612 MG/ML
100 INJECTION, SOLUTION INTRAVASCULAR
Status: COMPLETED | OUTPATIENT
Start: 2025-05-19 | End: 2025-05-19

## 2025-05-19 RX ORDER — SODIUM CHLORIDE 0.9 % (FLUSH) 0.9 %
10 SYRINGE (ML) INJECTION AS NEEDED
Status: DISCONTINUED | OUTPATIENT
Start: 2025-05-19 | End: 2025-05-22 | Stop reason: HOSPADM

## 2025-05-19 RX ADMIN — IOPAMIDOL 100 ML: 612 INJECTION, SOLUTION INTRAVENOUS at 21:13

## 2025-05-19 RX ADMIN — METHYLPREDNISOLONE SODIUM SUCCINATE 125 MG: 125 INJECTION INTRAMUSCULAR; INTRAVENOUS at 18:35

## 2025-05-19 RX ADMIN — DIATRIZOATE MEGLUMINE AND DIATRIZOATE SODIUM 30 ML: 660; 100 LIQUID ORAL; RECTAL at 21:13

## 2025-05-19 RX ADMIN — IPRATROPIUM BROMIDE AND ALBUTEROL SULFATE 3 ML: 2.5; .5 SOLUTION RESPIRATORY (INHALATION) at 20:09

## 2025-05-19 RX ADMIN — IPRATROPIUM BROMIDE AND ALBUTEROL SULFATE 3 ML: 2.5; .5 SOLUTION RESPIRATORY (INHALATION) at 21:53

## 2025-05-19 NOTE — ED PROVIDER NOTES
[facility] 4  Emergency Department Encounter  Emergency Medicine Physician Note     Pt Name:Lebron Miller  MRN: [id number]  Birthdate 1955  Date of evaluation: 5/19/2025  PCP:  Natasha Veronica MD  Note Started: 5:59 PM EDT      CHIEF COMPLAINT       Chief Complaint   Patient presents with    Shortness of Breath       HISTORY OF PRESENT ILLNESS  (Location/Symptom, Timing/Onset, Context/Setting, Quality, Duration, Modifying Factors, Severity.)      Lebron Miller is a 70 y.o. male who presents with shortness of breath.  Patient describes dyspnea has been going on for multiple days.  Patient does describe a smoking history.  Patient with also cardiac history.  Patient's been using inhalers with no improvement.  Patient also describes abdominal pain, states that he gets a fullness suddenly with eating small amount of fluid.    PAST MEDICAL / SURGICAL / SOCIAL / FAMILY HISTORY     Past Medical History:   Diagnosis Date    Allergic rhinitis     Arthritis     Hypertension     Myocardial infarction     Psoriasis     Skin cancer      No additional pertinent       Past Surgical History:   Procedure Laterality Date    CORONARY STENT PLACEMENT       No additional pertinent       Social History     Socioeconomic History    Marital status:    Tobacco Use    Smoking status: Every Day     Current packs/day: 1.00     Average packs/day: 1 pack/day for 57.4 years (57.4 ttl pk-yrs)     Types: Cigarettes     Start date: 1968    Smokeless tobacco: Never   Vaping Use    Vaping status: Never Used   Substance and Sexual Activity    Alcohol use: No    Drug use: No    Sexual activity: Defer       Family History   Problem Relation Age of Onset    COPD Mother     Stroke Father     Aneurysm Brother     Stroke Brother        Allergies:  Patient has no known allergies.    Home Medications:  Prior to Admission medications    Medication Sig Start Date End Date Taking? Authorizing Provider    albuterol (PROVENTIL) (2.5 MG/3ML) 0.083% nebulizer solution Use one vial by nebulization every 6 (six) hours as needed for Wheezing 4/18/24      albuterol (PROVENTIL) (2.5 MG/3ML) 0.083% nebulizer solution Inhale contents of 1 vial through a nebulizer every 6 (six) hours as needed for wheezing 7/18/24      albuterol (PROVENTIL) (2.5 MG/3ML) 0.083% nebulizer solution Inhale contents of 1 vial through a nebulizer every 6 (six) hours as needed for wheezing 10/22/24      albuterol (PROVENTIL) (2.5 MG/3ML) 0.083% nebulizer solution Inhale contents of 1 vial through a nebulizer Every 6 (Six) Hours As Needed for wheezing 2/13/25      albuterol sulfate  (90 Base) MCG/ACT inhaler Inhale 2 puffs by mouth Every 4 (Four) Hours As Needed for Wheezing or Shortness of Air. 3/15/23   Vermeesch, Marilyn K, MD   albuterol sulfate  (90 Base) MCG/ACT inhaler Inhale 2 puffs by mouth Every 4 (Four) Hours As Needed for Wheezing or Shortness of Air. 5/7/24      albuterol sulfate  (90 Base) MCG/ACT inhaler Inhale 2 puffs Every 4 (Four) Hours As Needed for wheezing 7/18/24      albuterol sulfate  (90 Base) MCG/ACT inhaler Inhale 2 puffs by mouth Every 4 (Four) Hours As Needed for Wheezing or Shortness of Air. 8/16/24      albuterol sulfate  (90 Base) MCG/ACT inhaler Inhale 2 puffs by mouth Every 4 (Four) Hours As Needed for wheezing. 10/22/24      albuterol sulfate  (90 Base) MCG/ACT inhaler Inhale 2 puffs Every 4 (Four) Hours As Needed for wheezing. 2/13/25      aspirin 81 MG EC tablet Take 1 tablet by mouth Daily. 7/18/24      aspirin 81 MG EC tablet Take 1 tablet by mouth Daily. 10/22/24      aspirin 81 MG tablet Take  by mouth daily. 3/14/13   Provider, MD Roshan   Budeson-Glycopyrrol-Formoterol (Breztri Aerosphere) 160-9-4.8 MCG/ACT aerosol inhaler Inhale 2 puffs by mouth 2 (Two) Times a Day. 5/7/24      Budeson-Glycopyrrol-Formoterol (Breztri Aerosphere) 160-9-4.8 MCG/ACT aerosol  inhaler Inhale 2 puffs by mouth  2 (Two) Times a Day. Rinse mouth after use 7/18/24      budesonide-formoterol (Symbicort) 80-4.5 MCG/ACT inhaler Inhale 2 puffs by mouth twice daily 3/29/22   Vermeesch, Marilyn K, MD   carvedilol (COREG) 25 MG tablet Take 1 tablet by mouth 2 (Two) Times a Day. 2/13/25      Cholecalciferol (VITAMIN D) 2000 UNITS tablet Take  by mouth. 3/14/13   Provider, MD Roshan   cloNIDine (CATAPRES-TTS) 0.3 MG/24HR patch Place 1 patch on the skin 1 (One) Time Per Week. 5/20/24   Alie Macias DO   cloNIDine (CATAPRES-TTS) 0.3 MG/24HR patch Place 1 patch on the skin as directed by provider 1 (One) Time Per Week. 2/20/25      clopidogrel (PLAVIX) 75 MG tablet Take 1 tablet by mouth Daily. 10/22/24      clopidogrel (PLAVIX) 75 MG tablet Take 1 tablet by mouth Daily. 2/13/25      Coenzyme Q10 (CoQ10) 100 MG capsule Take 1 capsule (100 mg total) by mouth daily for 90 days. 7/18/24      famotidine (Heartburn Relief) 10 MG tablet Take 2 tablets by mouth Daily. 10/25/22   Vermeesch, Marilyn K, MD   hydroCHLOROthiazide 25 MG tablet Take 1 tablet by mouth Daily. 10/22/24      hydroCHLOROthiazide 25 MG tablet Take 1 tablet by mouth Daily. 2/13/25      Influenza Vac High-Dose Quad (Fluzone High-Dose Quadrivalent) 0.7 ML suspension prefilled syringe injection Inject 0.7 mL into the appropriate muscle as directed by prescriber. 10/4/23   Zaida Johansen MD   ipratropium-albuterol (COMBIVENT RESPIMAT)  MCG/ACT inhaler Inhale 1 puff 4 (Four) Times a Day As Needed for Wheezing. 6/24/24   Rodrigo Lyons MD   lisinopril (PRINIVIL,ZESTRIL) 40 MG tablet Take 1 tablet by mouth Daily. 10/22/24   Gualberto Luna APRN   lisinopril (PRINIVIL,ZESTRIL) 40 MG tablet Take 1 tablet by mouth Daily. 2/13/25      magnesium, as, gluconate (MAGONATE) 500 (27 Mg) MG tablet Take 1 tablet by mouth Daily. 7/18/24   Gualberto Luna APRN   simvastatin (ZOCOR) 80 MG tablet Take 1 tablet by mouth  "every night at bedtime. 3/14/23   Vermeesch, Marilyn K, MD   simvastatin (ZOCOR) 80 MG tablet Take 1 tablet by mouth Every Night. 7/18/24      simvastatin (ZOCOR) 80 MG tablet Take 1 tablet by mouth Every Night. 10/22/24      simvastatin (ZOCOR) 80 MG tablet Take 1 tablet by mouth Every Night. 2/13/25            REVIEW OF SYSTEMS       Review of Systems   Constitutional:  Negative for chills and fever.   Respiratory:  Positive for cough, chest tightness and shortness of breath.    Cardiovascular:  Positive for chest pain.   Gastrointestinal:  Positive for nausea. Negative for abdominal pain, diarrhea and vomiting.   Genitourinary:  Negative for flank pain.   Neurological:  Negative for dizziness and light-headedness.       PHYSICAL EXAM      INITIAL VITALS:   /91 (BP Location: Left arm, Patient Position: Lying)   Pulse 66   Temp 97.4 °F (36.3 °C) (Oral)   Resp 18   Ht 172.7 cm (68\")   Wt 59.4 kg (131 lb)   SpO2 93%   BMI 19.92 kg/m²     Physical Exam  Constitutional:       Appearance: Normal appearance.   HENT:      Head: Normocephalic and atraumatic.   Eyes:      Extraocular Movements: Extraocular movements intact.   Cardiovascular:      Rate and Rhythm: Normal rate and regular rhythm.   Pulmonary:      Effort: Accessory muscle usage and respiratory distress present.      Breath sounds: Decreased breath sounds and wheezing present.   Abdominal:      General: Abdomen is flat. There is no distension.      Palpations: Abdomen is soft.      Tenderness: There is no abdominal tenderness.   Musculoskeletal:      Right lower leg: No edema.      Left lower leg: No edema.   Skin:     General: Skin is warm and dry.   Neurological:      General: No focal deficit present.      Mental Status: He is alert and oriented to person, place, and time.   Psychiatric:         Mood and Affect: Mood normal.         Behavior: Behavior normal.           DDX/DIAGNOSTIC RESULTS / EMERGENCY DEPARTMENT COURSE / MDM     Differential " Diagnosis included but not limited: Small bowel obstruction, cancerous mass in the abdomen, COPD exacerbation, bronchitis, pneumonia, ACS, cause of disorder, spontaneous pneumothorax, pulmonary embolism    Diagnoses Considered but Do Not Suspect: Patient with multiple system complaints differential is broad in this patient    Decision Rules/Scores utilized: N/A     Tests considered but not ordered and why:  N/A     MIPS: N/A     Code Status Discussion:  Not Discussed    Additional Patient Education Provided: None     Medical Decision Making    Medical Decision Making  Patient 70-year-old male who presented with acute dyspnea.  Patient did have bronchitis on CT imaging, concerns for bronchitis chronic bronchitis with emphysematous changes.  Patient with new O2 requirement, does not wear oxygen at home.  Patient had improvement in shortness of breath after steroids and DuoNeb.  Patient continuing to have O2 requirement at this time with ambulation and at rest.  Workup demonstrated no concerns for infectious processes.  Do feel this is a chronic bronchitis with COPD exacerbation.  Admitting patient for further management, home O2 eval, and nebulizer treatments.  Did discuss findings of enlarged prostate with the patient that he will need to follow-up outpatient with urology, also discussed concerning findings for sclerotic lesions in the lumbar and thoracic spine.  Do feel patient needs further workup outpatient for possible cancerous lesions.  I did also discussed the importance of following up with pulmonology for further management of COPD and to quit smoking.  Patient understands this.  Patient with no colonoscopy in the past and recent GI evaluation, patient may need GI evaluation outpatient also due to patient's sudden fullness when tolerating p.o.  No signs of obstruction or obvious concerning findings on CT abdomen pelvis.  Patient was admitted to the hospitalist group for management of COPD at this  time.    Problems Addressed:  Chronic obstructive pulmonary disease with acute exacerbation: complicated acute illness or injury    Amount and/or Complexity of Data Reviewed  External Data Reviewed: notes.  Labs: ordered.  Radiology: ordered.  ECG/medicine tests: ordered.  Discussion of management or test interpretation with external provider(s): Hospitalist for admission    Risk  Prescription drug management.  Decision regarding hospitalization.        See ED COURSE for additional MDM statements    EKG    EKG Interpretation    Evaluated and interpreted by emergency department physician    Rhythm: Normal Sinus Rhythm  Rate: Normal  Axis: Anabel  Ectopy: none  Conduction: Normal  ST Segments: Normal  T Waves: Inversions in aVL  Q Waves: None  Large amount of artifact due to dyspnea makes it difficult to assess but low concerns for STEMI or active ACS  Clinical Impression: Normal Sinus Rhythm    Rodrigo Perez DO      All EKG's are interpreted by the Emergency Department Physician who either signs or Co-signs this chart in the absence of a cardiologist.    Additional Scores                   EMERGENCY DEPARTMENT COURSE:    ED Course as of 05/22/25 1514   Mon May 19, 2025   2335 Discussed the findings of sclerotic lesions in the lumbar and thoracic spine.  Also discussed the enlarged prostate.  Patient noted to have bronchitis.  Patient does not wear oxygen at home.  Patient currently off of oxygen noted to be satting 91%. [CR]   Tue May 20, 2025   0038 SpO2(!): 89 % [CR]      ED Course User Index  [CR] Rodrigo Perez DO       PROCEDURES:  None Performed   Procedures    DATA FOR LAB AND RADIOLOGY TESTS ORDERED BELOW ARE REVIEWED BY THE ED CLINICIAN:    RADIOLOGY: All x-rays, CT, MRI, and formal ultrasound images (except ED bedside ultrasound) are read by the radiologist, see reports below, unless otherwise noted in MDM or here.  Reports below are reviewed by myself.  CT Abdomen Pelvis With  Contrast   Final Result   IMPRESSION:      1. No evidence for bowel obstruction. Enteric contrast extends to the level of the distal rectum.      2. Scattered colonic diverticula without evidence of diverticulitis.      3. Enlarged prostate measures 6.0 cm in transverse diameter. Recommend correlating with PSA values.      4. Mucosal thickening of the urinary bladder may be related to underdistention or chronic outlet obstruction. Recommend correlating with urinary analysis to exclude cystitis.      5. Focal sclerosis of the superior and inferior endplates of T12 and of the inferior endplate of L1. If history of neoplasm recommend nuclear medicine bone scan.      Authenticated and Electronically Signed by Eddie Simms DO on   05/19/2025 10:17:01 PM      CT Angiogram Chest Pulmonary Embolism   Final Result   IMPRESSION:      1. No pulmonary embolism.      2. Emphysematous disease. Bronchial thickening bilaterally greatest involving the lung bases. Findings may represent reactive airways disease including bronchitis. No focal consolidation or large pleural effusion.      3. Coronary atherosclerosis.      4. Enlarged main pulmonary arteries may indicate pulmonary hypertension      5. Multiple sclerotic foci predominantly involving the endplates throughout the thoracic spine. If history of neoplasm recommend nuclear medicine bone scan.      Authenticated and Electronically Signed by Eddie Simms DO on   05/19/2025 10:17:58 PM      XR Chest 1 View   Final Result   No acute cardiopulmonary process.               Images were reviewed, interpreted, and dictated by Dr. Kristen Ayon MD   Transcribed by Zita Rogers PA-C.       This report was signed and finalized on 5/20/2025 9:45 AM by Kristen Ayon MD.              LABS: Lab orders shown below, the results are reviewed by myself, and all abnormals are listed below.  Labs Reviewed   RESPIRATORY PANEL PCR W/ COVID-19 (SARS-COV-2), NP SWAB IN UTM/VTP, 2 HR TAT -  Abnormal; Notable for the following components:       Result Value    Human Rhinovirus/Enterovirus Detected (*)     All other components within normal limits    Narrative:     In the setting of a positive respiratory panel with a viral infection PLUS a negative procalcitonin without other underlying concern for bacterial infection, consider observing off antibiotics or discontinuation of antibiotics and continue supportive care. If the respiratory panel is positive for atypical bacterial infection (Bordetella pertussis, Chlamydophila pneumoniae, or Mycoplasma pneumoniae), consider antibiotic de-escalation to target atypical bacterial infection.   CBC WITH AUTO DIFFERENTIAL - Abnormal; Notable for the following components:    MCH 33.8 (*)     All other components within normal limits   BASIC METABOLIC PANEL - Abnormal; Notable for the following components:    Glucose 219 (*)     Sodium 134 (*)     BUN/Creatinine Ratio 26.6 (*)     All other components within normal limits    Narrative:     GFR Categories in Chronic Kidney Disease (CKD)              GFR Category          GFR (mL/min/1.73)    Interpretation  G1                    90 or greater        Normal or high (1)  G2                    60-89                Mild decrease (1)  G3a                   45-59                Mild to moderate decrease  G3b                   30-44                Moderate to severe decrease  G4                    15-29                Severe decrease  G5                    14 or less           Kidney failure    (1)In the absence of evidence of kidney disease, neither GFR category G1 or G2 fulfill the criteria for CKD.    eGFR calculation 2021 CKD-EPI creatinine equation, which does not include race as a factor   CBC (NO DIFF) - Abnormal; Notable for the following components:    MCH 34.3 (*)     MCHC 36.3 (*)     All other components within normal limits   CBC (NO DIFF) - Abnormal; Notable for the following components:    WBC 14.74 (*)      MCH 34.2 (*)     MCHC 36.6 (*)     RDW 12.1 (*)     All other components within normal limits   BASIC METABOLIC PANEL - Abnormal; Notable for the following components:    Glucose 129 (*)     Creatinine 0.50 (*)     Sodium 134 (*)     BUN/Creatinine Ratio 42.0 (*)     All other components within normal limits    Narrative:     GFR Categories in Chronic Kidney Disease (CKD)              GFR Category          GFR (mL/min/1.73)    Interpretation  G1                    90 or greater        Normal or high (1)  G2                    60-89                Mild decrease (1)  G3a                   45-59                Mild to moderate decrease  G3b                   30-44                Moderate to severe decrease  G4                    15-29                Severe decrease  G5                    14 or less           Kidney failure    (1)In the absence of evidence of kidney disease, neither GFR category G1 or G2 fulfill the criteria for CKD.    eGFR calculation 2021 CKD-EPI creatinine equation, which does not include race as a factor   CBC (NO DIFF) - Abnormal; Notable for the following components:    WBC 14.31 (*)     MCH 33.9 (*)     All other components within normal limits   BASIC METABOLIC PANEL - Abnormal; Notable for the following components:    Glucose 115 (*)     BUN 26 (*)     Creatinine 0.59 (*)     Sodium 134 (*)     Calcium 8.3 (*)     BUN/Creatinine Ratio 44.1 (*)     All other components within normal limits    Narrative:     GFR Categories in Chronic Kidney Disease (CKD)              GFR Category          GFR (mL/min/1.73)    Interpretation  G1                    90 or greater        Normal or high (1)  G2                    60-89                Mild decrease (1)  G3a                   45-59                Mild to moderate decrease  G3b                   30-44                Moderate to severe decrease  G4                    15-29                Severe decrease  G5                    14 or less           Kidney  failure    (1)In the absence of evidence of kidney disease, neither GFR category G1 or G2 fulfill the criteria for CKD.    eGFR calculation 2021 CKD-EPI creatinine equation, which does not include race as a factor   BNP (IN-HOUSE) - Normal    Narrative:     This assay is used as an aid in the diagnosis of individuals suspected of having heart failure. It can be used as an aid in the diagnosis of acute decompensated heart failure (ADHF) in patients presenting with signs and symptoms of ADHF to the emergency department (ED). In addition, NT-proBNP of <300 pg/mL indicates ADHF is not likely.    Age Range Result Interpretation  NT-proBNP Concentration (pg/mL:      <50             Positive            >450                   Gray                 300-450                    Negative             <300    50-75           Positive            >900                  Gray                300-900                  Negative            <300      >75             Positive            >1800                  Gray                300-1800                  Negative            <300   TROPONIN - Normal    Narrative:     High Sensitive Troponin T Reference Range:  <14.0 ng/L- Negative Female for AMI  <22.0 ng/L- Negative Male for AMI  >=14 - Abnormal Female indicating possible myocardial injury.  >=22 - Abnormal Male indicating possible myocardial injury.   Clinicians would have to utilize clinical acumen, EKG, Troponin, and serial changes to determine if it is an Acute Myocardial Infarction or myocardial injury due to an underlying chronic condition.        HIGH SENSITIVITIY TROPONIN T 1HR - Normal    Narrative:     High Sensitive Troponin T Reference Range:  <14.0 ng/L- Negative Female for AMI  <22.0 ng/L- Negative Male for AMI  >=14 - Abnormal Female indicating possible myocardial injury.  >=22 - Abnormal Male indicating possible myocardial injury.   Clinicians would have to utilize clinical acumen, EKG, Troponin, and serial changes to determine  "if it is an Acute Myocardial Infarction or myocardial injury due to an underlying chronic condition.        URINALYSIS WITHOUT MICROSCOPIC (NO CULTURE) - Normal   PROCALCITONIN - Normal    Narrative:     As a Marker for Sepsis (Non-Neonates):    1. <0.5 ng/mL represents a low risk of severe sepsis and/or septic shock.  2. >2 ng/mL represents a high risk of severe sepsis and/or septic shock.    As a Marker for Lower Respiratory Tract Infections that require antibiotic therapy:    PCT on Admission    Antibiotic Therapy       6-12 Hrs later    >0.5                Strongly Recommended  >0.25 - <0.5        Recommended   0.1 - 0.25          Discouraged              Remeasure/reassess PCT  <0.1                Strongly Discouraged     Remeasure/reassess PCT    As 28 day mortality risk marker: \"Change in Procalcitonin Result\" (>80% or <=80%) if Day 0 (or Day 1) and Day 4 values are available. Refer to http://www.[facility] [place]-pct-calculator.com    Change in PCT <=80%  A decrease of PCT levels below or equal to 80% defines a positive change in PCT test result representing a higher risk for 28-day all-cause mortality of patients diagnosed with severe sepsis for septic shock.    Change in PCT >80%  A decrease of PCT levels of more than 80% defines a negative change in PCT result representing a lower risk for 28-day all-cause mortality of patients diagnosed with severe sepsis or septic shock.      RAINBOW DRAW    Narrative:     The following orders were created for panel order [place] Draw.  Procedure                               Abnormality         Status                     ---------                               -----------         ------                     Green Top (Gel)[[id number]]                                  Final result               Lavender Top[[id number]]                                     Final result               Gold Top - SST[[id number]]                                   Final result               Light Blue " Top[[id number]]                                   Final result                 Please view results for these tests on the individual orders.   COMPREHENSIVE METABOLIC PANEL    Narrative:     GFR Categories in Chronic Kidney Disease (CKD)              GFR Category          GFR (mL/min/1.73)    Interpretation  G1                    90 or greater        Normal or high (1)  G2                    60-89                Mild decrease (1)  G3a                   45-59                Mild to moderate decrease  G3b                   30-44                Moderate to severe decrease  G4                    15-29                Severe decrease  G5                    14 or less           Kidney failure    (1)In the absence of evidence of kidney disease, neither GFR category G1 or G2 fulfill the criteria for CKD.    eGFR calculation 2021 CKD-EPI creatinine equation, which does not include race as a factor   URINALYSIS, MICROSCOPIC ONLY   CBC AND DIFFERENTIAL    Narrative:     The following orders were created for panel order CBC & Differential.  Procedure                               Abnormality         Status                     ---------                               -----------         ------                     CBC Auto Differential[[id number]]        Abnormal            Final result                 Please view results for these tests on the individual orders.   GREEN TOP   LAVENDER TOP   GOLD TOP - SST   LIGHT BLUE TOP   URINALYSIS AND MICROSCOPIC    Narrative:     The following orders were created for panel order Urinalysis With Microscopic - Urine, Clean Catch.  Procedure                               Abnormality         Status                     ---------                               -----------         ------                     Urinalysis without micro...[[id number]]  Normal              Final result               Urinalysis, Microscopic ...[[id number]]                      Final result                 Please view results  for these tests on the individual orders.       Vitals Reviewed:    Vitals:    05/22/25 0411 05/22/25 0703 05/22/25 0725 05/22/25 0730   BP: 114/64  166/91    BP Location: Left arm  Left arm    Patient Position: Lying  Lying    Pulse: 64  66    Resp: 18  18    Temp: 97.6 °F (36.4 °C)  97.4 °F (36.3 °C)    TempSrc: Oral  Oral    SpO2:  90% 92% 93%   Weight:       Height:           MEDICATIONS GIVEN TO PATIENT THIS ENCOUNTER:  Medications   methylPREDNISolone sodium succinate (SOLU-Medrol) injection 125 mg (125 mg Intravenous Given 5/19/25 1835)   ipratropium-albuterol (DUO-NEB) nebulizer solution 3 mL (3 mL Nebulization Given 5/19/25 2009)   iopamidol (ISOVUE-300) 61 % injection 100 mL (100 mL Intravenous Given 5/19/25 2113)   diatrizoate meglumine-sodium (GASTROGRAFIN) 66-10 % oral solution 30 mL (30 mL Oral Given 5/19/25 2113)   ipratropium-albuterol (DUO-NEB) nebulizer solution 3 mL (3 mL Nebulization Given 5/19/25 2153)       CONSULTS:  None    CRITICAL CARE:  There was significant risk of life threatening deterioration of patient's condition requiring my direct management. Critical care time 35 minutes, excluding any documented procedures.    FINAL IMPRESSION      1. Acute exacerbation of chronic obstructive pulmonary disease (COPD)    2. Acute respiratory failure with hypoxia    3. Debility    4. Enlarged prostate    5. Chronic obstructive pulmonary disease with acute exacerbation          DISPOSITION / PLAN     ED Disposition       ED Disposition   Decision to Admit    Condition   --    Comment   Level of Care: Telemetry [5]   Diagnosis: Acute exacerbation of chronic obstructive pulmonary disease (COPD) [432337]   Admitting Physician: RAYMUNDO SERVIN [[id number]]   Certification: I Certify That Inpatient Hospital Services Are Medically Necessary For Greater Than 2 Midnights                 PATIENT REFERRED TO:  Natasha Veronica MD  [address] [facility] [address]  [phone]    Follow up on  6/2/2025 June 2nd at @4pm with Enid Garber MD  [address] [facility]  MOB 3   [facility] [address]  [phone]    Follow up in 1 week(s)  [facility] will see first, then schedule with Finn Gross MD  [address] [facility]    [facility] [address]  [phone]    Follow up in 2 week(s)  Office to call patient with an appointment    [address], [address]  [place] [address]  [phone]          DISCHARGE MEDICATIONS:     Medication List        START taking these medications      guaiFENesin 600 MG 12 hr tablet  Commonly known as: MUCINEX  Take 1 tablet by mouth Every 12 (Twelve) Hours for 10 days.     guaiFENesin-dextromethorphan 100-10 MG/5ML syrup  Commonly known as: ROBITUSSIN DM  Take 10 mL by mouth Every 4 (Four) Hours As Needed for Congestion or Cough for up to 10 days.     Nicotine Step 1 21 MG/24HR patch  Generic drug: nicotine  Place 1 patch on the skin as directed by provider Every Night for 14 days.     predniSONE 10 MG tablet  Commonly known as: DELTASONE  Take 4 tablets by mouth Daily for 3 days, THEN 2 tablets Daily for 3 days, THEN 1 tablet Daily for 3 days.  Start taking on: May 22, 2025            CHANGE how you take these medications      * albuterol sulfate  (90 Base) MCG/ACT inhaler  Commonly known as: PROVENTIL HFA;VENTOLIN HFA;PROAIR HFA  Inhale 2 puffs Every 4 (Four) Hours As Needed for wheezing.  What changed: Another medication with the same name was removed. Continue taking this medication, and follow the directions you see here.     * albuterol (2.5 MG/3ML) 0.083% nebulizer solution  Commonly known as: PROVENTIL  Inhale contents of 1 vial through a nebulizer Every 6 (Six) Hours As Needed for wheezing  What changed: Another medication with the same name was removed. Continue taking this medication, and follow the directions you see here.     aspirin 81 MG EC tablet  Take 1 tablet by mouth Daily.  What  changed: Another medication with the same name was removed. Continue taking this medication, and follow the directions you see here.     Breztri Aerosphere 160-9-4.8 MCG/ACT aerosol inhaler  Generic drug: Budeson-Glycopyrrol-Formoterol  Inhale 2 puffs by mouth  2 (Two) Times a Day. Rinse mouth after use  What changed: Another medication with the same name was removed. Continue taking this medication, and follow the directions you see here.     cloNIDine 0.3 MG/24HR patch  Commonly known as: CATAPRES-TTS  Place 1 patch on the skin 1 (One) Time Per Week.  What changed: Another medication with the same name was removed. Continue taking this medication, and follow the directions you see here.     clopidogrel 75 MG tablet  Commonly known as: PLAVIX  Take 1 tablet by mouth Daily.  What changed: Another medication with the same name was removed. Continue taking this medication, and follow the directions you see here.     hydroCHLOROthiazide 25 MG tablet  Take 1 tablet by mouth Daily.  What changed: Another medication with the same name was removed. Continue taking this medication, and follow the directions you see here.     lisinopril 40 MG tablet  Commonly known as: PRINIVIL,ZESTRIL  Take 1 tablet by mouth Daily.  What changed: Another medication with the same name was removed. Continue taking this medication, and follow the directions you see here.     simvastatin 80 MG tablet  Commonly known as: ZOCOR  Take 1 tablet by mouth Every Night.  What changed: Another medication with the same name was removed. Continue taking this medication, and follow the directions you see here.           * This list has 2 medication(s) that are the same as other medications prescribed for you. Read the directions carefully, and ask your doctor or other care provider to review them with you.                CONTINUE taking these medications      carvedilol 25 MG tablet  Commonly known as: COREG  Take 1 tablet by mouth 2 (Two) Times a Day.      Combivent Respimat  MCG/ACT inhaler  Generic drug: ipratropium-albuterol  Inhale 1 puff 4 (Four) Times a Day As Needed for Wheezing.     CoQ10 100 MG capsule  Take 1 capsule (100 mg total) by mouth daily for 90 days.     famotidine 10 MG tablet  Commonly known as: Heartburn Relief  Take 2 tablets by mouth Daily.     magnesium (as) gluconate 500 (27 Mg) MG tablet  Commonly known as: MAGONATE  Take 1 tablet by mouth Daily.     Symbicort 80-4.5 MCG/ACT inhaler  Generic drug: budesonide-formoterol  Inhale 2 puffs by mouth twice daily     Vitamin D 50 MCG (2000 UT) tablet            ASK your doctor about these medications      Fluzone High-Dose Quadrivalent 0.7 ML suspension prefilled syringe injection  Generic drug: Influenza Vac High-Dose Quad  Inject 0.7 mL into the appropriate muscle as directed by prescriber.               Where to Get Your Medications        These medications were sent to [place] [address]      Hours: Monday to Friday 8 AM to 6 PM Phone: [phone]   guaiFENesin 600 MG 12 hr tablet  guaiFENesin-dextromethorphan 100-10 MG/5ML syrup  Nicotine Step 1 21 MG/24HR patch  predniSONE 10 MG tablet         Electronically signed by Rodrigo Perez DO, 05/19/25, 5:59 PM EDT.    Emergency Medicine Physician  [facility]  (Please note that portions of thisnote were completed with a voice recognition program.  Efforts were made to edit the dictations but occasionally words are mis-transcribed.)       Rodrigo Perez DO  05/20/25 0608       Rodrigo Perez DO  05/22/25 5026

## 2025-05-20 PROBLEM — J44.1 ACUTE EXACERBATION OF CHRONIC OBSTRUCTIVE PULMONARY DISEASE (COPD): Status: ACTIVE | Noted: 2025-05-20

## 2025-05-20 LAB
ANION GAP SERPL CALCULATED.3IONS-SCNC: 9 MMOL/L (ref 5–15)
B PARAPERT DNA SPEC QL NAA+PROBE: NOT DETECTED
B PERT DNA SPEC QL NAA+PROBE: NOT DETECTED
BACTERIA UR QL AUTO: NORMAL /HPF
BUN SERPL-MCNC: 21 MG/DL (ref 8–23)
BUN/CREAT SERPL: 26.6 (ref 7–25)
C PNEUM DNA NPH QL NAA+NON-PROBE: NOT DETECTED
CALCIUM SPEC-SCNC: 8.8 MG/DL (ref 8.6–10.5)
CHLORIDE SERPL-SCNC: 98 MMOL/L (ref 98–107)
CO2 SERPL-SCNC: 27 MMOL/L (ref 22–29)
CREAT SERPL-MCNC: 0.79 MG/DL (ref 0.76–1.27)
DEPRECATED RDW RBC AUTO: 42.7 FL (ref 37–54)
EGFRCR SERPLBLD CKD-EPI 2021: 95.6 ML/MIN/1.73
ERYTHROCYTE [DISTWIDTH] IN BLOOD BY AUTOMATED COUNT: 12.3 % (ref 12.3–15.4)
FLUAV SUBTYP SPEC NAA+PROBE: NOT DETECTED
FLUBV RNA ISLT QL NAA+PROBE: NOT DETECTED
GLUCOSE SERPL-MCNC: 219 MG/DL (ref 65–99)
HADV DNA SPEC NAA+PROBE: NOT DETECTED
HCOV 229E RNA SPEC QL NAA+PROBE: NOT DETECTED
HCOV HKU1 RNA SPEC QL NAA+PROBE: NOT DETECTED
HCOV NL63 RNA SPEC QL NAA+PROBE: NOT DETECTED
HCOV OC43 RNA SPEC QL NAA+PROBE: NOT DETECTED
HCT VFR BLD AUTO: 43.8 % (ref 37.5–51)
HGB BLD-MCNC: 15.9 G/DL (ref 13–17.7)
HMPV RNA NPH QL NAA+NON-PROBE: NOT DETECTED
HPIV1 RNA ISLT QL NAA+PROBE: NOT DETECTED
HPIV2 RNA SPEC QL NAA+PROBE: NOT DETECTED
HPIV3 RNA NPH QL NAA+PROBE: NOT DETECTED
HPIV4 P GENE NPH QL NAA+PROBE: NOT DETECTED
HYALINE CASTS UR QL AUTO: NORMAL /LPF
M PNEUMO IGG SER IA-ACNC: NOT DETECTED
MCH RBC QN AUTO: 34.3 PG (ref 26.6–33)
MCHC RBC AUTO-ENTMCNC: 36.3 G/DL (ref 31.5–35.7)
MCV RBC AUTO: 94.4 FL (ref 79–97)
PLATELET # BLD AUTO: 285 10*3/MM3 (ref 140–450)
PMV BLD AUTO: 8.6 FL (ref 6–12)
POTASSIUM SERPL-SCNC: 4.3 MMOL/L (ref 3.5–5.2)
RBC # BLD AUTO: 4.64 10*6/MM3 (ref 4.14–5.8)
RBC # UR STRIP: NORMAL /HPF
REF LAB TEST METHOD: NORMAL
RHINOVIRUS RNA SPEC NAA+PROBE: DETECTED
RSV RNA NPH QL NAA+NON-PROBE: NOT DETECTED
SARS-COV-2 RNA RESP QL NAA+PROBE: NOT DETECTED
SODIUM SERPL-SCNC: 134 MMOL/L (ref 136–145)
SQUAMOUS #/AREA URNS HPF: NORMAL /HPF
WBC # UR STRIP: NORMAL /HPF
WBC NRBC COR # BLD AUTO: 7.23 10*3/MM3 (ref 3.4–10.8)

## 2025-05-20 PROCEDURE — 99223 1ST HOSP IP/OBS HIGH 75: CPT | Performed by: INTERNAL MEDICINE

## 2025-05-20 PROCEDURE — 25010000002 METHYLPREDNISOLONE PER 125 MG: Performed by: INTERNAL MEDICINE

## 2025-05-20 PROCEDURE — 97162 PT EVAL MOD COMPLEX 30 MIN: CPT

## 2025-05-20 PROCEDURE — 94799 UNLISTED PULMONARY SVC/PX: CPT

## 2025-05-20 PROCEDURE — 97165 OT EVAL LOW COMPLEX 30 MIN: CPT

## 2025-05-20 PROCEDURE — 0202U NFCT DS 22 TRGT SARS-COV-2: CPT | Performed by: FAMILY MEDICINE

## 2025-05-20 PROCEDURE — 63710000001 REVEFENACIN 175 MCG/3ML SOLUTION: Performed by: INTERNAL MEDICINE

## 2025-05-20 PROCEDURE — 85027 COMPLETE CBC AUTOMATED: CPT | Performed by: INTERNAL MEDICINE

## 2025-05-20 PROCEDURE — 25010000002 ENOXAPARIN PER 10 MG: Performed by: INTERNAL MEDICINE

## 2025-05-20 PROCEDURE — 80048 BASIC METABOLIC PNL TOTAL CA: CPT | Performed by: INTERNAL MEDICINE

## 2025-05-20 PROCEDURE — 94761 N-INVAS EAR/PLS OXIMETRY MLT: CPT

## 2025-05-20 RX ORDER — SODIUM CHLORIDE 9 MG/ML
40 INJECTION, SOLUTION INTRAVENOUS AS NEEDED
Status: DISCONTINUED | OUTPATIENT
Start: 2025-05-20 | End: 2025-05-22 | Stop reason: HOSPADM

## 2025-05-20 RX ORDER — ATORVASTATIN CALCIUM 10 MG/1
10 TABLET, FILM COATED ORAL DAILY
Status: DISCONTINUED | OUTPATIENT
Start: 2025-05-20 | End: 2025-05-22 | Stop reason: HOSPADM

## 2025-05-20 RX ORDER — ACETAMINOPHEN 325 MG/1
650 TABLET ORAL EVERY 4 HOURS PRN
Status: DISCONTINUED | OUTPATIENT
Start: 2025-05-20 | End: 2025-05-22 | Stop reason: HOSPADM

## 2025-05-20 RX ORDER — ASPIRIN 81 MG/1
81 TABLET ORAL DAILY
Status: DISCONTINUED | OUTPATIENT
Start: 2025-05-20 | End: 2025-05-22 | Stop reason: HOSPADM

## 2025-05-20 RX ORDER — NICOTINE 21 MG/24HR
1 PATCH, TRANSDERMAL 24 HOURS TRANSDERMAL NIGHTLY
Status: DISCONTINUED | OUTPATIENT
Start: 2025-05-20 | End: 2025-05-22 | Stop reason: HOSPADM

## 2025-05-20 RX ORDER — ENOXAPARIN SODIUM 100 MG/ML
40 INJECTION SUBCUTANEOUS NIGHTLY
Status: DISCONTINUED | OUTPATIENT
Start: 2025-05-20 | End: 2025-05-22 | Stop reason: HOSPADM

## 2025-05-20 RX ORDER — BISACODYL 10 MG
10 SUPPOSITORY, RECTAL RECTAL DAILY PRN
Status: DISCONTINUED | OUTPATIENT
Start: 2025-05-20 | End: 2025-05-22 | Stop reason: HOSPADM

## 2025-05-20 RX ORDER — BISACODYL 5 MG/1
5 TABLET, DELAYED RELEASE ORAL DAILY PRN
Status: DISCONTINUED | OUTPATIENT
Start: 2025-05-20 | End: 2025-05-22 | Stop reason: HOSPADM

## 2025-05-20 RX ORDER — ARFORMOTEROL TARTRATE 15 UG/2ML
15 SOLUTION RESPIRATORY (INHALATION)
Status: DISCONTINUED | OUTPATIENT
Start: 2025-05-20 | End: 2025-05-22 | Stop reason: HOSPADM

## 2025-05-20 RX ORDER — SODIUM CHLORIDE 0.9 % (FLUSH) 0.9 %
10 SYRINGE (ML) INJECTION EVERY 12 HOURS SCHEDULED
Status: DISCONTINUED | OUTPATIENT
Start: 2025-05-20 | End: 2025-05-22 | Stop reason: HOSPADM

## 2025-05-20 RX ORDER — SODIUM CHLORIDE 0.9 % (FLUSH) 0.9 %
10 SYRINGE (ML) INJECTION AS NEEDED
Status: DISCONTINUED | OUTPATIENT
Start: 2025-05-20 | End: 2025-05-22 | Stop reason: HOSPADM

## 2025-05-20 RX ORDER — ONDANSETRON 2 MG/ML
4 INJECTION INTRAMUSCULAR; INTRAVENOUS EVERY 6 HOURS PRN
Status: DISCONTINUED | OUTPATIENT
Start: 2025-05-20 | End: 2025-05-22 | Stop reason: HOSPADM

## 2025-05-20 RX ORDER — CLONIDINE 0.3 MG/24H
1 PATCH, EXTENDED RELEASE TRANSDERMAL WEEKLY
Status: DISCONTINUED | OUTPATIENT
Start: 2025-05-20 | End: 2025-05-22 | Stop reason: HOSPADM

## 2025-05-20 RX ORDER — POLYETHYLENE GLYCOL 3350 17 G/17G
17 POWDER, FOR SOLUTION ORAL DAILY PRN
Status: DISCONTINUED | OUTPATIENT
Start: 2025-05-20 | End: 2025-05-22 | Stop reason: HOSPADM

## 2025-05-20 RX ORDER — ACETAMINOPHEN 650 MG/1
650 SUPPOSITORY RECTAL EVERY 4 HOURS PRN
Status: DISCONTINUED | OUTPATIENT
Start: 2025-05-20 | End: 2025-05-22 | Stop reason: HOSPADM

## 2025-05-20 RX ORDER — AMOXICILLIN 250 MG
2 CAPSULE ORAL 2 TIMES DAILY
Status: DISCONTINUED | OUTPATIENT
Start: 2025-05-20 | End: 2025-05-22 | Stop reason: HOSPADM

## 2025-05-20 RX ORDER — LISINOPRIL 20 MG/1
40 TABLET ORAL DAILY
Status: DISCONTINUED | OUTPATIENT
Start: 2025-05-20 | End: 2025-05-22 | Stop reason: HOSPADM

## 2025-05-20 RX ORDER — HYDROCHLOROTHIAZIDE 25 MG/1
25 TABLET ORAL DAILY
Status: DISCONTINUED | OUTPATIENT
Start: 2025-05-20 | End: 2025-05-22 | Stop reason: HOSPADM

## 2025-05-20 RX ORDER — METHYLPREDNISOLONE SODIUM SUCCINATE 125 MG/2ML
60 INJECTION, POWDER, LYOPHILIZED, FOR SOLUTION INTRAMUSCULAR; INTRAVENOUS EVERY 8 HOURS
Status: DISCONTINUED | OUTPATIENT
Start: 2025-05-20 | End: 2025-05-21

## 2025-05-20 RX ORDER — IPRATROPIUM BROMIDE AND ALBUTEROL SULFATE 2.5; .5 MG/3ML; MG/3ML
3 SOLUTION RESPIRATORY (INHALATION) EVERY 4 HOURS PRN
Status: DISCONTINUED | OUTPATIENT
Start: 2025-05-20 | End: 2025-05-22 | Stop reason: HOSPADM

## 2025-05-20 RX ORDER — GUAIFENESIN 600 MG/1
600 TABLET, EXTENDED RELEASE ORAL EVERY 12 HOURS SCHEDULED
Status: DISCONTINUED | OUTPATIENT
Start: 2025-05-20 | End: 2025-05-22 | Stop reason: HOSPADM

## 2025-05-20 RX ORDER — BUDESONIDE 0.5 MG/2ML
0.5 INHALANT ORAL
Status: DISCONTINUED | OUTPATIENT
Start: 2025-05-20 | End: 2025-05-22 | Stop reason: HOSPADM

## 2025-05-20 RX ORDER — CLOPIDOGREL BISULFATE 75 MG/1
75 TABLET ORAL DAILY
Status: DISCONTINUED | OUTPATIENT
Start: 2025-05-20 | End: 2025-05-22 | Stop reason: HOSPADM

## 2025-05-20 RX ORDER — CARVEDILOL 25 MG/1
25 TABLET ORAL 2 TIMES DAILY
Status: DISCONTINUED | OUTPATIENT
Start: 2025-05-20 | End: 2025-05-22 | Stop reason: HOSPADM

## 2025-05-20 RX ORDER — ACETAMINOPHEN 160 MG/5ML
650 SOLUTION ORAL EVERY 4 HOURS PRN
Status: DISCONTINUED | OUTPATIENT
Start: 2025-05-20 | End: 2025-05-22 | Stop reason: HOSPADM

## 2025-05-20 RX ADMIN — Medication 10 ML: at 02:38

## 2025-05-20 RX ADMIN — ATORVASTATIN CALCIUM 10 MG: 10 TABLET, FILM COATED ORAL at 09:26

## 2025-05-20 RX ADMIN — ENOXAPARIN SODIUM 40 MG: 100 INJECTION SUBCUTANEOUS at 02:33

## 2025-05-20 RX ADMIN — GUAIFENESIN 600 MG: 600 TABLET, MULTILAYER, EXTENDED RELEASE ORAL at 10:27

## 2025-05-20 RX ADMIN — ASPIRIN 81 MG: 81 TABLET, COATED ORAL at 09:24

## 2025-05-20 RX ADMIN — CLOPIDOGREL BISULFATE 75 MG: 75 TABLET, FILM COATED ORAL at 09:25

## 2025-05-20 RX ADMIN — Medication 5 MG: at 03:11

## 2025-05-20 RX ADMIN — METHYLPREDNISOLONE SODIUM SUCCINATE 60 MG: 125 INJECTION INTRAMUSCULAR; INTRAVENOUS at 17:35

## 2025-05-20 RX ADMIN — HYDROCHLOROTHIAZIDE 25 MG: 25 TABLET ORAL at 09:25

## 2025-05-20 RX ADMIN — CLONIDINE 1 PATCH: 0.3 PATCH, EXTENDED RELEASE TRANSDERMAL at 09:29

## 2025-05-20 RX ADMIN — ENOXAPARIN SODIUM 40 MG: 100 INJECTION SUBCUTANEOUS at 20:26

## 2025-05-20 RX ADMIN — NICOTINE 1 PATCH: 21 PATCH TRANSDERMAL at 20:28

## 2025-05-20 RX ADMIN — LISINOPRIL 40 MG: 20 TABLET ORAL at 09:26

## 2025-05-20 RX ADMIN — METHYLPREDNISOLONE SODIUM SUCCINATE 60 MG: 125 INJECTION INTRAMUSCULAR; INTRAVENOUS at 02:33

## 2025-05-20 RX ADMIN — Medication 10 ML: at 09:29

## 2025-05-20 RX ADMIN — GUAIFENESIN 600 MG: 600 TABLET, MULTILAYER, EXTENDED RELEASE ORAL at 20:27

## 2025-05-20 RX ADMIN — BUDESONIDE 0.5 MG: 0.5 SUSPENSION RESPIRATORY (INHALATION) at 19:47

## 2025-05-20 RX ADMIN — CARVEDILOL 25 MG: 25 TABLET, FILM COATED ORAL at 09:25

## 2025-05-20 RX ADMIN — BUDESONIDE 0.5 MG: 0.5 SUSPENSION RESPIRATORY (INHALATION) at 07:25

## 2025-05-20 RX ADMIN — Medication 10 ML: at 20:30

## 2025-05-20 RX ADMIN — REVEFENACIN 175 MCG: 175 SOLUTION RESPIRATORY (INHALATION) at 07:25

## 2025-05-20 RX ADMIN — GUAIFENESIN 600 MG: 600 TABLET, MULTILAYER, EXTENDED RELEASE ORAL at 02:33

## 2025-05-20 RX ADMIN — METHYLPREDNISOLONE SODIUM SUCCINATE 60 MG: 125 INJECTION INTRAMUSCULAR; INTRAVENOUS at 10:27

## 2025-05-20 RX ADMIN — NICOTINE 1 PATCH: 21 PATCH TRANSDERMAL at 02:33

## 2025-05-20 RX ADMIN — ARFORMOTEROL TARTRATE 15 MCG: 15 SOLUTION RESPIRATORY (INHALATION) at 19:47

## 2025-05-20 RX ADMIN — CARVEDILOL 25 MG: 25 TABLET, FILM COATED ORAL at 20:27

## 2025-05-20 RX ADMIN — ARFORMOTEROL TARTRATE 15 MCG: 15 SOLUTION RESPIRATORY (INHALATION) at 07:25

## 2025-05-20 NOTE — THERAPY EVALUATION
Patient Name: Lebron Miller  : 1955    MRN: [id number]                              Today's Date: 2025       Admit Date: 2025    Visit Dx: No diagnosis found.  Patient Active Problem List   Diagnosis    Atopic rhinitis    Chronic coronary artery disease    Arthritis    Disc disorder of cervical region    Hyperlipidemia    Hypertension    Psoriasis    Tobacco abuse    Vitamin D deficiency    Moderate single current episode of major depressive disorder    Screening PSA (prostate specific antigen)    Encounter for Medicare annual wellness exam    Acute exacerbation of chronic obstructive pulmonary disease (COPD)     Past Medical History:   Diagnosis Date    Allergic rhinitis     Arthritis     Hypertension     Myocardial infarction     Psoriasis     Skin cancer      Past Surgical History:   Procedure Laterality Date    CORONARY STENT PLACEMENT        General Information       Row Name 25 1330          Physical Therapy Time and Intention    Document Type evaluation  -MS     Mode of Treatment physical therapy  -MS       Row Name 25 1330          General Information    Patient Profile Reviewed yes  -MS     Prior Level of Function independent:;all household mobility;community mobility  -MS     Existing Precautions/Restrictions fall  -MS     Barriers to Rehab medically complex  -MS       Row Name 25 1330          Living Environment    Current Living Arrangements home  -MS     People in Home child(ron), adult  -MS       Row Name 25 1330          Home Main Entrance    Number of Stairs, Main Entrance three  -MS     Stair Railings, Main Entrance railings on both sides of stairs  -MS       Row Name 25 1330          Stairs Within Home, Primary    Number of Stairs, Within Home, Primary none  -MS       Row Name 25 1330          Cognition    Orientation Status (Cognition) oriented x 4  -MS       Row Name 25 1330          Safety Issues/Impairments Affecting Functional  Mobility    Safety Issues Affecting Function (Mobility) positioning of assistive device;sequencing abilities  -MS     Impairments Affecting Function (Mobility) endurance/activity tolerance;shortness of breath  -MS               User Key  (r) = Recorded By, (t) = Taken By, (c) = Cosigned By      Initials Name Provider Type    Naif Arnold, PT Physical Therapist                   Mobility       Row Name 05/20/25 1334          Bed Mobility    Bed Mobility bed mobility (all) activities  -MS     All Activities, [place] (Bed Mobility) modified independence  -MS     Assistive Device (Bed Mobility) head of bed elevated  -MS       Row Name 05/20/25 1334          Sit-Stand Transfer    Sit-Stand [place] (Transfers) independent  -MS       Row Name 05/20/25 1334          Gait/Stairs (Locomotion)    [place] Level (Gait) contact guard  -MS     Assistive Device (Gait) walker, front-wheeled  -MS     Patient was able to Ambulate yes  -MS     Distance in Feet (Gait) 84  -MS     Deviations/Abnormal Patterns (Gait) gait speed decreased  -MS     Bilateral Gait Deviations forward flexed posture  -MS               User Key  (r) = Recorded By, (t) = Taken By, (c) = Cosigned By      Initials Name Provider Type    Naif Arnold, PT Physical Therapist                   Obj/Interventions       Row Name 05/20/25 1335          Range of Motion Comprehensive    General Range of Motion bilateral lower extremity ROM WFL  -MS       Row Name 05/20/25 1335          Strength Comprehensive (MMT)    General Manual Muscle Testing (MMT) Assessment lower extremity strength deficits identified  -MS     Comment, General Manual Muscle Testing (MMT) Assessment B LE 4-/5  -MS       Row Name 05/20/25 1335          Sensory Assessment (Somatosensory)    Sensory Assessment (Somatosensory) sensation intact  -MS               User Key  (r) = Recorded By, (t) = Taken By, (c) = Cosigned By      Initials Name Provider Type    MS Bentley,  Naif, PT Physical Therapist                   Goals/Plan       Row Name 05/20/25 1341          Transfer Goal 1 (PT)    Activity/Assistive Device (Transfer Goal 1, PT) transfers, all;sit-to-stand/stand-to-sit  -MS     [place] Level/Cues Needed (Transfer Goal 1, PT) modified independence  -MS     Time Frame (Transfer Goal 1, PT) long term goal (LTG);2 weeks  -MS       Row Name 05/20/25 1340          Gait Training Goal 1 (PT)    Activity/Assistive Device (Gait Training Goal 1, PT) gait (walking locomotion);assistive device use  -MS     [place] Level (Gait Training Goal 1, PT) standby assist  -MS     Distance (Gait Training Goal 1, PT) 150ft  -MS     Time Frame (Gait Training Goal 1, PT) long term goal (LTG);2 weeks  -MS       Row Name 05/20/25 1340          Patient Education Goal (PT)    Activity (Patient Education Goal, PT) ther exer x15 reps ea  -MS     [place]/Cues/Accuracy (Memory Goal 2, PT) demonstrates adequately  -MS     Time Frame (Patient Education Goal, PT) short term goal (STG);1 week  -MS       Row Name 05/20/25 1340          Therapy Assessment/Plan (PT)    Planned Therapy Interventions (PT) balance training;bed mobility training;gait training;home exercise program;ROM (range of motion);stair training;strengthening;patient/family education;transfer training  -MS               User Key  (r) = Recorded By, (t) = Taken By, (c) = Cosigned By      Initials Name Provider Type    MS Naif Bentley, PT Physical Therapist                   Clinical Impression       Row Name 05/20/25 7393          Pain    Pretreatment Pain Rating 0/10 - no pain  -MS     Posttreatment Pain Rating 0/10 - no pain  -MS       Row Name 05/20/25 1337          Plan of Care Review    Plan of Care Reviewed With patient  -MS     Progress no change  -MS     Outcome Evaluation Pt participated in PT eval this date. Pt lives home with his son. Pt normally independent. Pt sat EOB independently. Pt stood, then ambulated 84ft with  Rwx and CGA. Pt did have to take a standing rest break. Pt was SOA abd breathing heavily upon return. Pt's O2 sats 92%. Pt would benefit from skilled inpatient PT tx to address deficits in gait, endurance and activity tolerance.  -MS       Row Name 05/20/25 1335          Therapy Assessment/Plan (PT)    Rehab Potential (PT) good  -MS     Criteria for Skilled Interventions Met (PT) yes;meets criteria  -MS     Therapy Frequency (PT) 5 times/wk  -MS       Row Name 05/20/25 1335          Vital Signs    Pre SpO2 (%) 93  -MS     O2 Delivery Pre Treatment supplemental O2  1L  -MS     Intra SpO2 (%) 92  -MS     O2 Delivery Intra Treatment room air  -MS     Post SpO2 (%) 94  -MS     O2 Delivery Post Treatment room air  -MS     Pre Patient Position Supine  -MS     Intra Patient Position Standing  -MS     Post Patient Position Supine  -MS       Row Name 05/20/25 1335          Positioning and Restraints    Pre-Treatment Position in bed  -MS     Post Treatment Position bed  -MS     In Bed fowlers;call light within reach;encouraged to call for assist  -MS               User Key  (r) = Recorded By, (t) = Taken By, (c) = Cosigned By      Initials Name Provider Type    MS Naif Bentley, PT Physical Therapist                   Outcome Measures       Row Name 05/20/25 1342 05/20/25 1058       How much help from another person do you currently need...    Turning from your back to your side while in flat bed without using bedrails? 4  -MS 3  -KM    Moving from lying on back to sitting on the side of a flat bed without bedrails? 4  -MS 3  -KM    Moving to and from a bed to a chair (including a wheelchair)? 3  -MS 3  -KM    Standing up from a chair using your arms (e.g., wheelchair, bedside chair)? 3  -MS 3  -KM    Climbing 3-5 steps with a railing? 3  -MS 2  -KM    To walk in hospital room? 3  -MS 3  -KM    AM-PAC 6 Clicks Score (PT) 20  -MS 17  -KM      Row Name 05/20/25 1234          Functional Assessment    Outcome Measure Options  AM-PAC 6 Clicks Daily Activity (OT)  -               User Key  (r) = Recorded By, (t) = Taken By, (c) = Cosigned By      Initials Name Provider Type    Aarti Hawkins Occupational Therapist    MS Naif Bentley, PT Physical Therapist    María Elena Oquendo, RN Registered Nurse                                 Physical Therapy Education       Title: PT OT SLP Therapies (In Progress)       Topic: Physical Therapy (In Progress)       Point: Mobility training (Done)       Learning Progress Summary            Patient Acceptance, E, VU by MS at 5/20/2025 1341    Comment: importance of mobility                      Point: Home exercise program (Done)       Learning Progress Summary            Patient Acceptance, E, VU by MS at 5/20/2025 1341    Comment: importance of mobility                      Point: Body mechanics (Not Started)       Learner Progress:  Not documented in this visit.              Point: Precautions (Not Started)       Learner Progress:  Not documented in this visit.                              User Key       Initials Effective Dates Name Provider Type Discipline    MS 08/22/23 -  Naif Bentley, PT Physical Therapist PT                  PT Recommendation and Plan  Planned Therapy Interventions (PT): balance training, bed mobility training, gait training, home exercise program, ROM (range of motion), stair training, strengthening, patient/family education, transfer training  Progress: no change  Outcome Evaluation: Pt participated in PT eval this date. Pt lives home with his son. Pt normally independent. Pt sat EOB independently. Pt stood, then ambulated 84ft with Rwx and CGA. Pt did have to take a standing rest break. Pt was SOA abd breathing heavily upon return. Pt's O2 sats 92%. Pt would benefit from skilled inpatient PT tx to address deficits in gait, endurance and activity tolerance.     Time Calculation:   PT Evaluation Complexity  History, PT Evaluation Complexity: 1-2 personal factors  and/or comorbidities  Examination of Body Systems (PT Eval Complexity): total of 3 or more elements  Clinical Presentation (PT Evaluation Complexity): evolving  Clinical Decision Making (PT Evaluation Complexity): moderate complexity  Overall Complexity (PT Evaluation Complexity): moderate complexity     PT Charges       Row Name 05/20/25 1342             Time Calculation    Start Time 0951  -MS      PT Received On 05/20/25  -MS      PT Goal Re-Cert Due Date 05/30/25  -MS         Untimed Charges    PT Eval/Re-eval Minutes 47  -MS         Total Minutes    Untimed Charges Total Minutes 47  -MS       Total Minutes 47  -MS                User Key  (r) = Recorded By, (t) = Taken By, (c) = Cosigned By      Initials Name Provider Type    MS Naif Bentley, PT Physical Therapist                  Therapy Charges for Today       Code Description Service Date Service Provider Modifiers Qty    34778557025 HC PT EVAL MOD COMPLEXITY 3 5/20/2025 Naif Bentley, PT GP 1            PT G-Codes  Outcome Measure Options: AM-PAC 6 Clicks Daily Activity (OT)  AM-PAC 6 Clicks Score (PT): 20  AM-PAC 6 Clicks Score (OT): 24  PT Discharge Summary  Anticipated Discharge Disposition (PT): home with assist, home with home health    Naif Bentley PT  5/20/2025

## 2025-05-20 NOTE — CASE MANAGEMENT/SOCIAL WORK
Discharge Planning Assessment  [facility]     Patient Name: Lebron Miller  MRN: [id number]  Today's Date: 5/20/2025    Admit Date: 5/19/2025    Plan: Patient plans to return home with son; should the patient require O2 at DC, he does not have a DME agency preference.    ADDENDUM:  PT staff informed this  that the patient will need a rollator on DC to provide stability and aid with improved gait and balance. The patient does not have a DME agency preference at this time.      Discharge Needs Assessment       Row Name 05/20/25 0853       Living Environment    People in Home child(ron), adult    Name(s) of People in Home Jeremie Miller, Son    Current Living Arrangements home    Duration at Residence 14 years    Potentially Unsafe Housing Conditions none    In the past 12 months has the electric, gas, oil, or water company threatened to shut off services in your home? No    Primary Care Provided by self    Provides Primary Care For no one, unable/limited ability to care for self    Family Caregiver if Needed none    Quality of Family Relationships helpful;involved;supportive    Able to Return to Prior Arrangements yes       Resource/Environmental Concerns    Resource/Environmental Concerns none    Transportation Concerns none       Transportation Needs    In the past 12 months, has lack of transportation kept you from medical appointments or from getting medications? no    In the past 12 months, has lack of transportation kept you from meetings, work, or from getting things needed for daily living? No       Food Insecurity    Within the past 12 months, you worried that your food would run out before you got the money to buy more. Never true    Within the past 12 months, the food you bought just didn't last and you didn't have money to get more. Never true       Transition Planning    Patient/Family Anticipates Transition to home with family    Patient/Family Anticipated Services at Transition none     Transportation Anticipated family or friend will provide       Discharge Needs Assessment    Readmission Within the Last 30 Days no previous admission in last 30 days    Equipment Currently Used at Home cane, straight    Concerns to be Addressed discharge planning    Do you want help finding or keeping work or a job? I do not need or want help    Do you want help with school or training? For example, starting or completing job training or getting a high school diploma, GED or equivalent No    Anticipated Changes Related to Illness inability to care for self    Equipment Needed After Discharge none  Should the patient require O2 at DC, he does not have a DME agency preference.    Provided Post Acute Provider List? N/A    Provided Post Acute Provider Quality & Resource List? N/A    Offered/Gave Vendor List no    Patient's Choice of Community Agency(s) Should the patient require O2 at DC, he does not have a DME agency preference.    Current Discharge Risk chronically ill                   Discharge Plan       Row Name 05/20/25 0854       Plan    Plan Patient plans to return home with son; should the patient require O2 at DC, he does not have a DME agency preference.    Patient/Family in Agreement with Plan yes    Provided Post Acute Provider List? N/A    Provided Post Acute Provider Quality & Resource List? N/A    Plan Comments The patient is awake and able to answer questions.  He is a current patient of [facility] and gets his medications from  Pharmacy.  He elects to enroll in Meds to Bed.  He uses a cane at baseline. Should the patient require O2 at DC, he does not have a DME agency preference.  At the time of DC the patient plans to return home with his son.  Questions and concerns were addressed, IMM delivered, and [facility] resources provided.  Will provide additional resources and information upon request.    Final Note Plans to DC home                       Demographic Summary       Row Name  05/20/25 0852       General Information    Admission Type inpatient    Arrived From emergency department    Required Notices Provided Important Message from Medicare    Referral Source admission list    Reason for Consult discharge planning    Preferred Language English       Contact Information    Permission Granted to Share Info With                    Functional Status       Row Name 05/20/25 0852       Functional Status    Usual Activity Tolerance good    Current Activity Tolerance fair       Physical Activity    On average, how many days per week do you engage in moderate to strenuous exercise (like a brisk walk)? 0 days    On average, how many minutes do you engage in exercise at this level? 0 min    Number of minutes of exercise per week 0       Assessment of Health Literacy    How often do you have someone help you read hospital materials? Never    How often do you have problems learning about your medical condition because of difficulty understanding written information? Never    How often do you have a problem understanding what is told to you about your medical condition? Never    How confident are you filling out medical forms by yourself? Extremely    Health Literacy Excellent       Functional Status, IADL    Medications independent    Meal Preparation assistive person    Housekeeping assistive person    Laundry independent    Shopping assistive person    If for any reason you need help with day-to-day activities such as bathing, preparing meals, shopping, managing finances, etc., do you get the help you need? I get all the help I need       Mental Status    General Appearance WDL WDL       Mental Status Summary    Recent Changes in Mental Status/Cognitive Functioning no changes                   Psychosocial       Row Name 05/20/25 0852       Values/Beliefs    Spiritual, Cultural Beliefs, Yarsanism Practices, Values that Affect Care no       Behavior WDL    Behavior WDL WDL       Emotion  Mood WDL    Emotion/Mood/Affect WDL WDL       Speech WDL    Speech WDL WDL       Perceptual State WDL    Perceptual State WDL WDL       Thought Process WDL    Thought Process WDL WDL       Intellectual Performance WDL    Intellectual Performance WDL WDL                   Abuse/Neglect       Row Name 05/20/25 0852       Personal Safety    Feels Unsafe at Home or Work/School no    Feels Threatened by Someone no    Does Anyone Try to Keep You From Having Contact with Others or Doing Things Outside Your Home? no    Physical Signs of Abuse Present no                   Legal       Row Name 05/20/25 0852       Financial Resource Strain    How hard is it for you to pay for the very basics like food, housing, medical care, and heating? Not hard                   Substance Abuse       Row Name 05/20/25 0853       Substance Use    Substance Use Status current tobacco use    Last Tobacco Use Date 05/19/25                   Patient Forms       Row Name 05/20/25 0857       Patient Forms    Important Message from Medicare (IMM) Delivered    Delivered to Patient    Method of delivery In person                      Jillian Tejada RN

## 2025-05-20 NOTE — PROGRESS NOTES
"Pharmacy Consult - Enoxaparin Dosing  Lebron Miller is a 70 y.o. male who has been consulted to dose enoxaparin for VTE Prophylaxis.     Allergies  Patient has no known allergies.    Relevant clinical data and objective history reviewed:   [Ht: 172.7 cm (68\"); Wt: 59.4 kg (131 lb)]  Body mass index is 19.92 kg/m².  Estimated Creatinine Clearance: 73.1 mL/min (by C-G formula based on SCr of 0.79 mg/dL).  Results from last 7 days   Lab Units 05/19/25  1829   HEMOGLOBIN g/dL 15.8   HEMATOCRIT % 44.7   PLATELETS 10*3/mm3 282   CREATININE mg/dL 0.79       Asessment/Plan  Initiate enoxaparin 40 mg  SQ every 24 hours  Pharmacy will monitor Mr. Miller's renal function and clinical status and adjust the enoxaparin dose and/or frequency as needed.    Thanks,   Tia Isaacs [facility]  5/20/2025  02:13 EDT      "

## 2025-05-20 NOTE — CASE MANAGEMENT/SOCIAL WORK
Case Management/Social Work    Patient Name:  Lebron Miller  YOB: 1955  MRN: [id number]  Admit Date:  5/19/2025    Noted patient to have [facility] Medicare Advantage coverage.  Provided patient with information and contact number for [facility] through [facility] for additional outpatient resources.  Patient was accepting of information.     Electronically signed by:  Jillian Tejada RN  05/20/25 08:49 EDT

## 2025-05-20 NOTE — PLAN OF CARE
Goal Outcome Evaluation:  Plan of Care Reviewed With: patient        Progress: no change  Outcome Evaluation: Transferred from [facility] on 2L NC, denies SOA at rest but gets exerted with movement. Utilzing I/S, still with congested cough with yellow sputum. Reports weakness but improving. Tolerating po intake. POC ongoing.

## 2025-05-20 NOTE — PLAN OF CARE
Goal Outcome Evaluation:  Plan of Care Reviewed With: patient        Progress: no change  Outcome Evaluation: Pt participated in PT eval this date. Pt lives home with his son. Pt normally independent. Pt sat EOB independently. Pt stood, then ambulated 84ft with Rwx and CGA. Pt did have to take a standing rest break. Pt was SOA abd breathing heavily upon return. Pt's O2 sats 92%. Pt would benefit from skilled inpatient PT tx to address deficits in gait, endurance and activity tolerance.    Anticipated Discharge Disposition (PT): home with assist, home with home health

## 2025-05-20 NOTE — THERAPY DISCHARGE NOTE
Acute Care - Occupational Therapy Discharge  [facility]    Patient Name: Lebron Miller  : 1955    MRN: [id number]                              Today's Date: 2025       Admit Date: 2025    Visit Dx: No diagnosis found.  Patient Active Problem List   Diagnosis    Atopic rhinitis    Chronic coronary artery disease    Arthritis    Disc disorder of cervical region    Hyperlipidemia    Hypertension    Psoriasis    Tobacco abuse    Vitamin D deficiency    Moderate single current episode of major depressive disorder    Screening PSA (prostate specific antigen)    Encounter for Medicare annual wellness exam    Acute exacerbation of chronic obstructive pulmonary disease (COPD)     Past Medical History:   Diagnosis Date    Allergic rhinitis     Arthritis     Hypertension     Myocardial infarction     Psoriasis     Skin cancer      Past Surgical History:   Procedure Laterality Date    CORONARY STENT PLACEMENT        General Information       Row Name 25 1025          OT Time and Intention    Document Type discharge evaluation/summary  -     Mode of Treatment occupational therapy  -     Patient Effort good  -     Symptoms Noted During/After Treatment shortness of breath  -       Row Name 25 1025          General Information    Patient Profile Reviewed yes  -     Prior Level of Function independent:;ADL's;all household mobility  -     Existing Precautions/Restrictions fall  -     Barriers to Rehab medically complex  -       Row Name 25 1025          Occupational Profile    Reason for Services/Referral (Occupational Profile) ADL decline  -       Row Name 25 1025          Living Environment    Current Living Arrangements home  -     People in Home child(ron), adult  -       Row Name 25 1025          Home Main Entrance    Number of Stairs, Main Entrance three  -     Stair Railings, Main Entrance railings on both sides of stairs  -       Row Name 25 1025           Stairs Within Home, Primary    Number of Stairs, Within Home, Primary none  -[facility] Name 05/20/25 1025          Cognition    Orientation Status (Cognition) oriented x 4  -[facility] Name 05/20/25 1025          Safety Issues/Impairments Affecting Functional Mobility    Impairments Affecting Function (Mobility) endurance/activity tolerance;shortness of breath  -               User Key  (r) = Recorded By, (t) = Taken By, (c) = Cosigned By      Initials Name Provider Type     Aarti Estrada Occupational Therapist                   Mobility/ADL's       [facility] Name 05/20/25 1027          Bed Mobility    Bed Mobility bed mobility (all) activities  -     All Activities, Benzie (Bed Mobility) modified independence  -     Assistive Device (Bed Mobility) head of bed elevated  -AH       Row Name 05/20/25 1027          Transfers    Transfers sit-stand transfer  -AH       Row Name 05/20/25 1027          Sit-Stand Transfer    Sit-Stand Benzie (Transfers) independent  -[facility] Name 05/20/25 1027          Functional Mobility    Functional Mobility- Ind. Level contact guard assist  -     Functional Mobility- Device walker, front-wheeled  -     Functional Mobility-Distance (Feet) 84  -     Patient was able to Ambulate yes  -AH       Row Name 05/20/25 1027          Activities of Daily Living    BADL Assessment/Intervention bathing;upper body dressing;lower body dressing;grooming;feeding;toileting  -[facility] Name 05/20/25 1027          Bathing Assessment/Intervention    Benzie Level (Bathing) set up  -[facility] Name 05/20/25 1027          Upper Body Dressing Assessment/Training    Benzie Level (Upper Body Dressing) independent  -[facility] Name 05/20/25 1027          Lower Body Dressing Assessment/Training    Benzie Level (Lower Body Dressing) independent  -[facility] Name 05/20/25 1027          Grooming Assessment/Training    Benzie Level (Grooming)  independent  -[facility] Name 05/20/25 1027          Self-Feeding Assessment/Training    Amory Level (Feeding) independent  -AH       Row Name 05/20/25 1027          Toileting Assessment/Training    Amory Level (Toileting) independent  -               User Key  (r) = Recorded By, (t) = Taken By, (c) = Cosigned By      Initials Name Provider Type    Aarti Hawkins Occupational Therapist                   Obj/Interventions       [facility] Name 05/20/25 1230          Sensory Assessment (Somatosensory)    Sensory Assessment (Somatosensory) sensation intact  -AH       Row Name 05/20/25 1230          Vision Assessment/Intervention    Visual Impairment/Limitations WFL;corrective lenses full-time  -AH       Row Name 05/20/25 1230          Range of Motion Comprehensive    General Range of Motion bilateral upper extremity ROM WNL  -AH       Row Name 05/20/25 1230          Strength Comprehensive (MMT)    Comment, General Manual Muscle Testing (MMT) Assessment BUE WFL  -               User Key  (r) = Recorded By, (t) = Taken By, (c) = Cosigned By      Initials Name Provider Type    Aarti Hawkins Occupational Therapist                   Goals/Plan    No documentation.                  Clinical Impression       Row Name 05/20/25 1231          Pain Assessment    Pretreatment Pain Rating 0/10 - no pain  -     Posttreatment Pain Rating 0/10 - no pain  -AH       Row Name 05/20/25 1231          Plan of Care Review    Plan of Care Reviewed With patient  -     Progress no change  -     Outcome Evaluation Pt seen for OT evaluation today.  Pt lives at home with his son and is normally independent with all self care and mobility tasks.  Pt reports he drives.  Pt received supine in bed and was able to sit eob independently, stood independently and walked 84' with Rw and cga.  pt was noted to get soa, but his O2 sats were 92% on RA after walking.  Pt is independent with his self care tasks and has no skilled OT  needs at this time.  Pt would benefit from rollator for energy conservation at home and in the community.  OT will sign off.  -       Row Name 05/20/25 1231          Therapy Assessment/Plan (OT)    Patient/Family Therapy Goal Statement (OT) d/c home  -     Therapy Frequency (OT) evaluation only  -       Row Name 05/20/25 1231          Vital Signs    Pre SpO2 (%) 93  -AH     O2 Delivery Pre Treatment supplemental O2  1L  -AH     Intra SpO2 (%) 92  -AH     O2 Delivery Intra Treatment room air  -AH     Post SpO2 (%) 94  -AH     O2 Delivery Post Treatment room air  -AH       Row Name 05/20/25 1231          Positioning and Restraints    Pre-Treatment Position in bed  -AH     Post Treatment Position bed  -     In Bed supine;call light within reach;encouraged to call for assist;notified [facility]               User Key  (r) = Recorded By, (t) = Taken By, (c) = Cosigned By      Initials Name Provider Type    Aarti Hawkins Occupational Therapist                   Outcome Measures       Row Name 05/20/25 1234          How much help from another is currently needed...    Putting on and taking off regular lower body clothing? 4  -AH     Bathing (including washing, rinsing, and drying) 4  -AH     Toileting (which includes using toilet bed pan or urinal) 4  -AH     Putting on and taking off regular upper body clothing 4  -AH     Taking care of personal grooming (such as brushing teeth) 4  -AH     Eating meals 4  -AH     AM-PAC 6 Clicks Score (OT) 24  -       Row Name 05/20/25 1058          How much help from another person do you currently need...    Turning from your back to your side while in flat bed without using bedrails? 3  -KM     Moving from lying on back to sitting on the side of a flat bed without bedrails? 3  -KM     Moving to and from a bed to a chair (including a wheelchair)? 3  -KM     Standing up from a chair using your arms (e.g., wheelchair, bedside chair)? 3  -KM     Climbing 3-5 steps with a  railing? 2  -KM     To walk in hospital room? 3  -KM     AM-PAC 6 Clicks Score (PT) 17  -KM       Row Name 05/20/25 1234          Functional Assessment    Outcome Measure Options AM-PAC 6 Clicks Daily Activity (OT)  -               User Key  (r) = Recorded By, (t) = Taken By, (c) = Cosigned By      Initials Name Provider Type     Aarti Estrada Occupational Therapist    María Elena Oquendo, RN Registered Nurse                  Occupational Therapy Education       Title: PT OT SLP Therapies (Done)       Topic: Occupational Therapy (Done)       Point: ADL training (Done)       Learning Progress Summary            Patient Acceptance, E,TB, VU by  at 5/20/2025 1235    Comment: Role of OT                                      User Key       Initials Effective Dates Name Provider Type Discipline     06/16/21 -  Aarti Estrada Occupational Therapist OT                  OT Recommendation and Plan  Therapy Frequency (OT): evaluation only  Plan of Care Review  Plan of Care Reviewed With: patient  Progress: no change  Outcome Evaluation: Pt seen for OT evaluation today.  Pt lives at home with his son and is normally independent with all self care and mobility tasks.  Pt reports he drives.  Pt received supine in bed and was able to sit eob independently, stood independently and walked 84' with Rw and cga.  pt was noted to get soa, but his O2 sats were 92% on RA after walking.  Pt is independent with his self care tasks and has no skilled OT needs at this time.  Pt would benefit from rollator for energy conservation at home and in the community.  OT will sign off.  Plan of Care Reviewed With: patient  Outcome Evaluation: Pt seen for OT evaluation today.  Pt lives at home with his son and is normally independent with all self care and mobility tasks.  Pt reports he drives.  Pt received supine in bed and was able to sit eob independently, stood independently and walked 84' with Rw and cga.  pt was noted to get soa, but his  O2 sats were 92% on RA after walking.  Pt is independent with his self care tasks and has no skilled OT needs at this time.  Pt would benefit from rollator for energy conservation at home and in the community.  OT will sign off.     Time Calculation:   Evaluation Complexity (OT)  Review Occupational Profile/Medical/Therapy History Complexity: brief/low complexity  Assessment, Occupational Performance/Identification of Deficit Complexity: 1-3 performance deficits  Clinical Decision Making Complexity (OT): problem focused assessment/low complexity  Overall Complexity of Evaluation (OT): low complexity     Time Calculation- OT       Row Name 05/20/25 1235             Time Calculation- OT    OT Start Time 0952  -AH      OT Received On 05/20/25  -AH         Untimed Charges    OT Eval/Re-eval Minutes 45  -AH         Total Minutes    Untimed Charges Total Minutes 45  -AH       Total Minutes 45  -AH                User Key  (r) = Recorded By, (t) = Taken By, (c) = Cosigned By      Initials Name Provider Type    Aarti Hawkins Occupational Therapist                  Therapy Charges for Today       Code Description Service Date Service Provider Modifiers Qty    [id number]  OT EVAL LOW COMPLEXITY 3 5/20/2025 Aarti Estrada GO 1                    Aarti Estrada  5/20/2025

## 2025-05-20 NOTE — H&P
[facility]   HISTORY AND PHYSICAL      Name:  Lebron Eduardored   Age:  70 y.o.  Sex:  male  :  1955  MRN:  [id number]   Visit Number:  [id number]  Admission Date:  2025  Date Of Service:  25  Primary Care Physician:  Natasha Veronica MD    Chief Complaint:     Shortness of breath.    History Of Presenting Illness:      Lebron La Joya is a 70-year-old male with history of hypertension COPD, CAD status post stent, dyslipidemia was brought to the emergency room by family with symptoms of increasing shortness of breath for the last several days.  Patient does live alone and does not have home oxygen but uses nebulizers and inhalers.  Unfortunately continues to smoke.  He states that he started smoking at the age of 9.  Uses a cane to ambulate.  No history of any chest pain or fevers.    In the emergency room, he was afebrile and hemodynamically stable except for elevated blood pressure of 136/121.  He was initially requiring 2 L of nasal cannula oxygen to saturate above 90 but when removed he would drop into the upper 80s.  Serial troponins were within normal range.  proBNP was within normal range.  CMP and CBC was unremarkable.  Chest x-ray showed emphysematous lung fields without any acute infiltrate.  CT angiogram of the chest was negative for pulmonary embolism but showed emphysematous disease.  Bronchial thickening bilaterally noted.  Coronary atherosclerosis was noted.  Enlarged main pulmonary arteries indicate pulmonary hypertension.  Multiple sclerotic foci predominantly involving the endplates throughout the thoracic spine.  If history of neoplasm recommend nuclear medicine bone scan.  CT of the abdomen and pelvis with contrast showed no evidence of bowel obstruction.  Scattered colonic diverticula without evidence of diverticulitis.  Enlarged prostate noted.  Patient was given DuoNebs, IV Solu-Medrol and was subsequently admitted to the medical floor with  telemetry for management of acute COPD exacerbation.    Review Of Systems:    All systems were reviewed and negative except as mentioned in history of presenting illness, assessment and plan.    Past Medical History: Patient's  has a past medical history of Allergic rhinitis, Arthritis, Hypertension, Myocardial infarction, Psoriasis, and Skin cancer.    Past Surgical History: Patient's  has a past surgical history that includes Coronary stent placement.    Social History: Patient's  reports that he has been smoking cigarettes. He started smoking about 57 years ago. He has a 57.4 pack-year smoking history. He has never used smokeless tobacco. He reports that he does not drink alcohol and does not use drugs.    Family History:  Patient's family history includes Aneurysm in his brother; COPD in his mother; Stroke in his brother and father.     Allergies:      Patient has no known allergies.    Home Medications:    Prior to Admission Medications       Prescriptions Last Dose Informant Patient Reported? Taking?    albuterol (PROVENTIL) (2.5 MG/3ML) 0.083% nebulizer solution   No No    Use one vial by nebulization every 6 (six) hours as needed for Wheezing    albuterol (PROVENTIL) (2.5 MG/3ML) 0.083% nebulizer solution   No No    Inhale contents of 1 vial through a nebulizer every 6 (six) hours as needed for wheezing    albuterol (PROVENTIL) (2.5 MG/3ML) 0.083% nebulizer solution   No No    Inhale contents of 1 vial through a nebulizer every 6 (six) hours as needed for wheezing    albuterol (PROVENTIL) (2.5 MG/3ML) 0.083% nebulizer solution   No No    Inhale contents of 1 vial through a nebulizer Every 6 (Six) Hours As Needed for wheezing    albuterol sulfate  (90 Base) MCG/ACT inhaler   No No    Inhale 2 puffs by mouth Every 4 (Four) Hours As Needed for Wheezing or Shortness of Air.    albuterol sulfate  (90 Base) MCG/ACT inhaler   No No    Inhale 2 puffs by mouth Every 4 (Four) Hours As Needed for Wheezing  or Shortness of Air.    albuterol sulfate  (90 Base) MCG/ACT inhaler   No No    Inhale 2 puffs Every 4 (Four) Hours As Needed for wheezing    albuterol sulfate  (90 Base) MCG/ACT inhaler   No No    Inhale 2 puffs by mouth Every 4 (Four) Hours As Needed for Wheezing or Shortness of Air.    albuterol sulfate  (90 Base) MCG/ACT inhaler   No No    Inhale 2 puffs by mouth Every 4 (Four) Hours As Needed for wheezing.    albuterol sulfate  (90 Base) MCG/ACT inhaler   No No    Inhale 2 puffs Every 4 (Four) Hours As Needed for wheezing.    aspirin 81 MG EC tablet   No No    Take 1 tablet by mouth Daily.    aspirin 81 MG EC tablet   No No    Take 1 tablet by mouth Daily.    aspirin 81 MG tablet   Yes No    Take  by mouth daily.    Budeson-Glycopyrrol-Formoterol (Breztri Aerosphere) 160-9-4.8 MCG/ACT aerosol inhaler   No No    Inhale 2 puffs by mouth 2 (Two) Times a Day.    Budeson-Glycopyrrol-Formoterol (Breztri Aerosphere) 160-9-4.8 MCG/ACT aerosol inhaler   No No    Inhale 2 puffs by mouth  2 (Two) Times a Day. Rinse mouth after use    budesonide-formoterol (Symbicort) 80-4.5 MCG/ACT inhaler   No No    Inhale 2 puffs by mouth twice daily    carvedilol (COREG) 25 MG tablet   No No    Take 1 tablet by mouth 2 (Two) Times a Day.    Cholecalciferol (VITAMIN D) 2000 UNITS tablet   Yes No    Take  by mouth.    cloNIDine (CATAPRES-TTS) 0.3 MG/24HR patch   No No    Place 1 patch on the skin 1 (One) Time Per Week.    cloNIDine (CATAPRES-TTS) 0.3 MG/24HR patch   No No    Place 1 patch on the skin as directed by provider 1 (One) Time Per Week.    clopidogrel (PLAVIX) 75 MG tablet   No No    Take 1 tablet by mouth Daily.    clopidogrel (PLAVIX) 75 MG tablet   No No    Take 1 tablet by mouth Daily.    Coenzyme Q10 (CoQ10) 100 MG capsule   No No    Take 1 capsule (100 mg total) by mouth daily for 90 days.    famotidine (Heartburn Relief) 10 MG tablet   No No    Take 2 tablets by mouth Daily.     hydroCHLOROthiazide 25 MG tablet   No No    Take 1 tablet by mouth Daily.    hydroCHLOROthiazide 25 MG tablet   No No    Take 1 tablet by mouth Daily.    Influenza Vac High-Dose Quad (Fluzone High-Dose Quadrivalent) 0.7 ML suspension prefilled syringe injection   No No    Inject 0.7 mL into the appropriate muscle as directed by prescriber.    ipratropium-albuterol (COMBIVENT RESPIMAT)  MCG/ACT inhaler   No No    Inhale 1 puff 4 (Four) Times a Day As Needed for Wheezing.    lisinopril (PRINIVIL,ZESTRIL) 40 MG tablet   No No    Take 1 tablet by mouth Daily.    lisinopril (PRINIVIL,ZESTRIL) 40 MG tablet   No No    Take 1 tablet by mouth Daily.    magnesium, as, gluconate (MAGONATE) 500 (27 Mg) MG tablet   No No    Take 1 tablet by mouth Daily.    simvastatin (ZOCOR) 80 MG tablet   No No    Take 1 tablet by mouth every night at bedtime.    simvastatin (ZOCOR) 80 MG tablet   No No    Take 1 tablet by mouth Every Night.    simvastatin (ZOCOR) 80 MG tablet   No No    Take 1 tablet by mouth Every Night.    simvastatin (ZOCOR) 80 MG tablet   No No    Take 1 tablet by mouth Every Night.     ED Medications:    Medications   sodium chloride 0.9 % flush 10 mL (has no administration in time range)   methylPREDNISolone sodium succinate (SOLU-Medrol) injection 125 mg (125 mg Intravenous Given 5/19/25 1835)   ipratropium-albuterol (DUO-NEB) nebulizer solution 3 mL (3 mL Nebulization Given 5/19/25 2009)   iopamidol (ISOVUE-300) 61 % injection 100 mL (100 mL Intravenous Given 5/19/25 2113)   diatrizoate meglumine-sodium (GASTROGRAFIN) 66-10 % oral solution 30 mL (30 mL Oral Given 5/19/25 2113)   ipratropium-albuterol (DUO-NEB) nebulizer solution 3 mL (3 mL Nebulization Given 5/19/25 2153)     Vital Signs:  Temp:  [97.6 °F (36.4 °C)] 97.6 °F (36.4 °C)  Heart Rate:  [61-75] 71  Resp:  [18-22] 18  BP: (113-196)/() 114/73        05/19/25  1539   Weight: 59.4 kg (131 lb)     Body mass index is 19.92 kg/m².    Physical Exam:  "    Most recent vital Signs: /73   Pulse 71   Temp 97.6 °F (36.4 °C) (Oral)   Resp 18   Ht 172.7 cm (68\")   Wt 59.4 kg (131 lb)   SpO2 95%   BMI 19.92 kg/m²     Physical Exam  Constitutional:       General: He is not in acute distress.     Appearance: He is not ill-appearing.   HENT:      Head: Normocephalic and atraumatic.      Right Ear: External ear normal.      Left Ear: External ear normal.      Nose: Nose normal.      Mouth/Throat:      Mouth: Mucous membranes are moist.   Eyes:      Extraocular Movements: Extraocular movements intact.      Conjunctiva/sclera: Conjunctivae normal.   Cardiovascular:      Rate and Rhythm: Normal rate and regular rhythm.      Pulses: Normal pulses.      Heart sounds: Normal heart sounds. No murmur heard.  Pulmonary:      Effort: Respiratory distress present.      Breath sounds: Wheezing present. No rales.      Comments: Barrel-shaped chest.  Scattered rhonchi heard bilaterally.  Breath sounds are decreased bilaterally.  Abdominal:      General: Bowel sounds are normal.      Palpations: Abdomen is soft.      Tenderness: There is no abdominal tenderness. There is no guarding or rebound.   Musculoskeletal:         General: Normal range of motion.      Cervical back: Neck supple.      Right lower leg: No edema.      Left lower leg: No edema.   Skin:     General: Skin is dry.      Findings: No erythema or rash.   Neurological:      General: No focal deficit present.      Mental Status: He is alert and oriented to person, place, and time. Mental status is at baseline.   Psychiatric:         Mood and Affect: Mood normal.         Behavior: Behavior normal.       Laboratory data:    I have reviewed the labs done in the emergency room.    Results from last 7 days   Lab Units 05/19/25  1829   SODIUM mmol/L 137   POTASSIUM mmol/L 4.1   CHLORIDE mmol/L 98   CO2 mmol/L 27.4   BUN mg/dL 15   CREATININE mg/dL 0.79   CALCIUM mg/dL 9.3   BILIRUBIN mg/dL 0.7   ALK PHOS U/L 87   ALT " (SGPT) U/L 9   AST (SGOT) U/L 14   GLUCOSE mg/dL 89     Results from last 7 days   Lab Units 05/19/25  1829   WBC 10*3/mm3 7.12   HEMOGLOBIN g/dL 15.8   HEMATOCRIT % 44.7   PLATELETS 10*3/mm3 282         Results from last 7 days   Lab Units 05/19/25  1953 05/19/25  1829   HSTROP T ng/L 13 14     Results from last 7 days   Lab Units 05/19/25  1829   PROBNP pg/mL 421.4       Results from last 7 days   Lab Units 05/19/25  2327   COLOR UA  Yellow   GLUCOSE UA  Negative   KETONES UA  Negative   BLOOD UA  Negative   LEUKOCYTES UA  Negative   PH, URINE  7.0   BILIRUBIN UA  Negative   UROBILINOGEN UA  1.0 E.U./dL   RBC UA /HPF None Seen   WBC UA /HPF 0-2     EKG:      EKG done in the emergency room was reviewed by me.  It shows sinus rhythm at 70 bpm.  Normal axis.  No significant ST-T changes were noted.  Significant baseline artifact noted.    Radiology:    CT Angiogram Chest Pulmonary Embolism  Result Date: 5/19/2025  FINAL REPORT TECHNIQUE: null CLINICAL HISTORY: eval for PE COMPARISON: null FINDINGS: CTA chest with IV contrast. MIP images also submitted for evaluation. Comparison: None Findings: The thyroid is unremarkable. Normal caliber of the thoracic aorta. Atherosclerosis of the thoracic aorta. The heart is normal size. Coronary atherosclerosis. Enlarged pulmonary arteries may indicate pulmonary artery hypertension The mediastinum is intact. No pulmonary embolism. Emphysematous disease. Bronchial thickening bilaterally greatest involving the lung bases. Findings may represent reactive airways disease including bronchitis. No focal consolidation or large pleural effusion. Focal scarring within the left upper lobe. Degenerative changes of the thoracic spine. Multiple sclerotic foci predominantly involving the endplates throughout the thoracic spine. If history of neoplasm recommend nuclear medicine bone scan. Please see accompanying report for discussion of the abdomen and pelvis.     IMPRESSION: 1. No pulmonary  embolism. 2. Emphysematous disease. Bronchial thickening bilaterally greatest involving the lung bases. Findings may represent reactive airways disease including bronchitis. No focal consolidation or large pleural effusion. 3. Coronary atherosclerosis. 4. Enlarged main pulmonary arteries may indicate pulmonary hypertension 5. Multiple sclerotic foci predominantly involving the endplates throughout the thoracic spine. If history of neoplasm recommend nuclear medicine bone scan. Authenticated and Electronically Signed by Eddie Simms DO on 05/19/2025 10:17:58 PM    CT Abdomen Pelvis With Contrast  Result Date: 5/19/2025  FINAL REPORT TECHNIQUE: null CLINICAL HISTORY: eval for bowel obstruction COMPARISON: null FINDINGS: CT abdomen and pelvis with contrast Comparison: None Findings: Bronchial thickening within the lung bases. The stomach is nondistended. Atherosclerosis of the abdominal aorta extending into the iliac vasculature. Hypodensity within the left lobe of the liver which measures 2.3 cm. Smaller adjacent subcentimeter hypodensity. Findings likely represents hepatic cysts. The gallbladder and pancreas are unremarkable. Splenic granulomas. Adrenal glands are normal. Bilateral renal cysts. No nephrolithiasis or hydronephrosis Enlarged prostate measuring 6.0 cm in transverse diameter. Recommend correlating with PSA values. The urinary bladder is nondistended. Mucosal thickening of the urinary bladder may be related to underdistention or chronic outlet obstruction. Recommend correlating with urinary analysis to exclude cystitis. Enteric contrast extends to the distal rectum. Scattered colonic diverticula without evidence of acute diverticulitis. No bowel obstruction, pneumoperitoneum, or pneumatosis. Normal appendix. Enteric contrast within the appendix. Degenerative changes of the lumbar spine and bilateral hips. Focal sclerosis of the superior and inferior endplates of T12 and of the inferior endplate of L1.      IMPRESSION: 1. No evidence for bowel obstruction. Enteric contrast extends to the level of the distal rectum. 2. Scattered colonic diverticula without evidence of diverticulitis. 3. Enlarged prostate measures 6.0 cm in transverse diameter. Recommend correlating with PSA values. 4. Mucosal thickening of the urinary bladder may be related to underdistention or chronic outlet obstruction. Recommend correlating with urinary analysis to exclude cystitis. 5. Focal sclerosis of the superior and inferior endplates of T12 and of the inferior endplate of L1. If history of neoplasm recommend nuclear medicine bone scan. Authenticated and Electronically Signed by Eddie Simms DO on 05/19/2025 10:17:01 PM    Assessment:    Acute COPD exacerbation, POA.  Acute hypoxia, POA.  Essential hypertension.  Dyslipidemia.  BPH.    Plan:    COPD exacerbation.  - Patient will be continued on bronchodilators, budesonide, Brovana and IV Solu-Medrol.  - At this time there is no evidence of pneumonia and we will hold off on antibiotics therapy.  - Continue nasal cannula oxygen to maintain saturations above 90%.  - Strongly advised the patient to discontinue smoking and he will be placed on nicotine patch.  - He may benefit from walking oximetry prior to discharge.    Coronary artery disease.  - Continue antiplatelet agents.    Essential hypertension.  - Continue nadolol, clonidine patch, lisinopril and hydrochlorothiazide    I have discussed the patient's condition and treatment plan with his niece who is at the bedside.    Risk Assessment: Moderate  DVT Prophylaxis: Enoxaparin  Code Status: Full  Diet: Cardiac      Geovany Esquivel MD  05/20/25  01:35 EDT    Dictated utilizing Dragon dictation.

## 2025-05-20 NOTE — PLAN OF CARE
Goal Outcome Evaluation:  Plan of Care Reviewed With: patient        Progress: no change  Outcome Evaluation: Pt seen for OT evaluation today.  Pt lives at home with his son and is normally independent with all self care and mobility tasks.  Pt reports he drives.  Pt received supine in bed and was able to sit eob independently, stood independently and walked 84' with Rw and cga.  pt was noted to get soa, but his O2 sats were 92% on RA after walking.  Pt is independent with his self care tasks and has no skilled OT needs at this time.  Pt would benefit from rollator for energy conservation at home and in the community.  OT will sign off.

## 2025-05-21 LAB
ANION GAP SERPL CALCULATED.3IONS-SCNC: 6.2 MMOL/L (ref 5–15)
BUN SERPL-MCNC: 21 MG/DL (ref 8–23)
BUN/CREAT SERPL: 42 (ref 7–25)
CALCIUM SPEC-SCNC: 8.6 MG/DL (ref 8.6–10.5)
CHLORIDE SERPL-SCNC: 99 MMOL/L (ref 98–107)
CO2 SERPL-SCNC: 28.8 MMOL/L (ref 22–29)
CREAT SERPL-MCNC: 0.5 MG/DL (ref 0.76–1.27)
DEPRECATED RDW RBC AUTO: 42 FL (ref 37–54)
EGFRCR SERPLBLD CKD-EPI 2021: 109.7 ML/MIN/1.73
ERYTHROCYTE [DISTWIDTH] IN BLOOD BY AUTOMATED COUNT: 12.1 % (ref 12.3–15.4)
GLUCOSE SERPL-MCNC: 129 MG/DL (ref 65–99)
HCT VFR BLD AUTO: 41.5 % (ref 37.5–51)
HGB BLD-MCNC: 15.2 G/DL (ref 13–17.7)
MCH RBC QN AUTO: 34.2 PG (ref 26.6–33)
MCHC RBC AUTO-ENTMCNC: 36.6 G/DL (ref 31.5–35.7)
MCV RBC AUTO: 93.5 FL (ref 79–97)
PLATELET # BLD AUTO: 299 10*3/MM3 (ref 140–450)
PMV BLD AUTO: 8.6 FL (ref 6–12)
POTASSIUM SERPL-SCNC: 3.9 MMOL/L (ref 3.5–5.2)
PROCALCITONIN SERPL-MCNC: 0.02 NG/ML (ref 0–0.25)
RBC # BLD AUTO: 4.44 10*6/MM3 (ref 4.14–5.8)
SODIUM SERPL-SCNC: 134 MMOL/L (ref 136–145)
WBC NRBC COR # BLD AUTO: 14.74 10*3/MM3 (ref 3.4–10.8)

## 2025-05-21 PROCEDURE — 94799 UNLISTED PULMONARY SVC/PX: CPT

## 2025-05-21 PROCEDURE — 94664 DEMO&/EVAL PT USE INHALER: CPT

## 2025-05-21 PROCEDURE — 97116 GAIT TRAINING THERAPY: CPT

## 2025-05-21 PROCEDURE — 25010000002 ENOXAPARIN PER 10 MG: Performed by: INTERNAL MEDICINE

## 2025-05-21 PROCEDURE — 97112 NEUROMUSCULAR REEDUCATION: CPT

## 2025-05-21 PROCEDURE — 85027 COMPLETE CBC AUTOMATED: CPT | Performed by: FAMILY MEDICINE

## 2025-05-21 PROCEDURE — 80048 BASIC METABOLIC PNL TOTAL CA: CPT | Performed by: FAMILY MEDICINE

## 2025-05-21 PROCEDURE — 99232 SBSQ HOSP IP/OBS MODERATE 35: CPT | Performed by: FAMILY MEDICINE

## 2025-05-21 PROCEDURE — 97530 THERAPEUTIC ACTIVITIES: CPT

## 2025-05-21 PROCEDURE — 84145 PROCALCITONIN (PCT): CPT | Performed by: FAMILY MEDICINE

## 2025-05-21 PROCEDURE — 25010000002 METHYLPREDNISOLONE PER 40 MG: Performed by: FAMILY MEDICINE

## 2025-05-21 PROCEDURE — 94761 N-INVAS EAR/PLS OXIMETRY MLT: CPT

## 2025-05-21 PROCEDURE — 25010000002 METHYLPREDNISOLONE PER 125 MG: Performed by: INTERNAL MEDICINE

## 2025-05-21 PROCEDURE — 63710000001 REVEFENACIN 175 MCG/3ML SOLUTION: Performed by: INTERNAL MEDICINE

## 2025-05-21 RX ORDER — GUAIFENESIN/DEXTROMETHORPHAN 100-10MG/5
10 SYRUP ORAL EVERY 4 HOURS PRN
Status: DISCONTINUED | OUTPATIENT
Start: 2025-05-21 | End: 2025-05-22 | Stop reason: HOSPADM

## 2025-05-21 RX ORDER — METHYLPREDNISOLONE SODIUM SUCCINATE 40 MG/ML
40 INJECTION, POWDER, LYOPHILIZED, FOR SOLUTION INTRAMUSCULAR; INTRAVENOUS EVERY 12 HOURS
Status: DISCONTINUED | OUTPATIENT
Start: 2025-05-21 | End: 2025-05-22 | Stop reason: HOSPADM

## 2025-05-21 RX ADMIN — ARFORMOTEROL TARTRATE 15 MCG: 15 SOLUTION RESPIRATORY (INHALATION) at 07:03

## 2025-05-21 RX ADMIN — REVEFENACIN 175 MCG: 175 SOLUTION RESPIRATORY (INHALATION) at 07:03

## 2025-05-21 RX ADMIN — HYDROCHLOROTHIAZIDE 25 MG: 25 TABLET ORAL at 09:34

## 2025-05-21 RX ADMIN — SENNOSIDES AND DOCUSATE SODIUM 2 TABLET: 50; 8.6 TABLET ORAL at 17:32

## 2025-05-21 RX ADMIN — CARVEDILOL 25 MG: 25 TABLET, FILM COATED ORAL at 09:35

## 2025-05-21 RX ADMIN — BUDESONIDE 0.5 MG: 0.5 SUSPENSION RESPIRATORY (INHALATION) at 18:59

## 2025-05-21 RX ADMIN — Medication 10 ML: at 09:35

## 2025-05-21 RX ADMIN — Medication 10 ML: at 21:49

## 2025-05-21 RX ADMIN — BUDESONIDE 0.5 MG: 0.5 SUSPENSION RESPIRATORY (INHALATION) at 07:03

## 2025-05-21 RX ADMIN — GUAIFENESIN 600 MG: 600 TABLET, MULTILAYER, EXTENDED RELEASE ORAL at 09:34

## 2025-05-21 RX ADMIN — CLOPIDOGREL BISULFATE 75 MG: 75 TABLET, FILM COATED ORAL at 09:34

## 2025-05-21 RX ADMIN — SENNOSIDES AND DOCUSATE SODIUM 2 TABLET: 50; 8.6 TABLET ORAL at 21:49

## 2025-05-21 RX ADMIN — METHYLPREDNISOLONE SODIUM SUCCINATE 60 MG: 125 INJECTION INTRAMUSCULAR; INTRAVENOUS at 03:09

## 2025-05-21 RX ADMIN — ATORVASTATIN CALCIUM 10 MG: 10 TABLET, FILM COATED ORAL at 09:35

## 2025-05-21 RX ADMIN — GUAIFENESIN 600 MG: 600 TABLET, MULTILAYER, EXTENDED RELEASE ORAL at 21:49

## 2025-05-21 RX ADMIN — METHYLPREDNISOLONE SODIUM SUCCINATE 40 MG: 40 INJECTION, POWDER, FOR SOLUTION INTRAMUSCULAR; INTRAVENOUS at 21:49

## 2025-05-21 RX ADMIN — ASPIRIN 81 MG: 81 TABLET, COATED ORAL at 09:35

## 2025-05-21 RX ADMIN — METHYLPREDNISOLONE SODIUM SUCCINATE 60 MG: 125 INJECTION INTRAMUSCULAR; INTRAVENOUS at 09:34

## 2025-05-21 RX ADMIN — LISINOPRIL 40 MG: 20 TABLET ORAL at 09:35

## 2025-05-21 RX ADMIN — ENOXAPARIN SODIUM 40 MG: 100 INJECTION SUBCUTANEOUS at 21:49

## 2025-05-21 RX ADMIN — CARVEDILOL 25 MG: 25 TABLET, FILM COATED ORAL at 21:48

## 2025-05-21 RX ADMIN — ARFORMOTEROL TARTRATE 15 MCG: 15 SOLUTION RESPIRATORY (INHALATION) at 18:59

## 2025-05-21 RX ADMIN — NICOTINE 1 PATCH: 21 PATCH TRANSDERMAL at 21:49

## 2025-05-21 NOTE — PLAN OF CARE
Goal Outcome Evaluation:  Plan of Care Reviewed With: patient        Progress: improving  Outcome Evaluation: Pt agreeable to physical therapy. Performed supine <->sit mod I, sit <->stand with RW and mod I, amb with  feet without LOB or path deviations noted. Performed standing activity without LOB noted with and without UE support. Pt sitting EOB without LOB noted. Con't with PT POC and progress as tolerated

## 2025-05-21 NOTE — THERAPY TREATMENT NOTE
Patient Name: Lebron Miller  : 1955    MRN: [id number]                              Today's Date: 2025       Admit Date: 2025    Visit Dx: No diagnosis found.  Patient Active Problem List   Diagnosis    Atopic rhinitis    Chronic coronary artery disease    Arthritis    Disc disorder of cervical region    Hyperlipidemia    Hypertension    Psoriasis    Tobacco abuse    Vitamin D deficiency    Moderate single current episode of major depressive disorder    Screening PSA (prostate specific antigen)    Encounter for Medicare annual wellness exam    Acute exacerbation of chronic obstructive pulmonary disease (COPD)     Past Medical History:   Diagnosis Date    Allergic rhinitis     Arthritis     Hypertension     Myocardial infarction     Psoriasis     Skin cancer      Past Surgical History:   Procedure Laterality Date    CORONARY STENT PLACEMENT        General Information       Row Name 25 1147          Physical Therapy Time and Intention    Document Type therapy note (daily note)  -CC     Mode of Treatment physical therapy  -CC       Row Name 25 1147          General Information    Patient Profile Reviewed yes  -CC     Existing Precautions/Restrictions fall  -CC       Row Name 25 1147          Safety Issues/Impairments Affecting Functional Mobility    Safety Issues Affecting Function (Mobility) positioning of assistive device;safety precautions follow-through/compliance  -CC     Impairments Affecting Function (Mobility) endurance/activity tolerance;shortness of breath  -CC               User Key  (r) = Recorded By, (t) = Taken By, (c) = Cosigned By      Initials Name Provider Type    CC Jillian Rivera PTA Physical Therapist Assistant                   Mobility       Row Name 25 1148          Gait/Stairs (Locomotion)    Patient was able to Ambulate yes  -CC     Distance in Feet (Gait) 425  -CC               User Key  (r) = Recorded By, (t) = Taken By, (c) = Cosigned By       Initials Name Provider Type    Jillian Hoyos PTA Physical Therapist Assistant                   Obj/Interventions    No documentation.                  Goals/Plan    No documentation.                  Clinical Impression       Row Name 05/21/25 1149          Pain    Pretreatment Pain Rating 0/10 - no pain  -CC     Posttreatment Pain Rating 0/10 - no pain  -CC       Row Name 05/21/25 1149          Plan of Care Review    Plan of Care Reviewed With patient  -CC     Progress improving  -CC     Outcome Evaluation Pt agreeable to physical therapy. Performed supine <->sit mod I, sit <->stand with RW and mod I, amb with  feet without LOB or path deviations noted. Performed standing activity without LOB noted with and without UE support. Pt sitting EOB without LOB noted. Con't with PT POC and progress as tolerated  -CC       Row Name 05/21/25 1149          Positioning and Restraints    Pre-Treatment Position in bed  -CC     Post Treatment Position bed  -CC     In Bed notified nsg;supine;fowlers;call light within reach;encouraged to call for assist  -CC               User Key  (r) = Recorded By, (t) = Taken By, (c) = Cosigned By      Initials Name Provider Type    Jillian Hoyos PTA Physical Therapist Assistant                   Outcome Measures       Row Name 05/21/25 1154 05/21/25 0800       How much help from another person do you currently need...    Turning from your back to your side while in flat bed without using bedrails? 4  -CC 4  -RS    Moving from lying on back to sitting on the side of a flat bed without bedrails? 4  -CC 4  -RS    Moving to and from a bed to a chair (including a wheelchair)? 4  -CC 3  -RS    Standing up from a chair using your arms (e.g., wheelchair, bedside chair)? 4  -CC 4  -RS    Climbing 3-5 steps with a railing? 3  -CC 3  -RS    To walk in hospital room? 4  -CC 3  -RS    AM-PAC 6 Clicks Score (PT) 23  -CC 21  -RS    Highest Level of Mobility Goal Walk 25 Feet or More-7   - Walk 10 Steps or More-6  -      Row Name 05/21/25 1154          Functional Assessment    Outcome Measure Options AM-PAC 6 Clicks Basic Mobility (PT)  -CC               User Key  (r) = Recorded By, (t) = Taken By, (c) = Cosigned By      Initials Name Provider Type    CC Jillian Rivera PTA Physical Therapist Assistant    RS Jalen Luna, RN Registered Nurse                                 Physical Therapy Education       Title: PT OT SLP Therapies (In Progress)       Topic: Physical Therapy (In Progress)       Point: Mobility training (Done)       Learning Progress Summary            Patient Acceptance, E,TB, VU by  at 5/21/2025 1155    Acceptance, E, VU by MS at 5/20/2025 1341    Comment: importance of mobility                      Point: Home exercise program (Done)       Learning Progress Summary            Patient Acceptance, E, VU by MS at 5/20/2025 1341    Comment: importance of mobility                      Point: Body mechanics (Not Started)       Learner Progress:  Not documented in this visit.              Point: Precautions (Not Started)       Learner Progress:  Not documented in this visit.                              User Key       Initials Effective Dates Name Provider Type Discipline     06/16/21 -  Jillian Rivera PTA Physical Therapist Assistant PT    MS 08/22/23 -  Naif Bentley, JADEN Physical Therapist PT                  PT Recommendation and Plan     Progress: improving  Outcome Evaluation: Pt agreeable to physical therapy. Performed supine <->sit mod I, sit <->stand with RW and mod I, amb with  feet without LOB or path deviations noted. Performed standing activity without LOB noted with and without UE support. Pt sitting EOB without LOB noted. Con't with PT POC and progress as tolerated     Time Calculation:         PT Charges       Row Name 05/21/25 7927             Time Calculation    Start Time 0940  -CC      Stop Time 1018  -CC      Time Calculation (min) 38 min  -CC       PT Received On 05/21/25  -CC      PT Goal Re-Cert Due Date 05/30/25  -CC         Timed Charges    65299 -  PT Neuromuscular Reeducation Minutes 10  -CC      01588 - Gait Training Minutes  20  -CC      73336 - PT Therapeutic Activity Minutes 8  -CC         Total Minutes    Timed Charges Total Minutes 38  -CC       Total Minutes 38  -CC                User Key  (r) = Recorded By, (t) = Taken By, (c) = Cosigned By      Initials Name Provider Type    CC Jillian Rivera, PTA Physical Therapist Assistant                  Therapy Charges for Today       Code Description Service Date Service Provider Modifiers Qty    26408216599 HC PT NEUROMUSC RE EDUCATION EA 15 MIN 5/21/2025 Jillian Rivera, MOISÉS GP 1    52199827331 HC GAIT TRAINING EA 15 MIN 5/21/2025 Jillian Rivera, PTA GP 1    72200270728 HC PT THERAPEUTIC ACT EA 15 MIN 5/21/2025 Jillian Rivera, MOISÉS GP 1            PT G-Codes  Outcome Measure Options: AM-PAC 6 Clicks Basic Mobility (PT)  AM-PAC 6 Clicks Score (PT): 23  AM-PAC 6 Clicks Score (OT): 24       Jillian Rivera PTA  5/21/2025

## 2025-05-21 NOTE — PROGRESS NOTES
"[facility]   Clinical Nutrition Assessment    Patient Name: Lebron Miller  YOB: 1955  MRN: [id number]  Admission date: 5/19/2025  Reason for Encounter: MST 2-3 or Nursing Admission Screen    Clinical Nutrition Assessment     Subjective    Trending Encounter/Subjective Information     5/21: Patient w/ MST score of 2 - unsure of recent weight loss. Patient w/ underweight BMI for age.      Assessment    H&P and Current Problems      H&P  Past Medical History:   Diagnosis Date    Allergic rhinitis     Arthritis     Hypertension     Myocardial infarction     Psoriasis     Skin cancer       Past Surgical History:   Procedure Laterality Date    CORONARY STENT PLACEMENT        Current Problems   Admission Diagnosis:  Acute exacerbation of chronic obstructive pulmonary disease (COPD) [J44.1]    Problem List:    Acute exacerbation of chronic obstructive pulmonary disease (COPD)      Other Applicable Nutrition Information:   None     Anthropometrics      BMI, Height, Weight Estimated body mass index is 19.92 kg/m² as calculated from the following:    Height as of this encounter: 172.7 cm (68\").    Weight as of this encounter: 59.4 kg (131 lb).    Weight Method: Stated       Trending Weight Changes No significant changes       Weight History  Wt Readings from Last 20 Encounters:   05/19/25 1746 59.4 kg (131 lb)   06/24/24 1828 64.4 kg (142 lb)   03/14/23 1609 66.7 kg (147 lb)   08/30/22 1531 68 kg (150 lb)   03/18/22 1602 68.9 kg (152 lb)   07/26/21 1515 69.4 kg (153 lb)   01/25/21 1558 72.6 kg (160 lb)   07/23/20 0949 72.1 kg (159 lb)   10/11/19 1418 73.5 kg (162 lb)   04/05/19 1515 74.8 kg (165 lb)   09/19/18 1537 74.4 kg (164 lb)   04/24/18 1433 77.7 kg (171 lb 6 oz)   02/28/18 1438 76.7 kg (169 lb)   08/31/17 1443 76.2 kg (168 lb)   06/15/17 1515 75.8 kg (167 lb 2 oz)   05/04/17 1106 76.7 kg (169 lb)   12/16/16 1329 77.1 kg (170 lb)   08/17/16 1339 75.2 kg (165 lb 12.8 oz)   05/17/16 1427 76.3 kg (168 lb " "2 oz)   01/12/16 0959 76.7 kg (169 lb 2.2 oz)        Labs      Results from last 7 days   Lab Units 05/21/25  0541 05/20/25  0530 05/19/25  1829   SODIUM mmol/L 134* 134* 137   POTASSIUM mmol/L 3.9 4.3 4.1   GLUCOSE mg/dL 129* 219* 89   BUN mg/dL 21 21 15   CREATININE mg/dL 0.50* 0.79 0.79   CALCIUM mg/dL 8.6 8.8 9.3   TOTAL PROTEIN g/dL  --   --  6.8   ALBUMIN g/dL  --   --  3.8   BILIRUBIN mg/dL  --   --  0.7   ALK PHOS U/L  --   --  87   AST (SGOT) U/L  --   --  14   ALT (SGPT) U/L  --   --  9   PLATELETS 10*3/mm3 299 285 282   HEMOGLOBIN g/dL 15.2 15.9 15.8   HEMATOCRIT % 41.5 43.8 44.7   PROBNP pg/mL  --   --  421.4     No results found for: \"HGBA1C\"       Medications       Scheduled Medications arformoterol, 15 mcg, Nebulization, BID - RT  aspirin, 81 mg, Oral, Daily  atorvastatin, 10 mg, Oral, Daily  budesonide, 0.5 mg, Nebulization, BID - RT  carvedilol, 25 mg, Oral, BID  cloNIDine, 1 patch, Transdermal, Weekly  clopidogrel, 75 mg, Oral, Daily  enoxaparin sodium, 40 mg, Subcutaneous, Nightly  guaiFENesin, 600 mg, Oral, Q12H  hydroCHLOROthiazide, 25 mg, Oral, Daily  lisinopril, 40 mg, Oral, Daily  methylPREDNISolone sodium succinate, 60 mg, Intravenous, Q8H  nicotine, 1 patch, Transdermal, Nightly  revefenacin, 175 mcg, Nebulization, Daily - RT  senna-docusate sodium, 2 tablet, Oral, BID  sodium chloride, 10 mL, Intravenous, Q12H        Infusions Pharmacy to Dose enoxaparin (LOVENOX),         PRN Medications   acetaminophen **OR** acetaminophen **OR** acetaminophen    senna-docusate sodium **AND** polyethylene glycol **AND** bisacodyl **AND** bisacodyl    ipratropium-albuterol    melatonin    nicotine polacrilex    ondansetron    Pharmacy to Dose enoxaparin (LOVENOX)    sodium chloride    sodium chloride    sodium chloride     Physical Findings      Chewing/Swallowing  Teeth Status: Mouth/Teeth WDL: .WDL except, teeth Teeth Symptoms: tooth/teeth missing  Chewing/Swallowing Issues: No issues identified at this " time   Edema                            Bowel Function  Stool Output  Stool Unmeasured Occurrence: 1 (05/20/25 0911)  Last Bowel Movement: 05/21/25   I/Os  Intake & Output (last 3 days)         05/18 0701  05/19 0700 05/19 0701  05/20 0700 05/20 0701  05/21 0700 05/21 0701  05/22 0700    P.O.   420 600    Total Intake(mL/kg)   420 (7.1) 600 (10.1)    Urine (mL/kg/hr)   625 (0.4) 350 (1.1)    Stool   0     Total Output   625 350    Net   -205 +250            Stool Unmeasured Occurrence   1 x            Lines, Drains, Airways, & Wounds       Active LDAs       Name Placement date Placement time Site Days Last dressing change    Peripheral IV 05/19/25 1828 20 G Right Antecubital 05/19/25 1828  Antecubital  1                         Estimated Needs      Energy Requirements    Weight for Calculation 59.4   Method for Estimation  30 kcals/kg and 35 kcals/kg   Daily Needs (kcal/day) 2167-5266   Protein Requirements    Weight for Calculation 59.4   Method for Estimation 1.0-1.2 gm/kg   Daily Needs (g/day) 59-71   Fluid Requirements     Method for Estimation 1 mL/kcal    Daily Needs (mL/day) 2079     Current Nutrition Orders & Evaluation of Intake      Oral Nutrition     Food Allergies None    Current PO Diet Diet: Cardiac; Healthy Heart (2-3 Na+); Fluid Consistency: Thin (IDDSI 0)   Oral Nutrition Supplement None    PO Evaluation     Trending % PO Intake 5/21: 100% x 3 meals      Enteral Nutrition     Current EN Order Patient isn't on Tube Feeding  Patient doesn't have any tube feeding modular orders     EN Route     EN Tolerance     EN Observation         Parenteral Nutrition     Current TPN Order    TPN Route    Lipids (mL/%/frequency)     MVI Frequency     Trace Element Frequency     Total # Days on TPN    TPN Observation       Assessment & Plan   Nutrition Diagnosis and Goals       Nutrition Diagnosis 1 Underweight r/t COPD as evidenced by BMI of 19.92      Nutrition Diagnosis 2          Goal(s) Maintain PO Intake ,  Meets Estimated Needs , Accepts Oral Nutrition Supplement, and Maintain Weight     Nutrition Intervention and Prescription       Intervention  Start oral nutrition supplement and Continue to monitor for plan of care      Diet Prescription HH    Supplement Prescription Boost Original BID      Enteral Prescription        TPN Prescription        Education Provided       Monitoring/Evaluation       Monitor/Evaluation Per Protocol, PO Intake, Oral Nutrition Supplement Intake, Pertinent Labs, Weight, Skin Status, GI Status, Symptoms, Swallow Function, and Hemodynamic Stability      RD Follow-Up Encounter 1-2 days     Electronically signed by:  Mame Richter RD  05/21/25 12:20 EDT

## 2025-05-21 NOTE — CASE MANAGEMENT/SOCIAL WORK
Case Management/Social Work    Patient Name:  Lebron Miller  YOB: 1955  MRN: [id number]  Admit Date:  5/19/2025        08:31 EDT   Discharge Plan       Row Name 05/21/25 0830       Plan    Plan Patient plans to return home once medically ready. Needs a rollator and possibly O2, no preference for DME company.                        Electronically signed by:  Trae Watson RN  05/21/25 08:31 EDT

## 2025-05-21 NOTE — PROGRESS NOTES
[facility]    Name:  Lebron Miller   Age:  70 y.o.  Sex:  male  :  1955  MRN:  [id number]   Visit Number:  [id number]  Admission Date:  2025  Date Of Service:  25  Primary Care Physician:  Natasha Veronica MD     LOS: 1 day :    Chief Complaint:      Shortness of breath.     Subjective:    Patient was seen examined bedside today.  Patient sitting up on the side of the bed and appears comfortable.  He just walked with physical therapy.  Appears mildly tachypneic while talking, but he denies shortness of breath.  He still has cough but feels more able to cough stuff up and clear his lungs.  He remains on 2 L nasal cannula.  He denies any acute complaints otherwise today.  Other vital stable and afebrile.    Hospital Course:    Lebron Miller is a 70-year-old male with history of hypertension COPD, CAD status post stent, dyslipidemia was brought to the emergency room by family with symptoms of increasing shortness of breath for the last several days.  Patient does live alone and does not have home oxygen but uses nebulizers and inhalers.  Unfortunately continues to smoke.  He states that he started smoking at the age of 9.  Uses a cane to ambulate.  No history of any chest pain or fevers.     In the emergency room, he was afebrile and hemodynamically stable except for elevated blood pressure of 136/121.  He was initially requiring 2 L of nasal cannula oxygen to saturate above 90 but when removed he would drop into the upper 80s.  Serial troponins were within normal range.  proBNP was within normal range.  CMP and CBC was unremarkable.  Chest x-ray showed emphysematous lung fields without any acute infiltrate.  CT angiogram of the chest was negative for pulmonary embolism but showed emphysematous disease.  Bronchial thickening bilaterally noted.  Coronary atherosclerosis was noted.  Enlarged main pulmonary arteries indicate pulmonary  hypertension.  Multiple sclerotic foci predominantly involving the endplates throughout the thoracic spine.  If history of neoplasm recommend nuclear medicine bone scan.  CT of the abdomen and pelvis with contrast showed no evidence of bowel obstruction.  Scattered colonic diverticula without evidence of diverticulitis.  Enlarged prostate noted.  Patient was given DuoNebs, IV Solu-Medrol and was subsequently admitted to the medical floor with telemetry for management of acute COPD exacerbation.    Review of Systems:     All systems were reviewed and negative except as mentioned in subjective, assessment and plan.    Vital Signs:    Temp:  [97.7 °F (36.5 °C)-98.6 °F (37 °C)] 97.9 °F (36.6 °C)  Heart Rate:  [59-99] 59  Resp:  [16-18] 18  BP: (104-138)/(64-90) 138/77    Intake and output:    I/O last 3 completed shifts:  In: 420 [P.O.:420]  Out: 625 [Urine:625]  I/O this shift:  In: -   Out: 350 [Urine:350]    Physical Examination:    General Appearance:  Alert and cooperative.  No acute distress.   Head:  Atraumatic and normocephalic.   Eyes: Conjunctivae and sclerae normal, no icterus. No pallor.   Throat: No oral lesions, no thrush, oral mucosa moist.   Neck: Supple, trachea midline, no thyromegaly.   Lungs:   Breath sounds heard bilaterally equally.  Decreased air movement bilaterally.  Bilateral expiratory wheezing.  No Pleural rub or bronchial breathing.  Mildly tachypneic while talking and with exertion.  On supplemental oxygen.   Heart:  Normal S1 and S2, no murmur, no gallop, no rub. No JVD.   Abdomen:   Normal bowel sounds, no masses, no organomegaly. Soft, nontender, nondistended, no rebound tenderness.   Extremities: Supple, no edema, no cyanosis, no clubbing.   Skin: No bleeding or rash.   Neurologic: Alert and oriented x 3. No facial asymmetry. Moves all four limbs. No tremors.      Laboratory results:    Results from last 7 days   Lab Units 05/21/25  0541 05/20/25  0530 05/19/25  1829   SODIUM mmol/L  "134* 134* 137   POTASSIUM mmol/L 3.9 4.3 4.1   CHLORIDE mmol/L 99 98 98   CO2 mmol/L 28.8 27.0 27.4   BUN mg/dL 21 21 15   CREATININE mg/dL 0.50* 0.79 0.79   CALCIUM mg/dL 8.6 8.8 9.3   BILIRUBIN mg/dL  --   --  0.7   ALK PHOS U/L  --   --  87   ALT (SGPT) U/L  --   --  9   AST (SGOT) U/L  --   --  14   GLUCOSE mg/dL 129* 219* 89     Results from last 7 days   Lab Units 05/21/25  0541 05/20/25  0530 05/19/25  1829   WBC 10*3/mm3 14.74* 7.23 7.12   HEMOGLOBIN g/dL 15.2 15.9 15.8   HEMATOCRIT % 41.5 43.8 44.7   PLATELETS 10*3/mm3 299 285 282         Results from last 7 days   Lab Units 05/19/25  1953 05/19/25  1829   HSTROP T ng/L 13 14         No results for input(s): \"PHART\", \"CJR6AKR\", \"PO2ART\", \"GLL4DRF\", \"BASEEXCESS\" in the last 8760 hours.   I have reviewed the patient's laboratory results.    Radiology results:    XR Chest 1 View  Result Date: 5/20/2025  PROCEDURE: XR CHEST 1 VW-    HISTORY: SOA Triage Protocol  COMPARISON: June 2024.  FINDINGS: The heart is normal in size. The mediastinum is unremarkable. There is emphysematous change. There is no acute infiltrate. There is no pneumothorax. There are no acute osseous abnormalities.      Impression: No acute cardiopulmonary process.    Images were reviewed, interpreted, and dictated by Dr. Kristen Ayon MD Transcribed by Zita Rogers PA-C.  This report was signed and finalized on 5/20/2025 9:45 AM by Kristen Ayon MD.      CT Angiogram Chest Pulmonary Embolism  Result Date: 5/19/2025  FINAL REPORT TECHNIQUE: null CLINICAL HISTORY: eval for PE COMPARISON: null FINDINGS: CTA chest with IV contrast. MIP images also submitted for evaluation. Comparison: None Findings: The thyroid is unremarkable. Normal caliber of the thoracic aorta. Atherosclerosis of the thoracic aorta. The heart is normal size. Coronary atherosclerosis. Enlarged pulmonary arteries may indicate pulmonary artery hypertension The mediastinum is intact. No pulmonary embolism. Emphysematous " disease. Bronchial thickening bilaterally greatest involving the lung bases. Findings may represent reactive airways disease including bronchitis. No focal consolidation or large pleural effusion. Focal scarring within the left upper lobe. Degenerative changes of the thoracic spine. Multiple sclerotic foci predominantly involving the endplates throughout the thoracic spine. If history of neoplasm recommend nuclear medicine bone scan. Please see accompanying report for discussion of the abdomen and pelvis.     Impression: IMPRESSION: 1. No pulmonary embolism. 2. Emphysematous disease. Bronchial thickening bilaterally greatest involving the lung bases. Findings may represent reactive airways disease including bronchitis. No focal consolidation or large pleural effusion. 3. Coronary atherosclerosis. 4. Enlarged main pulmonary arteries may indicate pulmonary hypertension 5. Multiple sclerotic foci predominantly involving the endplates throughout the thoracic spine. If history of neoplasm recommend nuclear medicine bone scan. Authenticated and Electronically Signed by Eddie Simms DO on 05/19/2025 10:17:58 PM    CT Abdomen Pelvis With Contrast  Result Date: 5/19/2025  FINAL REPORT TECHNIQUE: null CLINICAL HISTORY: eval for bowel obstruction COMPARISON: null FINDINGS: CT abdomen and pelvis with contrast Comparison: None Findings: Bronchial thickening within the lung bases. The stomach is nondistended. Atherosclerosis of the abdominal aorta extending into the iliac vasculature. Hypodensity within the left lobe of the liver which measures 2.3 cm. Smaller adjacent subcentimeter hypodensity. Findings likely represents hepatic cysts. The gallbladder and pancreas are unremarkable. Splenic granulomas. Adrenal glands are normal. Bilateral renal cysts. No nephrolithiasis or hydronephrosis Enlarged prostate measuring 6.0 cm in transverse diameter. Recommend correlating with PSA values. The urinary bladder is nondistended. Mucosal  thickening of the urinary bladder may be related to underdistention or chronic outlet obstruction. Recommend correlating with urinary analysis to exclude cystitis. Enteric contrast extends to the distal rectum. Scattered colonic diverticula without evidence of acute diverticulitis. No bowel obstruction, pneumoperitoneum, or pneumatosis. Normal appendix. Enteric contrast within the appendix. Degenerative changes of the lumbar spine and bilateral hips. Focal sclerosis of the superior and inferior endplates of T12 and of the inferior endplate of L1.     Impression: IMPRESSION: 1. No evidence for bowel obstruction. Enteric contrast extends to the level of the distal rectum. 2. Scattered colonic diverticula without evidence of diverticulitis. 3. Enlarged prostate measures 6.0 cm in transverse diameter. Recommend correlating with PSA values. 4. Mucosal thickening of the urinary bladder may be related to underdistention or chronic outlet obstruction. Recommend correlating with urinary analysis to exclude cystitis. 5. Focal sclerosis of the superior and inferior endplates of T12 and of the inferior endplate of L1. If history of neoplasm recommend nuclear medicine bone scan. Authenticated and Electronically Signed by Eddie Simms DO on 05/19/2025 10:17:01 PM    I have reviewed the patient's radiology reports.    Medication Review:     I have reviewed the patient's active and prn medications.     Problem List:      Acute exacerbation of chronic obstructive pulmonary disease (COPD)      Assessment:    Acute COPD exacerbation, POA.  Acute hypoxia, POA.  Rhinovirus infection  Essential hypertension.  Dyslipidemia.  BPH.  Sclerotic lesions of of T and L-spine    Plan:    COPD exacerbation  Rhinovirus infection  Hypoxia  -Patient will be continued on bronchodilators, budesonide, Brovana and IV Solu-Medrol.  -At this time there is no evidence of pneumonia and we will hold off on antibiotics therapy.  -Leukocytosis likely due to  steroids, Pro-Aldo remains negative  -Continue nasal cannula oxygen to maintain saturations above 90%.  -Strongly advised the patient to discontinue smoking and placed on nicotine patch.  -Will taper steroids down to 40 mg Solu-Medrol twice daily  -Walking oximetry prior to discharge.  -Will need follow-up in DSM clinic and with pulmonology.     Coronary artery disease.  - Continue antiplatelet agents.     Essential hypertension.  - Continue nadolol, clonidine patch, lisinopril and hydrochlorothiazide    Enlarged prostate  - Seen on CT scan, measures 6 cm in transverse diameter  - Discussed with the patient  - Recommend follow-up with urology as an outpatient    Sclerotic lesions in spine   -Found on imaging as focal sclerosis of the superior and inferior endplates of T12 and of the inferior endplate of L1. If history of neoplasm recommend nuclear medicine bone scan.   - Will need follow-up as outpatient for further diagnostics  -Discussed with the patient    Debility  - Patient has a mobility limitation that significantly impairs their ability to participate in one or more mobility-related activities of daily living.  The patient is able to safely use the walker.  The functional mobility deficit can be sufficiently resolved by use of a walker.    I have reviewed the copied text and it is accurate as of 5/21/2025      DVT Prophylaxis: Enoxaparin  Code Status: Full  Diet: Cardiac  Discharge Plan: Likely needs 1 to 2 days in the hospital    Eloy Teixeira MD  05/21/25  08:15 EDT    Dictated utilizing Dragon dictation.

## 2025-05-21 NOTE — PLAN OF CARE
Problem: Fall Injury Risk  Goal: Absence of Fall and Fall-Related Injury  Outcome: Progressing  Intervention: Promote Injury-Free Environment  Recent Flowsheet Documentation  Taken 5/21/2025 0600 by Tova Rizvi RN  Safety Promotion/Fall Prevention: safety round/check completed  Taken 5/21/2025 0400 by Tova Rizvi RN  Safety Promotion/Fall Prevention: safety round/check completed  Taken 5/21/2025 0200 by Tova Rizvi RN  Safety Promotion/Fall Prevention: safety round/check completed  Taken 5/21/2025 0000 by Tova Rizvi RN  Safety Promotion/Fall Prevention: safety round/check completed  Taken 5/20/2025 2000 by Tova Rizvi RN  Safety Promotion/Fall Prevention: safety round/check completed     Problem: Adult Inpatient Plan of Care  Goal: Plan of Care Review  Outcome: Progressing  Goal: Patient-Specific Goal (Individualized)  Outcome: Progressing  Goal: Absence of Hospital-Acquired Illness or Injury  Outcome: Progressing  Intervention: Identify and Manage Fall Risk  Recent Flowsheet Documentation  Taken 5/21/2025 0600 by Tova Rizvi RN  Safety Promotion/Fall Prevention: safety round/check completed  Taken 5/21/2025 0400 by Tova Rizvi RN  Safety Promotion/Fall Prevention: safety round/check completed  Taken 5/21/2025 0200 by Tova Rizvi RN  Safety Promotion/Fall Prevention: safety round/check completed  Taken 5/21/2025 0000 by Tova Rizvi RN  Safety Promotion/Fall Prevention: safety round/check completed  Taken 5/20/2025 2000 by Tova Rizvi RN  Safety Promotion/Fall Prevention: safety round/check completed  Intervention: Prevent Skin Injury  Recent Flowsheet Documentation  Taken 5/21/2025 0600 by Tova Rizvi RN  Body Position: side-lying  Taken 5/21/2025 0400 by Tova Rizvi RN  Body Position: supine  Taken 5/21/2025 0200 by Tova Rizvi RN  Body Position: position maintained  Taken 5/21/2025 0000 by Tova Rizvi RN  Body Position:   side-lying   right  Taken  5/20/2025 2000 by Tova Rizvi, RN  Body Position: sitting up in bed  Goal: Optimal Comfort and Wellbeing  Outcome: Progressing  Goal: Readiness for Transition of Care  Outcome: Progressing   Goal Outcome Evaluation:

## 2025-05-21 NOTE — PLAN OF CARE
Problem: Fall Injury Risk  Goal: Absence of Fall and Fall-Related Injury  Outcome: Progressing  Intervention: Identify and Manage Contributors  Recent Flowsheet Documentation  Taken 5/21/2025 0800 by Jalen Luna RN  Medication Review/Management: medications reviewed  Intervention: Promote Injury-Free Environment  Recent Flowsheet Documentation  Taken 5/21/2025 1600 by Jalen Luna RN  Safety Promotion/Fall Prevention: safety round/check completed  Taken 5/21/2025 1400 by Jalen Luna RN  Safety Promotion/Fall Prevention: safety round/check completed  Taken 5/21/2025 1200 by Jalen Luna RN  Safety Promotion/Fall Prevention: safety round/check completed  Taken 5/21/2025 1000 by Jalen Luna RN  Safety Promotion/Fall Prevention: safety round/check completed  Taken 5/21/2025 0800 by Jalen Luna RN  Safety Promotion/Fall Prevention: safety round/check completed     Problem: Adult Inpatient Plan of Care  Goal: Plan of Care Review  Outcome: Progressing  Goal: Patient-Specific Goal (Individualized)  Outcome: Progressing  Goal: Absence of Hospital-Acquired Illness or Injury  Outcome: Progressing  Intervention: Identify and Manage Fall Risk  Recent Flowsheet Documentation  Taken 5/21/2025 1600 by Jalen Luna RN  Safety Promotion/Fall Prevention: safety round/check completed  Taken 5/21/2025 1400 by Jalen Luna RN  Safety Promotion/Fall Prevention: safety round/check completed  Taken 5/21/2025 1200 by Jalen Luna RN  Safety Promotion/Fall Prevention: safety round/check completed  Taken 5/21/2025 1000 by Jalen Luna RN  Safety Promotion/Fall Prevention: safety round/check completed  Taken 5/21/2025 0800 by Jalen Luna RN  Safety Promotion/Fall Prevention: safety round/check completed  Intervention: Prevent Skin Injury  Recent Flowsheet Documentation  Taken 5/21/2025 1600 by Jalen Luna RN  Body Position:   dangle, side of bed   weight shifting   position changed independently  Taken 5/21/2025  1400 by Jalen Luna RN  Body Position:   supine   legs elevated   weight shifting   position changed independently  Taken 5/21/2025 1200 by Jalen Luna RN  Body Position:   dangle, side of bed   position changed independently  Taken 5/21/2025 1000 by Jalen Luna RN  Body Position:   weight shifting   supine   legs elevated   position changed independently  Taken 5/21/2025 0800 by Jalen Luna RN  Body Position:   sitting up in bed   weight shifting   position changed independently  Skin Protection: incontinence pads utilized  Intervention: Prevent and Manage VTE (Venous Thromboembolism) Risk  Recent Flowsheet Documentation  Taken 5/21/2025 0800 by Jlaen Luna RN  VTE Prevention/Management: (see mar) other (see comments)  Goal: Optimal Comfort and Wellbeing  Outcome: Progressing  Intervention: Provide Person-Centered Care  Recent Flowsheet Documentation  Taken 5/21/2025 0800 by Jalen Luna RN  Trust Relationship/Rapport:   care explained   choices provided   emotional support provided   empathic listening provided   questions answered   questions encouraged   reassurance provided   thoughts/feelings acknowledged  Goal: Readiness for Transition of Care  Outcome: Progressing   Goal Outcome Evaluation:

## 2025-05-22 ENCOUNTER — READMISSION MANAGEMENT (OUTPATIENT)
Dept: CALL CENTER | Facility: HOSPITAL | Age: 70
End: 2025-05-22
Payer: MEDICARE

## 2025-05-22 VITALS
HEART RATE: 66 BPM | RESPIRATION RATE: 18 BRPM | WEIGHT: 131 LBS | TEMPERATURE: 97.4 F | HEIGHT: 68 IN | OXYGEN SATURATION: 93 % | BODY MASS INDEX: 19.85 KG/M2 | SYSTOLIC BLOOD PRESSURE: 166 MMHG | DIASTOLIC BLOOD PRESSURE: 91 MMHG

## 2025-05-22 LAB
ANION GAP SERPL CALCULATED.3IONS-SCNC: 11 MMOL/L (ref 5–15)
BUN SERPL-MCNC: 26 MG/DL (ref 8–23)
BUN/CREAT SERPL: 44.1 (ref 7–25)
CALCIUM SPEC-SCNC: 8.3 MG/DL (ref 8.6–10.5)
CHLORIDE SERPL-SCNC: 98 MMOL/L (ref 98–107)
CO2 SERPL-SCNC: 25 MMOL/L (ref 22–29)
CREAT SERPL-MCNC: 0.59 MG/DL (ref 0.76–1.27)
DEPRECATED RDW RBC AUTO: 43.6 FL (ref 37–54)
EGFRCR SERPLBLD CKD-EPI 2021: 104.4 ML/MIN/1.73
ERYTHROCYTE [DISTWIDTH] IN BLOOD BY AUTOMATED COUNT: 12.3 % (ref 12.3–15.4)
GLUCOSE SERPL-MCNC: 115 MG/DL (ref 65–99)
HCT VFR BLD AUTO: 43.4 % (ref 37.5–51)
HGB BLD-MCNC: 15.4 G/DL (ref 13–17.7)
MCH RBC QN AUTO: 33.9 PG (ref 26.6–33)
MCHC RBC AUTO-ENTMCNC: 35.5 G/DL (ref 31.5–35.7)
MCV RBC AUTO: 95.6 FL (ref 79–97)
PLATELET # BLD AUTO: 310 10*3/MM3 (ref 140–450)
PMV BLD AUTO: 8.7 FL (ref 6–12)
POTASSIUM SERPL-SCNC: 4 MMOL/L (ref 3.5–5.2)
RBC # BLD AUTO: 4.54 10*6/MM3 (ref 4.14–5.8)
SODIUM SERPL-SCNC: 134 MMOL/L (ref 136–145)
WBC NRBC COR # BLD AUTO: 14.31 10*3/MM3 (ref 3.4–10.8)

## 2025-05-22 PROCEDURE — 80048 BASIC METABOLIC PNL TOTAL CA: CPT | Performed by: FAMILY MEDICINE

## 2025-05-22 PROCEDURE — 63710000001 REVEFENACIN 175 MCG/3ML SOLUTION: Performed by: INTERNAL MEDICINE

## 2025-05-22 PROCEDURE — 25010000002 METHYLPREDNISOLONE PER 40 MG: Performed by: FAMILY MEDICINE

## 2025-05-22 PROCEDURE — 94799 UNLISTED PULMONARY SVC/PX: CPT

## 2025-05-22 PROCEDURE — 94618 PULMONARY STRESS TESTING: CPT

## 2025-05-22 PROCEDURE — 85027 COMPLETE CBC AUTOMATED: CPT | Performed by: FAMILY MEDICINE

## 2025-05-22 PROCEDURE — 94761 N-INVAS EAR/PLS OXIMETRY MLT: CPT

## 2025-05-22 PROCEDURE — 99239 HOSP IP/OBS DSCHRG MGMT >30: CPT | Performed by: FAMILY MEDICINE

## 2025-05-22 RX ORDER — NICOTINE 21 MG/24HR
1 PATCH, TRANSDERMAL 24 HOURS TRANSDERMAL NIGHTLY
Qty: 14 PATCH | Refills: 0 | Status: SHIPPED | OUTPATIENT
Start: 2025-05-22 | End: 2025-06-05

## 2025-05-22 RX ORDER — GUAIFENESIN/DEXTROMETHORPHAN 100-10MG/5
10 SYRUP ORAL EVERY 4 HOURS PRN
Qty: 237 ML | Refills: 0 | Status: SHIPPED | OUTPATIENT
Start: 2025-05-22 | End: 2025-06-01

## 2025-05-22 RX ORDER — PREDNISONE 10 MG/1
TABLET ORAL
Qty: 21 TABLET | Refills: 0 | Status: SHIPPED | OUTPATIENT
Start: 2025-05-22 | End: 2025-05-31

## 2025-05-22 RX ORDER — GUAIFENESIN 600 MG/1
600 TABLET, EXTENDED RELEASE ORAL EVERY 12 HOURS SCHEDULED
Qty: 20 TABLET | Refills: 0 | Status: SHIPPED | OUTPATIENT
Start: 2025-05-22 | End: 2025-06-01

## 2025-05-22 RX ADMIN — ARFORMOTEROL TARTRATE 15 MCG: 15 SOLUTION RESPIRATORY (INHALATION) at 07:03

## 2025-05-22 RX ADMIN — BUDESONIDE 0.5 MG: 0.5 SUSPENSION RESPIRATORY (INHALATION) at 07:03

## 2025-05-22 RX ADMIN — ATORVASTATIN CALCIUM 10 MG: 10 TABLET, FILM COATED ORAL at 09:31

## 2025-05-22 RX ADMIN — LISINOPRIL 40 MG: 20 TABLET ORAL at 09:31

## 2025-05-22 RX ADMIN — Medication 10 ML: at 09:32

## 2025-05-22 RX ADMIN — REVEFENACIN 175 MCG: 175 SOLUTION RESPIRATORY (INHALATION) at 07:04

## 2025-05-22 RX ADMIN — CARVEDILOL 25 MG: 25 TABLET, FILM COATED ORAL at 09:31

## 2025-05-22 RX ADMIN — METHYLPREDNISOLONE SODIUM SUCCINATE 40 MG: 40 INJECTION, POWDER, FOR SOLUTION INTRAMUSCULAR; INTRAVENOUS at 09:31

## 2025-05-22 RX ADMIN — GUAIFENESIN 600 MG: 600 TABLET, MULTILAYER, EXTENDED RELEASE ORAL at 09:31

## 2025-05-22 RX ADMIN — CLOPIDOGREL BISULFATE 75 MG: 75 TABLET, FILM COATED ORAL at 09:31

## 2025-05-22 RX ADMIN — HYDROCHLOROTHIAZIDE 25 MG: 25 TABLET ORAL at 09:31

## 2025-05-22 RX ADMIN — ASPIRIN 81 MG: 81 TABLET, COATED ORAL at 09:31

## 2025-05-22 NOTE — CASE MANAGEMENT/SOCIAL WORK
Case Management/Social Work    Patient Name:  Lebron Miller  YOB: 1955  MRN: [id number]  Admit Date:  5/19/2025        08:53 EDT   Discharge Plan       Row Name 05/22/25 0852       Plan    Plan Patient will require oxygen. Called in to [facility] with a request for additional extension tubing as patient has to use a [id number] that he rented currently.                        Electronically signed by:  Trae Watson RN  05/22/25 08:53 EDT

## 2025-05-22 NOTE — PROGRESS NOTES
Exercise Oximetry    Patient Name:Lebron Miller   MRN: [id number]   Date: 05/22/25             ROOM AIR BASELINE   SpO2%                   88   Heart Rate                 72   Blood Pressure      EXERCISE ON ROOM AIR SpO2% EXERCISE ON O2 @ LPM SpO2%   1 MINUTE  1 MINUTE                 2 90   2 MINUTES  2 MINUTES 90   3 MINUTES  3 MINUTES 89   4 MINUTES  4 MINUTES 88   5 MINUTES  5 MINUTES                  3 90   6 MINUTES  6 MINUTES 90              Distance Walked   Distance Walked   Dyspnea (Zeny Scale)   Dyspnea (Zeny Scale)             5   Fatigue (Zeny Scale)   Fatigue (Zeny Scale)       7   SpO2% Post Exercise   SpO2% Post Exercise          92     HR Post Exercise   HR Post Exercise        71   Time to Recovery   Time to Recovery     Comments: pt requires nc 2lpm @ REST AND 3LPM WHILE AMBULATING.

## 2025-05-22 NOTE — OUTREACH NOTE
Prep Survey      Flowsheet Row Responses   [facility] patient discharged from? [facility]   Is LACE score < 7 ? No   Eligibility Readm Mgmt   Discharge diagnosis Acute exacerbation of chronic obstructive pulmonary disease (COPD)   Does the patient have one of the following disease processes/diagnoses(primary or secondary)? COPD   Does the patient have Home health ordered? Yes   What is the Home health agency?  [facility]   Is there a DME ordered? Yes   What DME was ordered? walker, O2 2L NC with exertion   Prep survey completed? Yes            Liset CHOW - Registered Nurse

## 2025-05-22 NOTE — PLAN OF CARE
Goal Outcome Evaluation:         Pt on 2L NC. Denies pain or discomfort. Pt rested during the night. Will continue plan of care.

## 2025-05-22 NOTE — CASE MANAGEMENT/SOCIAL WORK
Case Management Discharge Note      Final Note: Plans to DC home    Provided Post Acute Provider List?: N/A  Provided Post Acute Provider Quality & Resource List?: N/A    Selected Continued Care - Admitted Since 5/19/2025       Destination    No services have been selected for the patient.                Durable Medical Equipment Coordination complete.      Service Provider Services Address Phone Fax Patient Preferred    [facility] Oxygen Equipment and Accessories, Durable Medical Equipment [address]  [address] [address] [phone] --              Dialysis/Infusion    No services have been selected for the patient.                Home Medical Care Coordination complete.      Service Provider Services Address Phone Fax Patient Preferred    [facility] [address], [address], [facility] [address] [phone] --              Therapy    No services have been selected for the patient.                Community Resources    No services have been selected for the patient.                Community & DME    No services have been selected for the patient.                    Transportation Services  Private: Car    Final Discharge Disposition Code: 06 - home with home health care

## 2025-05-22 NOTE — CASE MANAGEMENT/SOCIAL WORK
Case Management Discharge Note      Final Note: Plans to DC home    Provided Post Acute Provider List?: N/A  Provided Post Acute Provider Quality & Resource List?: N/A    Selected Continued Care - Admitted Since 5/19/2025       Destination    No services have been selected for the patient.                Durable Medical Equipment Coordination complete.      Service Provider Services Address Phone Fax Patient Preferred    [facility] Durable Medical Equipment [address] [place] [address] [place] [address] [phone] --              Dialysis/Infusion    No services have been selected for the patient.                Home Medical Care    No services have been selected for the patient.                Therapy    No services have been selected for the patient.                Community Resources    No services have been selected for the patient.                Community & DME    No services have been selected for the patient.                    Transportation Services  Private: Car    Final Discharge Disposition Code: 01 - home or self-care

## 2025-05-22 NOTE — DISCHARGE INSTRUCTIONS
- Use oxygen at home as directed  -Avoid smoking as discussed, use nicotine patch to help you quit smoking.  -Continue steroids as prescribed until finished  - Follow-up with lung doctor as outpatient as directed  -Follow-up with PCP in 2 to 3 days for recheck and continued care.  -You will need follow-up with urology for enlarged prostate seen on imaging.  Referral sent to Dr. Shelton.  -You will need further follow-ups regarding spine lesions found on imaging for further workup as an outpatient, please discuss with your PCP on follow-up.

## 2025-05-22 NOTE — DISCHARGE SUMMARY
[facility]   DIS[facility]GE SUMMARY      Name:  Lebron Miller   Age:  70 y.o.  Sex:  male  :  1955  MRN:  [id number]   Visit Number:  [id number]    Admission Date:  2025  Date of Discharge:  2025  Primary Care Physician:  Natasha Veronica MD    Important issues to note:    -Patient was discharged on 2 L of supplemental oxygen at rest and 3 L while ambulating.  -Discussed extensively and counseled on smoking cessation, nicotine patch ordered.  -Discharged on steroids taper with prednisone  -Follow-up with pulmonology and [facility]  -Follow-up with PCP.  Patient to discuss further regarding spine lesions found on imaging with PCP.  -Follow-up with urology regarding enlarged prostate.    Discharge Diagnoses:     Acute COPD exacerbation, POA.  Acute respiratory failure with hypoxia, secondary to #1 and 3, POA.  Rhinovirus infection  Essential hypertension.  Dyslipidemia.  BPH.  Sclerotic lesions of of T and L-spine    Problem List:     Active Hospital Problems    Diagnosis  POA    **Acute exacerbation of chronic obstructive pulmonary disease (COPD) [J44.1]  Yes      Resolved Hospital Problems   No resolved problems to display.     Presenting Problem:    Chief Complaint   Patient presents with    Shortness of Breath      Consults:     Consulting Physician(s)                     None              Procedures Performed:        History of presenting illness/Hospital Course:    Lebron Miller is a 70-year-old male with history of hypertension COPD, CAD status post stent, dyslipidemia was brought to the emergency room by family with symptoms of increasing shortness of breath for the last several days.  Patient does live alone and does not have home oxygen but uses nebulizers and inhalers.  Unfortunately continues to smoke.  He states that he started smoking at the age of 9.  Uses a cane to ambulate.  No history of any chest pain or fevers.     In the emergency room, he was afebrile  and hemodynamically stable except for elevated blood pressure of 136/121.  He was initially requiring 2 L of nasal cannula oxygen to saturate above 90 but when removed he would drop into the upper 80s.  Serial troponins were within normal range.  proBNP was within normal range.  CMP and CBC was unremarkable.  Chest x-ray showed emphysematous lung fields without any acute infiltrate.  CT angiogram of the chest was negative for pulmonary embolism but showed emphysematous disease.  Bronchial thickening bilaterally noted.  Coronary atherosclerosis was noted.  Enlarged main pulmonary arteries indicate pulmonary hypertension.  Multiple sclerotic foci predominantly involving the endplates throughout the thoracic spine.  If history of neoplasm recommend nuclear medicine bone scan.  CT of the abdomen and pelvis with contrast showed no evidence of bowel obstruction.  Scattered colonic diverticula without evidence of diverticulitis.  Enlarged prostate noted.  Patient was given DuoNebs, IV Solu-Medrol and was subsequently admitted to the medical floor with telemetry for management of acute COPD exacerbation.    COPD exacerbation  Rhinovirus infection  Acute respiratory failure with hypoxia  -Patient will be continued on bronchodilators, budesonide, Brovana and IV Solu-Medrol.  -At this time there is no evidence of pneumonia and we will hold off on antibiotics therapy.  -Leukocytosis likely due to steroids, Pro-Aldo remains negative  -Continue nasal cannula oxygen to maintain saturations above 90%.  -Strongly advised the patient to discontinue smoking and placed on nicotine patch.  -Walking oximetry done and pt required nc 2lpm @ REST AND 3LPM WHILE AMBULATING.  Home oxygen ordered.  -Taper steroid down, then discharged on prednisone taper  -Will need follow-up in [facility] and with pulmonology.     Coronary artery disease.  - Continue antiplatelet agents.     Essential hypertension.  - Continue nadolol, clonidine patch,  lisinopril and hydrochlorothiazide     Enlarged prostate  - Seen on CT scan, measures 6 cm in transverse diameter  - Discussed with the patient  - Recommend follow-up with urology as an outpatient     Sclerotic lesions in spine   -Found on imaging as focal sclerosis of the superior and inferior endplates of T12 and of the inferior endplate of L1. If history of neoplasm recommend nuclear medicine bone scan.   - Will need follow-up as outpatient for further diagnostics  -Discussed with the patient     Debility  - Patient has a mobility limitation that significantly impairs their ability to participate in one or more mobility-related activities of daily living.  The patient is able to safely use the walker.  The functional mobility deficit can be sufficiently resolved by use of a walker.     Patient was seen and examined on the day of discharge.  Patient sitting up comfortably in bed.  Patient speaks in complete sentences with no difficulty.  Denies any shortness of breath or pain.  Reports improvement overall and feeling ready to go home.  Cough has been improving.  Denies any other acute complaints today.  Patient ambulatory without difficulty.  Patient tolerating p.o. well.  Walk oximetry was done and showed patient requires 2 L of oxygen at rest and 3 L while ambulating. Home oxygen and Home health ordered.  Patient reports that his son will help him at home also.     Patient instructed on management and plan in details.  Discussed with patient enlarged prostate and spinal lesions, patient instructed on following up with urology as outpatient and to discuss further with his PCP.  Patient will need follow-up with [facility] in pulmonology.  Discussed steroid taper. Patient to follow-up with PCP in 2 to 3 days for recheck and continued care. Clear return precautions discussed.  Patient verbalized understanding and agreeable to plan.  All questions were answered to the patient's satisfaction. Patient has reached maximum  medical improvement of inpatient hospital stay. Patient is discharged in stable condition.    Vital Signs:    Temp:  [97.4 °F (36.3 °C)-98 °F (36.7 °C)] 97.4 °F (36.3 °C)  Heart Rate:  [62-69] 66  Resp:  [18] 18  BP: (114-166)/(64-91) 166/91    Physical Exam:    General Appearance:  Alert and cooperative.  No acute distress.   Head:  Atraumatic and normocephalic.   Eyes: Conjunctivae and sclerae normal, no icterus. No pallor.   Ears:  Ears with no abnormalities noted.   Throat: No oral lesions, no thrush, oral mucosa moist.   Neck: Supple, trachea midline, no thyromegaly.   Back:   No tenderness to palpation.   Lungs:   Breath sounds heard bilaterally equally.  No crackles.  Improved expiratory wheezing.  Speaks in complete sentences with no difficulty.  No Pleural rub or bronchial breathing.  Unlabored.  Nontachypneic.  All supplemental oxygen.   Heart:  Normal S1 and S2, no murmur, no gallop, no rub. No JVD.   Abdomen:   Normal bowel sounds, no masses, no organomegaly. Soft, nontender, nondistended, no rebound tenderness.   Extremities: Supple, no edema, no cyanosis, no clubbing.   Pulses: Pulses palpable bilaterally.   Skin: No bleeding or rash.   Neurologic: Alert and oriented x 3. No facial asymmetry. Moves all four limbs. No tremors.     Pertinent Lab Results:     Results from last 7 days   Lab Units 05/22/25  0603 05/21/25  0541 05/20/25  0530 05/19/25  1829   SODIUM mmol/L 134* 134* 134* 137   POTASSIUM mmol/L 4.0 3.9 4.3 4.1   CHLORIDE mmol/L 98 99 98 98   CO2 mmol/L 25.0 28.8 27.0 27.4   BUN mg/dL 26* 21 21 15   CREATININE mg/dL 0.59* 0.50* 0.79 0.79   CALCIUM mg/dL 8.3* 8.6 8.8 9.3   BILIRUBIN mg/dL  --   --   --  0.7   ALK PHOS U/L  --   --   --  87   ALT (SGPT) U/L  --   --   --  9   AST (SGOT) U/L  --   --   --  14   GLUCOSE mg/dL 115* 129* 219* 89     Results from last 7 days   Lab Units 05/22/25  0603 05/21/25  0541 05/20/25  0530   WBC 10*3/mm3 14.31* 14.74* 7.23   HEMOGLOBIN g/dL 15.4 15.2 15.9    HEMATOCRIT % 43.4 41.5 43.8   PLATELETS 10*3/mm3 310 299 285         Results from last 7 days   Lab Units 05/19/25 1953 05/19/25  1829   HSTROP T ng/L 13 14     Results from last 7 days   Lab Units 05/19/25  1829   PROBNP pg/mL 421.4                       Pertinent Radiology Results:    Imaging Results (All)       Procedure Component Value Units Date/Time    XR Chest 1 View [[id number]] Collected: 05/20/25 0918     Updated: 05/20/25 0948    Narrative:      PROCEDURE: XR CHEST 1 VW-        HISTORY: [facility] Triage Protocol     COMPARISON: June 2024.     FINDINGS: The heart is normal in size. The mediastinum is unremarkable.  There is emphysematous change. There is no acute infiltrate. There is no  pneumothorax. There are no acute osseous abnormalities.       Impression:      No acute cardiopulmonary process.           Images were reviewed, interpreted, and dictated by Dr. Kristen Ayon MD  Transcribed by Zita Rogers PA-C.     This report was signed and finalized on 5/20/2025 9:45 AM by Kristen Ayon MD.       CT Angiogram Chest Pulmonary Embolism [[id number]] Collected: 05/19/25 2217     Updated: 05/19/25 2219    Narrative:      FINAL REPORT    TECHNIQUE:  null    CLINICAL HISTORY:  eval for PE    COMPARISON:  null    FINDINGS:  CTA chest with IV contrast. MIP images also submitted for evaluation.    Comparison: None    Findings:    The thyroid is unremarkable.    Normal caliber of the thoracic aorta. Atherosclerosis of the thoracic aorta.    The heart is normal size. Coronary atherosclerosis.    Enlarged pulmonary arteries may indicate pulmonary artery hypertension    The mediastinum is intact.    No pulmonary embolism.    Emphysematous disease. Bronchial thickening bilaterally greatest involving the lung bases. Findings may represent reactive airways disease including bronchitis. No focal consolidation or large pleural effusion.    Focal scarring within the left upper lobe.    Degenerative changes of the thoracic  spine. Multiple sclerotic foci predominantly involving the endplates throughout the thoracic spine. If history of neoplasm recommend nuclear medicine bone scan.    Please see accompanying report for discussion of the abdomen and pelvis.      Impression:      IMPRESSION:    1. No pulmonary embolism.    2. Emphysematous disease. Bronchial thickening bilaterally greatest involving the lung bases. Findings may represent reactive airways disease including bronchitis. No focal consolidation or large pleural effusion.    3. Coronary atherosclerosis.    4. Enlarged main pulmonary arteries may indicate pulmonary hypertension    5. Multiple sclerotic foci predominantly involving the endplates throughout the thoracic spine. If history of neoplasm recommend nuclear medicine bone scan.    Authenticated and Electronically Signed by Eddie Simms DO on  05/19/2025 10:17:58 PM    CT Abdomen Pelvis With Contrast [[id number]] Collected: 05/19/25 2217     Updated: 05/19/25 2218    Narrative:      FINAL REPORT    TECHNIQUE:  null    CLINICAL HISTORY:  eval for bowel obstruction    COMPARISON:  null    FINDINGS:  CT abdomen and pelvis with contrast    Comparison: None    Findings:    Bronchial thickening within the lung bases.    The stomach is nondistended.    Atherosclerosis of the abdominal aorta extending into the iliac vasculature.    Hypodensity within the left lobe of the liver which measures 2.3 cm. Smaller adjacent subcentimeter hypodensity. Findings likely represents hepatic cysts.    The gallbladder and pancreas are unremarkable. Splenic granulomas. Adrenal glands are normal.    Bilateral renal cysts. No nephrolithiasis or hydronephrosis    Enlarged prostate measuring 6.0 cm in transverse diameter. Recommend correlating with PSA values. The urinary bladder is nondistended. Mucosal thickening of the urinary bladder may be related to underdistention or chronic outlet obstruction. Recommend   correlating with urinary analysis to  exclude cystitis.    Enteric contrast extends to the distal rectum.    Scattered colonic diverticula without evidence of acute diverticulitis.    No bowel obstruction, pneumoperitoneum, or pneumatosis.    Normal appendix. Enteric contrast within the appendix.    Degenerative changes of the lumbar spine and bilateral hips.    Focal sclerosis of the superior and inferior endplates of T12 and of the inferior endplate of L1.      Impression:      IMPRESSION:    1. No evidence for bowel obstruction. Enteric contrast extends to the level of the distal rectum.    2. Scattered colonic diverticula without evidence of diverticulitis.    3. Enlarged prostate measures 6.0 cm in transverse diameter. Recommend correlating with PSA values.    4. Mucosal thickening of the urinary bladder may be related to underdistention or chronic outlet obstruction. Recommend correlating with urinary analysis to exclude cystitis.    5. Focal sclerosis of the superior and inferior endplates of T12 and of the inferior endplate of L1. If history of neoplasm recommend nuclear medicine bone scan.    Authenticated and Electronically Signed by Eddie Simms DO on  05/19/2025 10:17:01 PM            Echo:      Condition on Discharge:      Stable.    Code status during the hospital stay:    Code Status and Medical Interventions: CPR (Attempt to Resuscitate); Full Support   Ordered at: 05/20/25 0204     Code Status (Patient has no pulse and is not breathing):    CPR (Attempt to Resuscitate)     Medical Interventions (Patient has pulse or is breathing):    Full Support     Discharge Disposition:    Home-Health Care [facility]    Discharge Medications:       Discharge Medications        New Medications        Instructions Start Date   guaiFENesin 600 MG 12 hr tablet  Commonly known as: MUCINEX   600 mg, Oral, Every 12 Hours Scheduled      guaiFENesin-dextromethorphan 100-10 MG/5ML syrup  Commonly known as: ROBITUSSIN DM   10 mL, Oral, Every 4 Hours PRN      nicotine  21 MG/24HR patch  Commonly known as: NICODERM CQ   1 patch, Transdermal, Nightly      predniSONE 10 MG tablet  Commonly known as: DELTASONE   Take 4 tablets by mouth Daily for 3 days, THEN 2 tablets Daily for 3 days, THEN 1 tablet Daily for 3 days.   Start Date: May 22, 2025            Changes to Medications        Instructions Start Date   albuterol sulfate  (90 Base) MCG/ACT inhaler  Commonly known as: PROVENTIL HFA;VENTOLIN HFA;PROAIR HFA  What changed: Another medication with the same name was removed. Continue taking this medication, and follow the directions you see here.   Inhale 2 puffs Every 4 (Four) Hours As Needed for wheezing.      albuterol (2.5 MG/3ML) 0.083% nebulizer solution  Commonly known as: PROVENTIL  What changed: Another medication with the same name was removed. Continue taking this medication, and follow the directions you see here.   Inhale contents of 1 vial through a nebulizer Every 6 (Six) Hours As Needed for wheezing      aspirin 81 MG EC tablet  What changed: Another medication with the same name was removed. Continue taking this medication, and follow the directions you see here.   81 mg, Oral, Daily      Breztri Aerosphere 160-9-4.8 MCG/ACT aerosol inhaler  Generic drug: Budeson-Glycopyrrol-Formoterol  What changed: Another medication with the same name was removed. Continue taking this medication, and follow the directions you see here.   Inhale 2 puffs by mouth  2 (Two) Times a Day. Rinse mouth after use      cloNIDine 0.3 MG/24HR patch  Commonly known as: CATAPRES-TTS  What changed: Another medication with the same name was removed. Continue taking this medication, and follow the directions you see here.   Place 1 patch on the skin 1 (One) Time Per Week.      clopidogrel 75 MG tablet  Commonly known as: PLAVIX  What changed: Another medication with the same name was removed. Continue taking this medication, and follow the directions you see here.   75 mg, Oral, Daily       hydroCHLOROthiazide 25 MG tablet  What changed: Another medication with the same name was removed. Continue taking this medication, and follow the directions you see here.   25 mg, Oral, Daily      lisinopril 40 MG tablet  Commonly known as: PRINIVIL,ZESTRIL  What changed: Another medication with the same name was removed. Continue taking this medication, and follow the directions you see here.   40 mg, Oral, Daily      simvastatin 80 MG tablet  Commonly known as: ZOCOR  What changed: Another medication with the same name was removed. Continue taking this medication, and follow the directions you see here.   80 mg, Oral, Nightly             Continue These Medications        Instructions Start Date   carvedilol 25 MG tablet  Commonly known as: COREG   25 mg, Oral, 2 Times Daily      Combivent Respimat  MCG/ACT inhaler  Generic drug: ipratropium-albuterol   1 puff, Inhalation, 4 Times Daily PRN      CoQ10 100 MG capsule   Take 1 capsule (100 mg total) by mouth daily for 90 days.      famotidine 10 MG tablet  Commonly known as: Heartburn Relief   20 mg, Oral, Daily      magnesium (as) gluconate 500 (27 Mg) MG tablet  Commonly known as: MAGONATE   500 mg, Oral, Daily      Symbicort 80-4.5 MCG/ACT inhaler  Generic drug: budesonide-formoterol   Inhale 2 puffs by mouth twice daily      Vitamin D 50 MCG (2000 UT) tablet   2,000 Units, Oral, Daily             ASK your doctor about these medications        Instructions Start Date   Fluzone High-Dose Quadrivalent 0.7 ML suspension prefilled syringe injection  Generic drug: Influenza Vac High-Dose Quad   0.7 mL, Intramuscular             Discharge Diet:   Cardiac    Activity at Discharge:   As tolerated    Follow-up Appointments:    Additional Instructions for the Follow-ups that You Need to Schedule       Ambulatory Referral to Disease State Management   As directed      To dept:  [facility] [[id number]]   What program(s) are you referring for?: COPD    Follow-up needed: Yes        Ambulatory Referral to Home Health   As directed      Face to Face Visit Date: 5/22/2025   Follow-up provider for Plan of Care?: I treated the patient in an acute care facility and will not continue treatment after discharge.   Follow-up provider: JAMES VERONICA [[id number]]   Reason/Clinical Findings: Debility, respiratory failure, COPD   Describe mobility limitations that make leaving home difficult: Debility, respiratory failure, COPD   Nursing/Therapeutic Services Requested: Skilled Nursing Physical Therapy Occupational Therapy   Skilled nursing orders: Cardiopulmonary assessments O2 instruction COPD management   PT orders: Gait Training Transfer training Strengthening   Weight Bearing Status: As Tolerated   Occupational orders: Activities of daily living Strengthening   Frequency: 1 Week 1               Contact information for follow-up providers       James Veronica MD Follow up in 3 day(s).    Specialty: Family Medicine  Why: @4pm with Gualberto  Contact information:  [address] [address]  [phone]               [facility] Follow up on 6/3/2025.    Specialty: Pharmacy  Why: @1:30PM  Contact information:  [address]  [facility] [address] [phone]             Enid Cruz MD Follow up in 1 week(s).    Specialties: Pulmonary Disease, Sleep Medicine  Contact information:  [address]  [facility] [address]  [phone]               Finn Shelton MD Follow up in 2 week(s).    Specialty: Urology  Contact information:  [address]  [place] [facility] [address]  [phone]                       Contact information for after-discharge care       Durable Medical Equipment       [facility] .    Services: Oxygen Equipment and Accessories, Durable Medical Equipment  Contact information:  [address]  [facility]  [address]  [phone]                                 Future Appointments   Date Time Provider Department Center   6/3/2025  1:30 PM Caroline Kilpatrick APRN  [facility]     Test Results Pending at Discharge:    Pending Results       None                 Eloy Teixeira MD  05/22/25  09:19 EDT    Time: I spent 34 minutes on this discharge activity which included: face-to-face encounter with the patient, reviewing the data in the system, coordination of the care with the nursing staff as well as consultants, documentation, and entering orders.     Dictated utilizing Dragon dictation.

## 2025-05-28 NOTE — CASE MANAGEMENT/SOCIAL WORK
Case Management Discharge Note      Final Note: pt was non admit for [facility] as he declined services    Provided Post Acute Provider List?: N/A  Provided Post Acute Provider Quality & Resource List?: N/A    Selected Continued Care - Discharged on 5/22/2025 Admission date: 5/19/2025 - Discharge disposition: Home-Health Care Svc      Destination    No services have been selected for the patient.                Durable Medical Equipment Coordination complete.      Service Provider Services Address Phone Fax Patient Preferred    [facility] Oxygen Equipment and Accessories, Durable Medical Equipment [address] [address] [phone] --              Dialysis/Infusion    No services have been selected for the patient.                Home Medical Care Coordination complete.      Service Provider Services Address Phone Fax Patient Preferred    [facility] [address], [address] [address] [phone] --              Therapy    No services have been selected for the patient.                Community Resources    No services have been selected for the patient.                Community & DME    No services have been selected for the patient.                    Transportation Services  Private: Car    Final Discharge Disposition Code: 01 - home or self-care

## 2025-05-29 ENCOUNTER — READMISSION MANAGEMENT (OUTPATIENT)
Dept: CALL CENTER | Facility: HOSPITAL | Age: 70
End: 2025-05-29
Payer: MEDICARE

## 2025-05-29 ENCOUNTER — TELEPHONE (OUTPATIENT)
Dept: PULMONOLOGY | Facility: CLINIC | Age: 70
End: 2025-05-29
Payer: MEDICARE

## 2025-05-29 NOTE — TELEPHONE ENCOUNTER
Provider: [facility]    Caller: Lebron Miller    Relationship to Patient: Self    Phone Number: [phone]    Reason for Call: PATIENT IS REQUESTING A CALL FROM THE CLINICAL TEAM TO DISCUSS HIS TEST FOR HIS APPOINTMENT WITH [facility] ON 6/3/25. HE HAS SOME CONCERNS BECAUSE HE IS VERY WEAK SO WANTS TO DISCUSS THIS WITH CLINICAL STAFF. PATIENT DOESN'T HAVE A VOICEMAIL TO LEAVE A MESSAGE, REQUESTING A CALL BACK AS SOON AS POSSIBLE IN CASE HE NEEDS TO CANCEL THE APPOINTMENT IF HE IS TO WEAK TO DO THE TEST.     When was the patient last seen: PATIENT IS SCHEDULED 6/3/25.

## 2025-05-29 NOTE — TELEPHONE ENCOUNTER
Informed patient that he has an appointment 6/3/25 at [facility].  Informed patient that he was referred as a hospital follow-up.  Informed him that Caroline will see him and go over medications, inhalers, and oxygen needs with him at time of visit.  Patient states concern with walk test and ability - due to lethargy.  Informed patient that Caroline will assess if he even needs that done at time of visit.  Patient states understanding and is agreeable to keeping office visit.

## 2025-05-29 NOTE — OUTREACH NOTE
COPD/PN Week 1 Survey      Flowsheet Row Responses   [facility] patient discharged from? [facility]   Does the patient have one of the following disease processes/diagnoses(primary or secondary)? COPD   Week 1 attempt successful? Yes   Call start time 1029   Call end time 1035   Discharge diagnosis Acute exacerbation of chronic obstructive pulmonary disease (COPD)   Meds reviewed with patient/caregiver? Yes   Is the patient having any side effects they believe may be caused by any medication additions or changes? No   Does the patient have all medications ordered at discharge? Yes   Is the patient taking all medications as directed (includes completed medication regime)? Yes   Does the patient have a primary care provider?  Yes   Does the patient have an appointment with their PCP or specialist within 7 days of discharge? Yes   Comments regarding PCP PCP follow up 6/2/25   Has the patient kept scheduled appointments due by today? N/A   What is the Home health agency?  [facility]   Has home health visited the patient within 72 hours of discharge? Yes   Home health comments Patient states  has been in the home but he does not feel he needs their services.   Pulse Ox monitoring Intermittent   Pulse Ox device source Patient   O2 Sat comments O2 sats 90's on 2L   O2 Sat: education provided Sat levels, When to seek care, Monitoring frequency   Psychosocial issues? No   Did the patient receive a copy of their discharge instructions? Yes   Nursing interventions Reviewed instructions with patient   What is the patient's perception of their health status since discharge? Improving   Nursing Interventions Nurse provided patient education   Are the patient's immunizations up to date?  Yes   Nursing interventions Educated on importance of maintaining up to date immunizations as advised by provider   If the patient is a current smoker, are they able to teach back resources for cessation? Smoking cessation medications    Is the patient/caregiver able to teach back the hierarchy of who to call/visit for symptoms/problems? PCP, Specialist, Home health nurse, Urgent Care, ED, 911 Yes   Is the patient able to teach back COPD zones? Yes   Nursing interventions Education provided on various zones   Patient reports what zone on this call? Yellow Zone   Yellow Zone More breathless than usual, I have less energy for my daily activities   Yellow interventions Continue taking daily medications, Use oxygen as prescribed, Get plenty of rest, Call provider immediately if symptoms don't improve: they may indicate that an adjustment in medication or oxygen therapy is needed   Week 1 call completed? Yes   Is the patient interested in additional calls from an ambulatory ? No   Would this patient benefit from a Referral to [facility] Social Work? No   Wrap up additional comments Patient reports he improving slowly.   Call end time 1035            Shelley HARTMANN - Registered Nurse

## 2025-06-03 ENCOUNTER — DISEASE STATE MANAGEMENT VISIT (OUTPATIENT)
Dept: PHARMACY | Facility: HOSPITAL | Age: 70
End: 2025-06-03
Payer: MEDICARE

## 2025-06-03 VITALS
HEIGHT: 68 IN | WEIGHT: 125 LBS | OXYGEN SATURATION: 96 % | SYSTOLIC BLOOD PRESSURE: 98 MMHG | HEART RATE: 78 BPM | DIASTOLIC BLOOD PRESSURE: 60 MMHG | BODY MASS INDEX: 18.94 KG/M2

## 2025-06-03 DIAGNOSIS — J96.11 CHRONIC RESPIRATORY FAILURE WITH HYPOXIA: ICD-10-CM

## 2025-06-03 DIAGNOSIS — R06.02 SHORTNESS OF BREATH: Primary | ICD-10-CM

## 2025-06-03 DIAGNOSIS — R68.81 EARLY SATIETY: ICD-10-CM

## 2025-06-03 DIAGNOSIS — F17.210 CIGARETTE NICOTINE DEPENDENCE WITHOUT COMPLICATION: ICD-10-CM

## 2025-06-03 DIAGNOSIS — J44.9 CHRONIC OBSTRUCTIVE PULMONARY DISEASE, UNSPECIFIED COPD TYPE: ICD-10-CM

## 2025-06-03 PROCEDURE — 99406 BEHAV CHNG SMOKING 3-10 MIN: CPT

## 2025-06-03 RX ORDER — ERGOCALCIFEROL 1.25 MG/1
50000 CAPSULE, LIQUID FILLED ORAL
COMMUNITY

## 2025-06-03 RX ORDER — TURM/GING/BOS/YUC/WIL/CHAM/HOR 100-100 MG
TABLET ORAL
COMMUNITY

## 2025-06-03 NOTE — PROGRESS NOTES
"     New Pulmonary Patient Office Visit      Patient Name: Lebron Miller    Referring Physician: Eloy Teixeira MD    Chief Complaint:  COPD      History of Present Illness: Lebron Miller is a 70 y.o. male who is here today to establish care with Pulmonary for COPD following recent hospitalization for management of acute respiratory failure with hypoxia secondary to acute COPD exacerbation and rhinovirus infection.  During admission, he was treated with steroids, bronchodilators, and supplemental oxygen. Since admission, he notes that he is feeling improved and symptoms are not baseline.  He is on Breztri and uses his short acting beta agonist a couple of times per day.  He notes that he has been arranged to have further work up of spine lesions & enlarged prostate noted during admission, and says that he also has a pending GI evaluation to discuss issues he has been having with constipation.    Duration: Suggested \"a few years back\", diagnosed during ER visit in 2020 for, has had exertional dyspnea for several years which has been gradually worsening over time  Severity: Moderate to severe dyspnea  Timing: Daily  Context: Walking room to room through his home  Associated Symptoms: Chronic cough intermittently productive of clear sputum, occasional wheezing, no fevers, no hemoptysis, + early satiety and shortness of breath while eating which is resulted in gradual weight loss  Modifying Factors: Worse with exertion, improves with rest/oxygen/inhaler regimen  Supplemental Oxygen: 2-3L NC  Ever had Blood Clots: No  Cardiac History: CAD  Last Hospitalization: May 2025  Number of exacerbations per year: 2  Occupational history: Utility service, auto garage work    Subjective      Review of Systems:   Review of Systems   Constitutional:  Positive for appetite change. Negative for fever and unexpected weight change.   Respiratory:  Positive for cough, shortness of breath and wheezing.    Cardiovascular:  Negative for " chest pain and leg swelling.   Gastrointestinal:  Positive for constipation (Pending GI evaluation).   Genitourinary:  Negative for difficulty urinating.   Allergic/Immunologic: Positive for environmental allergies.        Past Medical History:   Past Medical History:   Diagnosis Date    Allergic rhinitis     Arthritis     Hypertension     Myocardial infarction     Psoriasis     Skin cancer        Past Surgical History:   Past Surgical History:   Procedure Laterality Date    CORONARY STENT PLACEMENT         Family History:   Family History   Problem Relation Age of Onset    COPD Mother     Stroke Father     Aneurysm Brother     Stroke Brother        Social History:   Social History     Socioeconomic History    Marital status:    Tobacco Use    Smoking status: Every Day     Current packs/day: 1.00     Average packs/day: 1 pack/day for 61.0 years (61.0 ttl pk-yrs)     Types: Cigarettes     Start date: 6/3/1964    Smokeless tobacco: Never   Vaping Use    Vaping status: Never Used   Substance and Sexual Activity    Alcohol use: No    Drug use: No    Sexual activity: Defer       Medications:     Current Outpatient Medications:     albuterol sulfate  (90 Base) MCG/ACT inhaler, Inhale 2 puffs Every 4 (Four) Hours As Needed for wheezing., Disp: 18 g, Rfl: 5    aspirin 81 MG EC tablet, Take 1 tablet by mouth Daily., Disp: 360 tablet, Rfl: 0    Budeson-Glycopyrrol-Formoterol (Breztri Aerosphere) 160-9-4.8 MCG/ACT aerosol inhaler, Inhale 2 puffs by mouth  2 (Two) Times a Day. Rinse mouth after use, Disp: 10.7 g, Rfl: 5    carvedilol (COREG) 25 MG tablet, Take 1 tablet by mouth 2 (Two) Times a Day., Disp: 180 tablet, Rfl: 0    Cholecalciferol (VITAMIN D) 2000 UNITS tablet, Take 1 tablet by mouth Daily., Disp: , Rfl:     cloNIDine (CATAPRES-TTS) 0.3 MG/24HR patch, Place 1 patch on the skin 1 (One) Time Per Week., Disp: 12 each, Rfl: 1    clopidogrel (PLAVIX) 75 MG tablet, Take 1 tablet by mouth Daily., Disp: 90  "tablet, Rfl: 1    Coenzyme Q10 (CoQ10) 100 MG capsule, Take 1 capsule (100 mg total) by mouth daily for 90 days., Disp: 90 capsule, Rfl: 0    famotidine (Heartburn Relief) 10 MG tablet, Take 2 tablets by mouth Daily., Disp: 180 tablet, Rfl: 3    hydroCHLOROthiazide 25 MG tablet, Take 1 tablet by mouth Daily., Disp: 90 tablet, Rfl: 1    ipratropium-albuterol (DUO-NEB) 0.5-2.5 mg/3 ml nebulizer, Inhale contents of 1 vial via nebulizer every 6 (six) hours as needed for wheezing, Disp: 360 mL, Rfl: 11    lisinopril (PRINIVIL,ZESTRIL) 20 MG tablet, Take 1 tablet by mouth Daily for 90 days., Disp: 90 tablet, Rfl: 0    magnesium, as, gluconate (MAGONATE) 500 (27 Mg) MG tablet, Take 1 tablet by mouth Daily., Disp: 90 tablet, Rfl: 0    Misc Natural Products (Tumersaid) tablet, Take  by mouth., Disp: , Rfl:     simvastatin (ZOCOR) 80 MG tablet, Take 1 tablet by mouth Every Night., Disp: 90 tablet, Rfl: 1    Influenza Vac High-Dose Quad (Fluzone High-Dose Quadrivalent) 0.7 ML suspension prefilled syringe injection, Inject 0.7 mL into the appropriate muscle as directed by prescriber. (Patient not taking: Reported on 5/20/2025), Disp: 0.7 mL, Rfl: 0    lisinopril (PRINIVIL,ZESTRIL) 40 MG tablet, Take 1 tablet by mouth Daily. (Patient not taking: Reported on 6/3/2025), Disp: 90 tablet, Rfl: 1    vitamin D (ERGOCALCIFEROL) 1.25 MG (33420 UT) capsule capsule, Take 1 capsule by mouth., Disp: , Rfl:     Allergies:   No Known Allergies    Objective     Physical Exam:  Vital Signs:   Vitals:    06/03/25 1337   BP: 98/60   Pulse: 78   SpO2: 96%   Weight: 56.7 kg (125 lb)   Height: 172.7 cm (68\")     Body mass index is 19.01 kg/m².    Physical Exam  Vitals reviewed.   Constitutional:       General: He is not in acute distress.     Appearance: He is not toxic-appearing.      Comments: Thin   HENT:      Head: Normocephalic and atraumatic.      Mouth/Throat:      Mouth: Mucous membranes are moist.   Eyes:      Extraocular Movements: " Extraocular movements intact.      Conjunctiva/sclera: Conjunctivae normal.   Cardiovascular:      Rate and Rhythm: Normal rate.      Heart sounds: Normal heart sounds.   Pulmonary:      Effort: Pulmonary effort is normal.      Breath sounds: Normal breath sounds.      Comments: Barrel chested  Abdominal:      General: There is no distension.      Palpations: Abdomen is soft.   Musculoskeletal:         General: No swelling.      Cervical back: Neck supple.      Comments: Ambulates with a rolling walker   Skin:     General: Skin is warm and dry.      Findings: No rash.   Neurological:      General: No focal deficit present.      Mental Status: He is alert and oriented to person, place, and time.   Psychiatric:         Mood and Affect: Mood normal.         Behavior: Behavior normal.       Results Review:   May 2025 CTA chest showed no pulmonary embolism. Emphysematous disease. Bronchial thickening bilaterally greatest involving the lung bases. Findings may represent reactive airways disease including bronchitis. No focal consolidation or large pleural effusion. Coronary atherosclerosis. Enlarged main pulmonary arteries may indicate pulmonary hypertension. Multiple sclerotic foci predominantly involving the endplates throughout the thoracic spine. If history of neoplasm recommend nuclear medicine bone scan.    WBC   Date Value Ref Range Status   05/22/2025 14.31 (H) 3.40 - 10.80 10*3/mm3 Final   12/21/2022 6.27 3.40 - 10.80 10*3/mm3 Final     RBC   Date Value Ref Range Status   05/22/2025 4.54 4.14 - 5.80 10*6/mm3 Final   12/21/2022 4.61 4.14 - 5.80 10*6/mm3 Final     Hemoglobin   Date Value Ref Range Status   05/22/2025 15.4 13.0 - 17.7 g/dL Final     Hematocrit   Date Value Ref Range Status   05/22/2025 43.4 37.5 - 51.0 % Final     MCV   Date Value Ref Range Status   05/22/2025 95.6 79.0 - 97.0 fL Final     MCH   Date Value Ref Range Status   05/22/2025 33.9 (H) 26.6 - 33.0 pg Final     MCHC   Date Value Ref Range  Status   05/22/2025 35.5 31.5 - 35.7 g/dL Final     RDW   Date Value Ref Range Status   05/22/2025 12.3 12.3 - 15.4 % Final     RDW-SD   Date Value Ref Range Status   05/22/2025 43.6 37.0 - 54.0 fl Final     MPV   Date Value Ref Range Status   05/22/2025 8.7 6.0 - 12.0 fL Final     Platelets   Date Value Ref Range Status   05/22/2025 310 140 - 450 10*3/mm3 Final     Neutrophil %   Date Value Ref Range Status   05/19/2025 66.4 42.7 - 76.0 % Final     Lymphocyte %   Date Value Ref Range Status   05/19/2025 20.4 19.6 - 45.3 % Final     Monocyte %   Date Value Ref Range Status   05/19/2025 10.3 5.0 - 12.0 % Final     Eosinophil %   Date Value Ref Range Status   05/19/2025 2.2 0.3 - 6.2 % Final     Basophil %   Date Value Ref Range Status   05/19/2025 0.4 0.0 - 1.5 % Final     Immature Grans %   Date Value Ref Range Status   05/19/2025 0.3 0.0 - 0.5 % Final     Neutrophils, Absolute   Date Value Ref Range Status   05/19/2025 4.73 1.70 - 7.00 10*3/mm3 Final     Lymphocytes, Absolute   Date Value Ref Range Status   05/19/2025 1.45 0.70 - 3.10 10*3/mm3 Final     Monocytes, Absolute   Date Value Ref Range Status   05/19/2025 0.73 0.10 - 0.90 10*3/mm3 Final     Eosinophils, Absolute   Date Value Ref Range Status   05/19/2025 0.16 0.00 - 0.40 10*3/mm3 Final     Basophils, Absolute   Date Value Ref Range Status   05/19/2025 0.03 0.00 - 0.20 10*3/mm3 Final     Immature Grans, Absolute   Date Value Ref Range Status   05/19/2025 0.02 0.00 - 0.05 10*3/mm3 Final     nRBC   Date Value Ref Range Status   05/19/2025 0.0 0.0 - 0.2 /100 WBC Final     October 2020 absolute eosinophils 300.    Office spirometry obtained today showed FEV1 of 26%, FEV1/FVC ratio 41.    Assessment / Plan      Assessment/Plan:    Diagnoses and all orders for this visit:    1. Shortness of breath (Primary)  Improved since the time of recent hospitalization.  Office spirometry obtained today was consistent with very severe obstruction, results discussed with  patient.      2. Chronic obstructive pulmonary disease, unspecified COPD type  -     Complete PFT - Pre & Post Bronchodilator; Future  -     Alpha - 1 - Antitrypsin Deficiency; Future  -     Ambulatory Referral to Disease State Management  Will continue his current inhaled regimen. We discussed the risk and benefits of inhaled corticosteroids. Patient instructed to take them on a regular basis as prescribed. Patient instructed to rinse their mouth out after each use. Appropriate inhaler technique demonstrated today in clinic. Can consider use of Daliresp in the future though his weight may preclude use.  Further assess with full PFTs.  Plan to begin use of Nucala or Dupixent in attempts to improve symptoms and reduce exacerbation frequency.    3. Cigarette nicotine dependence without complication  Complete cessation is advised. Patient's symptoms are not expected to be as controlled as possible while still smoking and progression of lung disease will occur more rapidly with ongoing cigarette use.     4. Chronic respiratory failure with hypoxia  Continues on supplemental O2. The patient was educated on the risk of fire/explosion and associated harm to self or others if open flame, including that from cigarettes if near a supplemental oxygen source. Patient voiced understanding.     5. Early satiety  His significant underlying lung disease appears to be contributing. Patient was advised to eat small, frequent, high calorie foods / meals throughout the day in attempts to maintain weight.        Follow Up:   1 month pharmacist follow up + biologic admin, 3 month follow up with me, 6 month follow up with Dr. Cruz per patient request  The patient was counseled on diagnostic results, risks and benefits of treatment options, risk factor modifications and the importance of treatment compliance. The patient was advised to contact the clinic with concerns or worsening symptoms.     SHERIDAN Galloway  Pulmonary Medicine  [facility]    This document has been electronically signed by Caroline Kilpatrick, SHERIDAN  Rosanne 3, 2025

## 2025-06-03 NOTE — PROGRESS NOTES
[facility] COPD Clinic Initial Visit Note       Patient Name: Lebron Miller  : 1955   MRN: [id number]   Sex: male  Care Team: Patient Care Team:  Natasha Veronica MD as PCP - General (Family Medicine)     Primary Care Provider:   Referring Provider: Eloy Teixeira MD  Encounter Provider: SHERIDAN Galloway      COPD Management Visit       History of Present Illness: Lebron Miller is a 70 y.o. male who is here today for initial evaluation of COPD Management.  He was admitted from  to  for COPD exacerbation and was discharged on 2 L of supplemental oxygen and steroid taper with prednisone. He was also started on 21 mg nicotine patches and nicotine gum.     Today in clinic he reports using his albuterol inhaler about once per day and has been using the nicotine patches but is still smoking. He has not been using gum so I counseled the patient on using gum for cravings to avoid smoking cigarettes and he said he would try this. I counseled the patient on correct technique for administering nicotine gum and the patient has no questions at this time and does not wish to be enrolled in the smoking cessation program. He reported that he has not been rinsing his mouth with Breztri use but will start doing so now. He confirmed that he has been using 2 puffs twice daily and has no questions at this time.     New patient history form is scanned into patient chart under media tab.    Subjective      Past Medical History:   Past Medical History:   Diagnosis Date    Allergic rhinitis     Arthritis     Hypertension     Myocardial infarction     Psoriasis     Skin cancer        Past Surgical History:   Past Surgical History:   Procedure Laterality Date    CORONARY STENT PLACEMENT         Family History:   Family History   Problem Relation Age of Onset    COPD Mother     Stroke Father     Aneurysm Brother     Stroke Brother        Social History:   Social History      Socioeconomic History    Marital status:    Tobacco Use    Smoking status: Every Day     Current packs/day: 1.00     Average packs/day: 1 pack/day for 61.0 years (61.0 ttl pk-yrs)     Types: Cigarettes     Start date: 6/3/1964    Smokeless tobacco: Never   Vaping Use    Vaping status: Never Used   Substance and Sexual Activity    Alcohol use: No    Drug use: No    Sexual activity: Defer       Tobacco History:   Social History     Tobacco Use   Smoking Status Every Day    Current packs/day: 1.00    Average packs/day: 1 pack/day for 61.0 years (61.0 ttl pk-yrs)    Types: Cigarettes    Start date: 6/3/1964   Smokeless Tobacco Never       Medications:     Current Outpatient Medications:     albuterol sulfate  (90 Base) MCG/ACT inhaler, Inhale 2 puffs Every 4 (Four) Hours As Needed for wheezing., Disp: 18 g, Rfl: 5    aspirin 81 MG EC tablet, Take 1 tablet by mouth Daily., Disp: 360 tablet, Rfl: 0    Budeson-Glycopyrrol-Formoterol (Breztri Aerosphere) 160-9-4.8 MCG/ACT aerosol inhaler, Inhale 2 puffs by mouth  2 (Two) Times a Day. Rinse mouth after use, Disp: 10.7 g, Rfl: 5    carvedilol (COREG) 25 MG tablet, Take 1 tablet by mouth 2 (Two) Times a Day., Disp: 180 tablet, Rfl: 0    Cholecalciferol (VITAMIN D) 2000 UNITS tablet, Take 1 tablet by mouth Daily., Disp: , Rfl:     cloNIDine (CATAPRES-TTS) 0.3 MG/24HR patch, Place 1 patch on the skin 1 (One) Time Per Week., Disp: 12 each, Rfl: 1    clopidogrel (PLAVIX) 75 MG tablet, Take 1 tablet by mouth Daily., Disp: 90 tablet, Rfl: 1    Coenzyme Q10 (CoQ10) 100 MG capsule, Take 1 capsule (100 mg total) by mouth daily for 90 days., Disp: 90 capsule, Rfl: 0    famotidine (Heartburn Relief) 10 MG tablet, Take 2 tablets by mouth Daily., Disp: 180 tablet, Rfl: 3    hydroCHLOROthiazide 25 MG tablet, Take 1 tablet by mouth Daily., Disp: 90 tablet, Rfl: 1    ipratropium-albuterol (DUO-NEB) 0.5-2.5 mg/3 ml nebulizer, Inhale contents of 1 vial via nebulizer every 6  (six) hours as needed for wheezing, Disp: 360 mL, Rfl: 11    lisinopril (PRINIVIL,ZESTRIL) 20 MG tablet, Take 1 tablet by mouth Daily for 90 days., Disp: 90 tablet, Rfl: 0    magnesium, as, gluconate (MAGONATE) 500 (27 Mg) MG tablet, Take 1 tablet by mouth Daily., Disp: 90 tablet, Rfl: 0    Misc Natural Products (Tumersaid) tablet, Take  by mouth., Disp: , Rfl:     simvastatin (ZOCOR) 80 MG tablet, Take 1 tablet by mouth Every Night., Disp: 90 tablet, Rfl: 1    Influenza Vac High-Dose Quad (Fluzone High-Dose Quadrivalent) 0.7 ML suspension prefilled syringe injection, Inject 0.7 mL into the appropriate muscle as directed by prescriber. (Patient not taking: Reported on 5/20/2025), Disp: 0.7 mL, Rfl: 0    lisinopril (PRINIVIL,ZESTRIL) 40 MG tablet, Take 1 tablet by mouth Daily. (Patient not taking: Reported on 6/3/2025), Disp: 90 tablet, Rfl: 1    vitamin D (ERGOCALCIFEROL) 1.25 MG (43914 UT) capsule capsule, Take 1 capsule by mouth., Disp: , Rfl:     Allergies:   No Known Allergies      Spirometry     FEV1 Result: 26%    COPD Classification: GOLD 4, Very Severe; FEV1 <30% predicted    Eosinophil Count:   Eosinophils, Absolute   Date Value Ref Range Status   05/19/2025 0.16 0.00 - 0.40 10*3/mm3 Final       Vaccination Status: Influenza current? Yes (9/24/2024) Pneumococcal current? Yes (3/18/2022)    COPD Symptom Assessment          CAT Score today:   mMRC today:     Exacerbation Assessment     How many times have you been hospitalized this year due to your COPD? 1      Education     Smoking Cessation: provided brief counseling on the benefit of smoking cessation in COPD and how to use nicotine patches and gum.     Visit 1: The following information was reviewed today  Patient history, questionnaires, scores, past and current COPD regimen  Assessed adherence, inhaler technique  Inhaler affordability was discussed  Medication education was printed and given to the patient  COPD Zones booklet was discussed and provided  to the patient  Discussed COPD rescue kit  RPM Device option was discussed      COPDEDUINHALER:   Technique for use of a pressurized metered dose inhaler (MDI) without a spacer    -Remove the cover of the mouthpiece.  -Prime your inhaler if this is the first time you are using it, if you have not used it for several days, or if you have dropped it. Priming an MDI usually involves shaking it and spraying it into the air (away from your face) a total of up to 4 times.   -Shake MDI canister vigorously for 5 seconds.  -Hold the MDI upright with your index finger on the top of the medication canister and your thumb supporting the bottom of the inhaler.  -Breathe out normally.  -Put the mouthpiece between your teeth and close your lips around mouthpiece or position mouthpiece approximately 4 cm (approximately width of 2 fingers) from your mouth.  -Keep your tongue away from the opening of the mouthpiece.  -Press down the top of the canister with the index finger to release the medication.  -At the same time as the canister is pressed, breathe in deeply and slowly through your mouth until your lungs are completely filled; this should take 4 to 6 seconds.  -Hold the medication in your lungs for as long as comfortable (approximately 5 to 10 seconds) before breathing out.  -If you need a second puff, wait approximately 15 to 30 seconds between puffs. Shake canister again before the next puff.  -When finished, recap mouthpiece.  -If your inhaler contains a steroid medicine (sometimes called glucocorticoid or corticosteroid), rinse your mouth and gargle with water after you use it. Then spit out the water. Do not swallow it.    Patient was provided education on Breztri inhaler, indication, possible side effects, how to store, and administration.  She was provided verbal and written education. Patient voiced understanding and has no further questions at this time.     Pharmacological Treatment Plan     TREATMENT:   Continue  Breztri 2 puffs twice daily  Will call patient if Nucala is approved to initiate in clinic.   Patient voiced understanding of how to use each inhaler and will call with any questions or concerns.   Patient voiced understanding of s/sx of exacerbation and will call the clinic within business hours or seek immediate care in ED      Follow-up Treatment Plan: Follow up via phone in 4 weeks and in clinic in 8 weeks.       Blanquita Kaye  06/03/2025  14:32 EDT

## 2025-06-10 ENCOUNTER — READMISSION MANAGEMENT (OUTPATIENT)
Dept: CALL CENTER | Facility: HOSPITAL | Age: 70
End: 2025-06-10
Payer: MEDICARE

## 2025-06-10 NOTE — OUTREACH NOTE
COPD/PN Week 3 Survey      Flowsheet Row Responses   [facility], [place] patient discharged from? [facility]   Does the patient have one of the following disease processes/diagnoses(primary or secondary)? COPD   Week 3 attempt successful? Yes   Call start time 1611   Unsuccessful attempts Attempt 1   Call end time 1621   Discharge diagnosis Acute exacerbation of chronic obstructive pulmonary disease (COPD)   Person spoke with today (if not patient) and relationship pt   Meds reviewed with patient/caregiver? Yes   Is the patient having any side effects they believe may be caused by any medication additions or changes? No   Does the patient have all medications ordered at discharge? Yes   Is the patient taking all medications as directed (includes completed medication regime)? Yes   Does the patient have a primary care provider?  Yes   Does the patient have an appointment with their PCP or specialist within 7 days of discharge? Yes   Has the patient kept scheduled appointments due by today? Yes   What is the Home health agency?  [facility]   Has home health visited the patient within 72 hours of discharge? Unsure   Psychosocial issues? No   What is the patient's perception of their health status since discharge? Improving   Nursing Interventions Nurse provided patient education   Is the patient/caregiver able to teach back the hierarchy of who to call/visit for symptoms/problems? PCP, Specialist, Home health nurse, Urgent Care, ED, 911 Yes   Is the patient able to teach back COPD zones? Yes   Nursing interventions Education provided on various zones   Patient reports what zone on this call? Yellow Zone   Yellow Zone Reports having a bad day or a COPD flare, Using quick relief inhaler/nebulizer more often   Yellow interventions Use quick relief inhaler, Get plenty of rest, Call provider immediately if symptoms don't improve: they may indicate that an adjustment in medication or oxygen therapy is needed   Week 3 call  completed? Yes   Graduated Yes   Is the patient interested in additional calls from an ambulatory ? No   Would this patient benefit from a Referral to [facility] Social Work? No   Wrap up additional comments Pt states he is doing ok and still having SOB. Educated pt on doing albuteral inhaler 15 min before using maintenance inhaler.   Call end time 1621            Meghana CHOW - Registered Nurse

## 2025-06-14 ENCOUNTER — APPOINTMENT (OUTPATIENT)
Dept: GENERAL RADIOLOGY | Facility: HOSPITAL | Age: 70
End: 2025-06-14
Payer: MEDICARE

## 2025-06-14 ENCOUNTER — HOSPITAL ENCOUNTER (OUTPATIENT)
Facility: HOSPITAL | Age: 70
Setting detail: OBSERVATION
Discharge: HOME | End: 2025-06-16
Attending: EMERGENCY MEDICINE | Admitting: STUDENT IN AN ORGANIZED HEALTH CARE EDUCATION/TRAINING PROGRAM
Payer: MEDICARE

## 2025-06-14 ENCOUNTER — APPOINTMENT (OUTPATIENT)
Dept: CT IMAGING | Facility: HOSPITAL | Age: 70
End: 2025-06-14
Payer: MEDICARE

## 2025-06-14 DIAGNOSIS — J44.1 COPD WITH ACUTE EXACERBATION: Primary | ICD-10-CM

## 2025-06-14 PROBLEM — J96.20 ACUTE-ON-CHRONIC RESPIRATORY FAILURE: Status: ACTIVE | Noted: 2025-06-14

## 2025-06-14 LAB
ALBUMIN SERPL-MCNC: 3.8 G/DL (ref 3.5–5.2)
ALBUMIN/GLOB SERPL: 1.3 G/DL
ALP SERPL-CCNC: 74 U/L (ref 39–117)
ALT SERPL W P-5'-P-CCNC: 16 U/L (ref 1–41)
ANION GAP SERPL CALCULATED.3IONS-SCNC: 7.9 MMOL/L (ref 5–15)
AST SERPL-CCNC: 17 U/L (ref 1–40)
BASOPHILS # BLD AUTO: 0.02 10*3/MM3 (ref 0–0.2)
BASOPHILS NFR BLD AUTO: 0.3 % (ref 0–1.5)
BILIRUB SERPL-MCNC: 0.6 MG/DL (ref 0–1.2)
BUN SERPL-MCNC: 12 MG/DL (ref 8–23)
BUN/CREAT SERPL: 17.4 (ref 7–25)
CALCIUM SPEC-SCNC: 9 MG/DL (ref 8.6–10.5)
CHLORIDE SERPL-SCNC: 101 MMOL/L (ref 98–107)
CO2 SERPL-SCNC: 30.1 MMOL/L (ref 22–29)
CREAT SERPL-MCNC: 0.69 MG/DL (ref 0.76–1.27)
DEPRECATED RDW RBC AUTO: 45.3 FL (ref 37–54)
EGFRCR SERPLBLD CKD-EPI 2021: 99.6 ML/MIN/1.73
EOSINOPHIL # BLD AUTO: 0.19 10*3/MM3 (ref 0–0.4)
EOSINOPHIL NFR BLD AUTO: 3 % (ref 0.3–6.2)
ERYTHROCYTE [DISTWIDTH] IN BLOOD BY AUTOMATED COUNT: 12.7 % (ref 12.3–15.4)
FLUAV SUBTYP SPEC NAA+PROBE: NOT DETECTED
FLUBV RNA ISLT QL NAA+PROBE: NOT DETECTED
GEN 5 1HR TROPONIN T REFLEX: 15 NG/L
GLOBULIN UR ELPH-MCNC: 2.9 GM/DL
GLUCOSE SERPL-MCNC: 90 MG/DL (ref 65–99)
HCT VFR BLD AUTO: 40.2 % (ref 37.5–51)
HGB BLD-MCNC: 14.2 G/DL (ref 13–17.7)
HOLD SPECIMEN: NORMAL
HOLD SPECIMEN: NORMAL
IMM GRANULOCYTES # BLD AUTO: 0.01 10*3/MM3 (ref 0–0.05)
IMM GRANULOCYTES NFR BLD AUTO: 0.2 % (ref 0–0.5)
LYMPHOCYTES # BLD AUTO: 1.25 10*3/MM3 (ref 0.7–3.1)
LYMPHOCYTES NFR BLD AUTO: 19.7 % (ref 19.6–45.3)
MCH RBC QN AUTO: 34.1 PG (ref 26.6–33)
MCHC RBC AUTO-ENTMCNC: 35.3 G/DL (ref 31.5–35.7)
MCV RBC AUTO: 96.4 FL (ref 79–97)
MONOCYTES # BLD AUTO: 0.63 10*3/MM3 (ref 0.1–0.9)
MONOCYTES NFR BLD AUTO: 9.9 % (ref 5–12)
NEUTROPHILS NFR BLD AUTO: 4.25 10*3/MM3 (ref 1.7–7)
NEUTROPHILS NFR BLD AUTO: 66.9 % (ref 42.7–76)
NRBC BLD AUTO-RTO: 0 /100 WBC (ref 0–0.2)
NT-PROBNP SERPL-MCNC: 178 PG/ML (ref 0–900)
PLATELET # BLD AUTO: 295 10*3/MM3 (ref 140–450)
PMV BLD AUTO: 8.1 FL (ref 6–12)
POTASSIUM SERPL-SCNC: 4.5 MMOL/L (ref 3.5–5.2)
PROT SERPL-MCNC: 6.7 G/DL (ref 6–8.5)
RBC # BLD AUTO: 4.17 10*6/MM3 (ref 4.14–5.8)
SARS-COV-2 RNA RESP QL NAA+PROBE: NOT DETECTED
SODIUM SERPL-SCNC: 139 MMOL/L (ref 136–145)
TROPONIN T NUMERIC DELTA: 0 NG/L
TROPONIN T SERPL HS-MCNC: 15 NG/L
WBC NRBC COR # BLD AUTO: 6.35 10*3/MM3 (ref 3.4–10.8)
WHOLE BLOOD HOLD COAG: NORMAL
WHOLE BLOOD HOLD SPECIMEN: NORMAL

## 2025-06-14 PROCEDURE — 71275 CT ANGIOGRAPHY CHEST: CPT

## 2025-06-14 PROCEDURE — 25010000002 METHYLPREDNISOLONE PER 125 MG: Performed by: EMERGENCY MEDICINE

## 2025-06-14 PROCEDURE — 94640 AIRWAY INHALATION TREATMENT: CPT

## 2025-06-14 PROCEDURE — G0378 HOSPITAL OBSERVATION PER HR: HCPCS

## 2025-06-14 PROCEDURE — 96365 THER/PROPH/DIAG IV INF INIT: CPT

## 2025-06-14 PROCEDURE — 99222 1ST HOSP IP/OBS MODERATE 55: CPT | Performed by: STUDENT IN AN ORGANIZED HEALTH CARE EDUCATION/TRAINING PROGRAM

## 2025-06-14 PROCEDURE — 71045 X-RAY EXAM CHEST 1 VIEW: CPT

## 2025-06-14 PROCEDURE — 96375 TX/PRO/DX INJ NEW DRUG ADDON: CPT

## 2025-06-14 PROCEDURE — 99285 EMERGENCY DEPT VISIT HI MDM: CPT | Performed by: EMERGENCY MEDICINE

## 2025-06-14 PROCEDURE — 93005 ELECTROCARDIOGRAM TRACING: CPT | Performed by: EMERGENCY MEDICINE

## 2025-06-14 PROCEDURE — 84484 ASSAY OF TROPONIN QUANT: CPT | Performed by: EMERGENCY MEDICINE

## 2025-06-14 PROCEDURE — 94799 UNLISTED PULMONARY SVC/PX: CPT

## 2025-06-14 PROCEDURE — 36415 COLL VENOUS BLD VENIPUNCTURE: CPT

## 2025-06-14 PROCEDURE — 25010000002 AMPICILLIN-SULBACTAM PER 1.5 G: Performed by: STUDENT IN AN ORGANIZED HEALTH CARE EDUCATION/TRAINING PROGRAM

## 2025-06-14 PROCEDURE — 85025 COMPLETE CBC W/AUTO DIFF WBC: CPT

## 2025-06-14 PROCEDURE — 94761 N-INVAS EAR/PLS OXIMETRY MLT: CPT

## 2025-06-14 PROCEDURE — 80053 COMPREHEN METABOLIC PANEL: CPT | Performed by: EMERGENCY MEDICINE

## 2025-06-14 PROCEDURE — 25510000001 IOPAMIDOL 61 % SOLUTION: Performed by: EMERGENCY MEDICINE

## 2025-06-14 PROCEDURE — 83880 ASSAY OF NATRIURETIC PEPTIDE: CPT | Performed by: EMERGENCY MEDICINE

## 2025-06-14 PROCEDURE — 87636 SARSCOV2 & INF A&B AMP PRB: CPT | Performed by: EMERGENCY MEDICINE

## 2025-06-14 PROCEDURE — 63710000001 REVEFENACIN 175 MCG/3ML SOLUTION: Performed by: STUDENT IN AN ORGANIZED HEALTH CARE EDUCATION/TRAINING PROGRAM

## 2025-06-14 RX ORDER — IPRATROPIUM BROMIDE AND ALBUTEROL SULFATE 2.5; .5 MG/3ML; MG/3ML
3 SOLUTION RESPIRATORY (INHALATION) ONCE
Status: COMPLETED | OUTPATIENT
Start: 2025-06-14 | End: 2025-06-14

## 2025-06-14 RX ORDER — GUAIFENESIN 600 MG/1
600 TABLET, EXTENDED RELEASE ORAL EVERY 12 HOURS SCHEDULED
Status: DISCONTINUED | OUTPATIENT
Start: 2025-06-14 | End: 2025-06-16 | Stop reason: HOSPADM

## 2025-06-14 RX ORDER — IPRATROPIUM BROMIDE AND ALBUTEROL SULFATE 2.5; .5 MG/3ML; MG/3ML
3 SOLUTION RESPIRATORY (INHALATION) EVERY 4 HOURS PRN
Status: DISCONTINUED | OUTPATIENT
Start: 2025-06-14 | End: 2025-06-16

## 2025-06-14 RX ORDER — ASPIRIN 81 MG/1
81 TABLET ORAL DAILY
Status: DISCONTINUED | OUTPATIENT
Start: 2025-06-14 | End: 2025-06-16 | Stop reason: HOSPADM

## 2025-06-14 RX ORDER — IOPAMIDOL 612 MG/ML
100 INJECTION, SOLUTION INTRAVASCULAR
Status: COMPLETED | OUTPATIENT
Start: 2025-06-14 | End: 2025-06-14

## 2025-06-14 RX ORDER — ENOXAPARIN SODIUM 100 MG/ML
40 INJECTION SUBCUTANEOUS EVERY 24 HOURS
Status: DISCONTINUED | OUTPATIENT
Start: 2025-06-14 | End: 2025-06-16 | Stop reason: HOSPADM

## 2025-06-14 RX ORDER — SODIUM CHLORIDE 0.9 % (FLUSH) 0.9 %
10 SYRINGE (ML) INJECTION AS NEEDED
Status: DISCONTINUED | OUTPATIENT
Start: 2025-06-14 | End: 2025-06-16 | Stop reason: HOSPADM

## 2025-06-14 RX ORDER — CLOPIDOGREL BISULFATE 75 MG/1
75 TABLET ORAL DAILY
Status: DISCONTINUED | OUTPATIENT
Start: 2025-06-14 | End: 2025-06-16 | Stop reason: HOSPADM

## 2025-06-14 RX ORDER — ARFORMOTEROL TARTRATE 15 UG/2ML
15 SOLUTION RESPIRATORY (INHALATION)
Status: DISCONTINUED | OUTPATIENT
Start: 2025-06-14 | End: 2025-06-16 | Stop reason: HOSPADM

## 2025-06-14 RX ORDER — BUDESONIDE 0.5 MG/2ML
0.5 INHALANT ORAL
Status: DISCONTINUED | OUTPATIENT
Start: 2025-06-14 | End: 2025-06-16 | Stop reason: HOSPADM

## 2025-06-14 RX ORDER — METHYLPREDNISOLONE SODIUM SUCCINATE 125 MG/2ML
62.5 INJECTION, POWDER, LYOPHILIZED, FOR SOLUTION INTRAMUSCULAR; INTRAVENOUS DAILY
Status: DISCONTINUED | OUTPATIENT
Start: 2025-06-15 | End: 2025-06-15

## 2025-06-14 RX ORDER — NICOTINE 21 MG/24HR
1 PATCH, TRANSDERMAL 24 HOURS TRANSDERMAL EVERY 24 HOURS
Status: DISCONTINUED | OUTPATIENT
Start: 2025-06-14 | End: 2025-06-16 | Stop reason: HOSPADM

## 2025-06-14 RX ORDER — METHYLPREDNISOLONE SODIUM SUCCINATE 125 MG/2ML
125 INJECTION, POWDER, LYOPHILIZED, FOR SOLUTION INTRAMUSCULAR; INTRAVENOUS ONCE
Status: COMPLETED | OUTPATIENT
Start: 2025-06-14 | End: 2025-06-14

## 2025-06-14 RX ORDER — PANTOPRAZOLE SODIUM 40 MG/10ML
40 INJECTION, POWDER, LYOPHILIZED, FOR SOLUTION INTRAVENOUS
Status: DISCONTINUED | OUTPATIENT
Start: 2025-06-14 | End: 2025-06-16 | Stop reason: HOSPADM

## 2025-06-14 RX ADMIN — AMPICILLIN SODIUM AND SULBACTAM SODIUM 3000 MG: 2; 1 INJECTION, POWDER, FOR SOLUTION INTRAMUSCULAR; INTRAVENOUS at 20:14

## 2025-06-14 RX ADMIN — IPRATROPIUM BROMIDE AND ALBUTEROL SULFATE 3 ML: 2.5; .5 SOLUTION RESPIRATORY (INHALATION) at 17:10

## 2025-06-14 RX ADMIN — PANTOPRAZOLE SODIUM 40 MG: 40 INJECTION, POWDER, FOR SOLUTION INTRAVENOUS at 20:14

## 2025-06-14 RX ADMIN — ARFORMOTEROL TARTRATE 15 MCG: 15 SOLUTION RESPIRATORY (INHALATION) at 20:09

## 2025-06-14 RX ADMIN — IOPAMIDOL 100 ML: 612 INJECTION, SOLUTION INTRAVENOUS at 16:22

## 2025-06-14 RX ADMIN — NICOTINE 1 PATCH: 21 PATCH TRANSDERMAL at 20:13

## 2025-06-14 RX ADMIN — METHYLPREDNISOLONE SODIUM SUCCINATE 125 MG: 125 INJECTION, POWDER, FOR SOLUTION INTRAMUSCULAR; INTRAVENOUS at 14:46

## 2025-06-14 RX ADMIN — BUDESONIDE 0.5 MG: 0.5 SUSPENSION RESPIRATORY (INHALATION) at 20:10

## 2025-06-14 RX ADMIN — REVEFENACIN 175 MCG: 175 SOLUTION RESPIRATORY (INHALATION) at 20:10

## 2025-06-14 RX ADMIN — GUAIFENESIN 600 MG: 600 TABLET, MULTILAYER, EXTENDED RELEASE ORAL at 20:13

## 2025-06-14 NOTE — H&P
[facility]   HISTORY AND PHYSICAL      Name:  Lebron Miller   Age:  70 y.o.  Sex:  male  :  1955  MRN:  [id number]   Visit Number:  [id number]  Admission Date:  2025  Date Of Service:  25  Primary Care Physician:  Natasha Veronica MD    Chief Complaint:   Shortness of breath    History Of Presenting Illness:    Patient is 70-year-old male with history of hypertension, COPD, coronary artery disease status post stents, hyperlipidemia, and multiple admissions for COPD exacerbation who presents with shortness of breath for the past days ago, associated with dry cough but congested.  Patient reports being on 2 L nasal cannula but has now bumped himself up to 2.5, with use of nebulizers and rescue inhaler without relief. Patient is still smoking but tells me he is now ready to quit.  Patient also reports a recent reduction in his lisinopril dosing from 40 mg to 20 mg. Patient denies syncopal events or feeling lightheaded.    Notable vital signs:   Blood pressure 91/64 now 107/68.  98% on 2 L    Notable labs/diagnostic imaging:   CTA chest: Negative for pulmonary emboli.  Emphysematous lungs without acute parenchymal lung disease.  Possible pulmonary artery hypertension.  Trace pericardial effusion.  Influenza/COVID-19 negative  High-sensitivity troponin and proBNP within normal limits      ER interventions:   Solu-Medrol 125 mg     Review Of Systems:    All systems were reviewed and negative except as mentioned in history of presenting illness, assessment and plan.    Past Medical History: Patient  has a past medical history of Allergic rhinitis, Arthritis, Hypertension, Myocardial infarction, Psoriasis, and Skin cancer.    Past Surgical History: Patient  has a past surgical history that includes Coronary stent placement.    Social History: Patient  reports that he has been smoking cigarettes. He started smoking about 61 years ago. He has a 61 pack-year smoking  history. He has never used smokeless tobacco. He reports that he does not drink alcohol and does not use drugs.    Family History:  Patient's family history has been reviewed and found to be noncontributory.     Allergies:      Patient has no known allergies.    Home Medications:    Prior to Admission Medications       Prescriptions Last Dose Informant Patient Reported? Taking?    albuterol sulfate  (90 Base) MCG/ACT inhaler   No No    Inhale 2 puffs Every 4 (Four) Hours As Needed for wheezing.    aspirin 81 MG EC tablet   No No    Take 1 tablet by mouth Daily.    Budeson-Glycopyrrol-Formoterol (Breztri Aerosphere) 160-9-4.8 MCG/ACT aerosol inhaler   No No    Inhale 2 puffs by mouth  2 (Two) Times a Day. Rinse mouth after use    carvedilol (COREG) 25 MG tablet   No No    Take 1 tablet by mouth 2 (Two) Times a Day.    Cholecalciferol (VITAMIN D) 2000 UNITS tablet   Yes No    Take 1 tablet by mouth Daily.    cloNIDine (CATAPRES-TTS) 0.3 MG/24HR patch   No No    Place 1 patch on the skin 1 (One) Time Per Week.    clopidogrel (PLAVIX) 75 MG tablet   No No    Take 1 tablet by mouth Daily.    Coenzyme Q10 (CoQ10) 100 MG capsule   No No    Take 1 capsule (100 mg total) by mouth daily for 90 days.    famotidine (Heartburn Relief) 10 MG tablet   No No    Take 2 tablets by mouth Daily.    hydroCHLOROthiazide 25 MG tablet   No No    Take 1 tablet by mouth Daily.    ipratropium-albuterol (DUO-NEB) 0.5-2.5 mg/3 ml nebulizer   No No    Inhale contents of 1 vial via nebulizer every 6 (six) hours as needed for wheezing    lisinopril (PRINIVIL,ZESTRIL) 20 MG tablet   No No    Take 1 tablet by mouth Daily for 90 days.    lisinopril (PRINIVIL,ZESTRIL) 40 MG tablet   No No    Take 1 tablet by mouth Daily.    magnesium, as, gluconate (MAGONATE) 500 (27 Mg) MG tablet   No No    Take 1 tablet by mouth Daily.    Misc Natural Products (Tumersaid) tablet   Yes No    Take  by mouth.    simvastatin (ZOCOR) 80 MG tablet   No No    Take 1  tablet by mouth Every Night.    vitamin D (ERGOCALCIFEROL) 1.25 MG (37046 UT) capsule capsule   Yes No    Take 1 capsule by mouth.          ED Medications:    Medications   sodium chloride 0.9 % flush 10 mL (has no administration in time range)   ampicillin-sulbactam (UNASYN) 3,000 mg in sodium chloride 0.9 % 100 mL IVPB-VTB (3,000 mg Intravenous New Bag 6/14/25 2014)   nicotine (NICODERM CQ) 21 MG/24HR patch 1 patch (1 patch Transdermal Medication Applied 6/14/25 2013)   nicotine polacrilex (NICORETTE) gum 2 mg (has no administration in time range)   guaiFENesin (MUCINEX) 12 hr tablet 600 mg (600 mg Oral Given 6/14/25 2013)   methylPREDNISolone sodium succinate (SOLU-Medrol) injection 62.5 mg (has no administration in time range)   ipratropium-albuterol (DUO-NEB) nebulizer solution 3 mL (has no administration in time range)   clopidogrel (PLAVIX) tablet 75 mg (75 mg Oral Not Given 6/14/25 2013)   pantoprazole (PROTONIX) injection 40 mg (40 mg Intravenous Given 6/14/25 2014)   arformoterol (BROVANA) nebulizer solution 15 mcg ( Nebulization Canceled Entry 6/14/25 2130)     And   budesonide (PULMICORT) nebulizer solution 0.5 mg ( Nebulization Canceled Entry 6/14/25 2130)     And   revefenacin (YUPELRI) nebulizer solution 175 mcg ( Nebulization Canceled Entry 6/14/25 2130)   aspirin EC tablet 81 mg (81 mg Oral Not Given 6/14/25 2013)   Pharmacy to Dose enoxaparin (LOVENOX) (has no administration in time range)   methylPREDNISolone sodium succinate (SOLU-Medrol) injection 125 mg (125 mg Intravenous Given 6/14/25 1446)   iopamidol (ISOVUE-300) 61 % injection 100 mL (100 mL Intravenous Given 6/14/25 1622)   ipratropium-albuterol (DUO-NEB) nebulizer solution 3 mL (3 mL Nebulization Given 6/14/25 1710)     Vital Signs:  Temp:  [97.9 °F (36.6 °C)] 97.9 °F (36.6 °C)  Heart Rate:  [64-73] 73  Resp:  [18] 18  BP: ()/(63-99) 107/68        06/14/25  1316   Weight: 57.2 kg (126 lb)     Body mass index is 19.73  "kg/m².    Physical Exam:     Most recent vital Signs: /68   Pulse 73   Temp 97.9 °F (36.6 °C) (Oral)   Resp 18   Ht 170.2 cm (67\")   Wt 57.2 kg (126 lb)   SpO2 95%   BMI 19.73 kg/m²     Physical Exam  Vitals and nursing note reviewed.   HENT:      Head: Normocephalic and atraumatic.      Nose: Nose normal.   Eyes:      Extraocular Movements: Extraocular movements intact.      Pupils: Pupils are equal, round, and reactive to light.   Cardiovascular:      Rate and Rhythm: Normal rate and regular rhythm.   Pulmonary:      Effort: Pulmonary effort is normal.      Breath sounds: Rhonchi present.   Abdominal:      Palpations: Abdomen is soft.   Musculoskeletal:      Right lower leg: No edema.      Left lower leg: No edema.   Neurological:      General: No focal deficit present.      Mental Status: He is alert and oriented to person, place, and time.         Laboratory data:    I have reviewed the labs done in the emergency room.    Results from last 7 days   Lab Units 06/14/25  1324   SODIUM mmol/L 139   POTASSIUM mmol/L 4.5   CHLORIDE mmol/L 101   CO2 mmol/L 30.1*   BUN mg/dL 12.0   CREATININE mg/dL 0.69*   CALCIUM mg/dL 9.0   BILIRUBIN mg/dL 0.6   ALK PHOS U/L 74   ALT (SGPT) U/L 16   AST (SGOT) U/L 17   GLUCOSE mg/dL 90     Results from last 7 days   Lab Units 06/14/25  1324   WBC 10*3/mm3 6.35   HEMOGLOBIN g/dL 14.2   HEMATOCRIT % 40.2   PLATELETS 10*3/mm3 295         Results from last 7 days   Lab Units 06/14/25  1439 06/14/25  1324   HSTROP T ng/L 15 15     Results from last 7 days   Lab Units 06/14/25  1324   PROBNP pg/mL 178.0                       Invalid input(s): \"USDES\", \"NITRITITE\", \"BACT\", \"EP\"    Pain Management Panel           No data to display                EKG:    Sinus rhythm    Radiology:    CT Angiogram Chest Pulmonary Embolism  Result Date: 6/14/2025  FINAL REPORT TECHNIQUE: null CLINICAL HISTORY: Pulmonary Embolism COMPARISON: null FINDINGS: CT angiography chest with contrast. 3D " Postprocessing. Comparison: 05/19/2025 Findings: The pulmonary arteries are well opacified. Central pulmonary artery dilated at 3.4 cm. Scattered plaque in the thoracic aorta without aneurysm or dissection. Heart is not enlarged. Trace anterior pericardial effusion. Several calcified mediastinal and right hilar lymph nodes. The thyroid is unremarkable. Lungs are emphysematous. No focal airspace consolidation, pleural effusion, or pneumothorax. No worrisome pulmonary masses or nodules. Calcified granulomas in the spleen. Hypodense bilateral renal cysts. No acute fractures.     IMPRESSION: 1. No pulmonary emboli. 2. Emphysematous lungs without acute parenchymal lung disease. 3. Dilatation of the central pulmonary artery may suggest a degree of pulmonary arterial hypertension. 4. Trace anterior pericardial effusion. Authenticated and Electronically Signed by Kevin Gross MD on 06/14/2025 06:13:01 PM      Assessment:    Acute on chronic COPD exacerbation  Hypertension presenting with normal to low blood pressures  Coronary artery disease  Smoker        Plan:  COPD pathway ordered.  Continue IV Solu-Medrol.  DuoNebs as needed positive continuous pulse ox.  IV antibiotics.  Chest PT ordered  Hold home blood pressure medications at this time.   Continue home regimen  Nicotine patch ordered    Risk Assessment: High  DVT Prophylaxis: Lovenox   Code Status: Full   Diet: regular         Naveed Tejada DO  06/14/25  20:29 EDT    Part of this note may be an electronic transcription/translation of spoken language to printed text using the Dragon dictation system

## 2025-06-14 NOTE — ED PROVIDER NOTES
[facility] 3  Emergency Department Encounter  Emergency Medicine Physician Note     Pt Name:Lebron Miller  MRN: [id number]  Birthdate 1955  Date of evaluation: 6/14/2025  PCP:  Natasha Veronica MD  Note Started: 1:38 PM EDT      CHIEF COMPLAINT       Chief Complaint   Patient presents with    Shortness of Breath       HISTORY OF PRESENT ILLNESS  (Location/Symptom, Timing/Onset, Context/Setting, Quality, Duration, Modifying Factors, Severity.)      Lebron Miller is a 70 y.o. male who presents with COPD exacerbation.  Patient presenting with worsening shortness of breath has been coming in a couple days.  Patient has been utilizing his inhaler, was evaluated by the pulmonology clinic who is recommending a medication that is currently being preauthorized by insurance.  Patient describes generalized cough, shortness of breath lightheadedness and increased dyspnea with exertion.  Patient states he is unable to perform activities of daily living at home due to shortness of breath.  Patient denies any abdominal pain but does describe some general abdominal distention and recent constipation.  Patient had a milk of magnesia yesterday which then had a bowel movement describes as large bowel movement yesterday.    PAST MEDICAL / SURGICAL / SOCIAL / FAMILY HISTORY     Past Medical History:   Diagnosis Date    Allergic rhinitis     Arthritis     Hypertension     Myocardial infarction     Psoriasis     Skin cancer      No additional pertinent       Past Surgical History:   Procedure Laterality Date    CORONARY STENT PLACEMENT       No additional pertinent     Social History     Socioeconomic History    Marital status:    Tobacco Use    Smoking status: Every Day     Current packs/day: 1.00     Average packs/day: 1 pack/day for 61.0 years (61.0 ttl pk-yrs)     Types: Cigarettes     Start date: 6/3/1964    Smokeless tobacco: Never   Vaping Use    Vaping status: Never Used    Substance and Sexual Activity    Alcohol use: No    Drug use: No    Sexual activity: Defer       Family History   Problem Relation Age of Onset    COPD Mother     Stroke Father     Aneurysm Brother     Stroke Brother        Allergies:  Patient has no known allergies.    Home Medications:  Prior to Admission medications    Medication Sig Start Date End Date Taking? Authorizing Provider   albuterol sulfate  (90 Base) MCG/ACT inhaler Inhale 2 puffs Every 4 (Four) Hours As Needed for wheezing. 2/13/25      aspirin 81 MG EC tablet Take 1 tablet by mouth Daily. 7/18/24      Budeson-Glycopyrrol-Formoterol (Breztri Aerosphere) 160-9-4.8 MCG/ACT aerosol inhaler Inhale 2 puffs by mouth  2 (Two) Times a Day. Rinse mouth after use 7/18/24      carvedilol (COREG) 25 MG tablet Take 1 tablet by mouth 2 (Two) Times a Day. 2/13/25      Cholecalciferol (VITAMIN D) 2000 UNITS tablet Take 1 tablet by mouth Daily. 3/14/13   Provider, MD Roshan   cloNIDine (CATAPRES-TTS) 0.3 MG/24HR patch Place 1 patch on the skin 1 (One) Time Per Week. 5/20/24   Alie Macias DO   clopidogrel (PLAVIX) 75 MG tablet Take 1 tablet by mouth Daily. 2/13/25      Coenzyme Q10 (CoQ10) 100 MG capsule Take 1 capsule (100 mg total) by mouth daily for 90 days. 7/18/24      famotidine (Heartburn Relief) 10 MG tablet Take 2 tablets by mouth Daily. 10/25/22   Vermeesch, Marilyn K, MD   hydroCHLOROthiazide 25 MG tablet Take 1 tablet by mouth Daily. 2/13/25      ipratropium-albuterol (DUO-NEB) 0.5-2.5 mg/3 ml nebulizer Inhale contents of 1 vial via nebulizer every 6 (six) hours as needed for wheezing 6/3/25      lisinopril (PRINIVIL,ZESTRIL) 20 MG tablet Take 1 tablet by mouth Daily for 90 days. 6/2/25 9/1/25     lisinopril (PRINIVIL,ZESTRIL) 40 MG tablet Take 1 tablet by mouth Daily. 2/13/25      magnesium, as, gluconate (MAGONATE) 500 (27 Mg) MG tablet Take 1 tablet by mouth Daily. 7/18/24   Gualberto Luna, SHERIDAN   Misc Natural Products  "(Tumersaid) tablet Take  by mouth.    ProviderRoshan MD   simvastatin (ZOCOR) 80 MG tablet Take 1 tablet by mouth Every Night. 2/13/25      vitamin D (ERGOCALCIFEROL) 1.25 MG (69366 UT) capsule capsule Take 1 capsule by mouth.    Provider, MD Roshan         REVIEW OF SYSTEMS       Review of Systems   Constitutional:  Negative for chills and fever.   Respiratory:  Positive for chest tightness and shortness of breath.    Cardiovascular:  Negative for chest pain.   Gastrointestinal:  Negative for abdominal pain, diarrhea, nausea and vomiting.   Genitourinary:  Negative for flank pain.   Neurological:  Negative for dizziness and light-headedness.       PHYSICAL EXAM      INITIAL VITALS:   BP 90/58 (BP Location: Left arm, Patient Position: Lying)   Pulse 75   Temp 98.1 °F (36.7 °C) (Oral)   Resp 18   Ht 170.2 cm (67\")   Wt 51.1 kg (112 lb 8.7 oz)   SpO2 94%   BMI 17.63 kg/m²     Physical Exam  Constitutional:       Appearance: Normal appearance.   HENT:      Head: Normocephalic and atraumatic.   Eyes:      Extraocular Movements: Extraocular movements intact.   Cardiovascular:      Rate and Rhythm: Normal rate and regular rhythm.   Pulmonary:      Effort: Pulmonary effort is normal.      Breath sounds: Decreased breath sounds and wheezing present.   Abdominal:      General: Abdomen is flat.      Palpations: Abdomen is soft.      Tenderness: There is no abdominal tenderness.   Musculoskeletal:      Right lower leg: No edema.      Left lower leg: No edema.   Skin:     General: Skin is warm and dry.   Neurological:      General: No focal deficit present.      Mental Status: He is alert and oriented to person, place, and time.   Psychiatric:         Mood and Affect: Mood normal.         Behavior: Behavior normal.           DDX/DIAGNOSTIC RESULTS / EMERGENCY DEPARTMENT COURSE / MDM     Differential Diagnosis included but not limited: COPD exacerbation, pneumonia, infection, sepsis, pulmonary embolism, CHF " exacerbation, ACS    Diagnoses Considered but Do Not Suspect: Aortic dissection    Decision Rules/Scores utilized: N/A     Tests considered but not ordered and why:  N/A     MIPS: N/A     Code Status Discussion:  Not Discussed    Additional Patient Education Provided: None     Medical Decision Making    Medical Decision Making  Patient is a 70-year-old male who presented today with acute dyspnea and shortness of breath.  Patient has mild improvement after Solu-Medrol and breathing treatment.  Patient frequent with COPD exacerbations, still smokes.  Patient still with increased O2 requirement, not on BiPAP.  Patient with severe dyspnea with ambulation, gets lightheaded when standing.  Patient unable to perform activities of daily living at home.  Admitting patient for further evaluation workup COPD exacerbation with PT OT evaluation.  Patient been here multiple times patient may need consideration for placement.  Patient mated hospitalist group for further management and care.      Problems Addressed:  COPD with acute exacerbation: complicated acute illness or injury    Amount and/or Complexity of Data Reviewed  External Data Reviewed: labs, radiology and notes.  Labs: ordered. Decision-making details documented in ED Course.  Radiology: ordered.  ECG/medicine tests: ordered.  Discussion of management or test interpretation with external provider(s): Hospitalist for admission    Risk  Prescription drug management.  Decision regarding hospitalization.        See ED COURSE for additional MDM statements    EKG    EKG Interpretation    Evaluated and interpreted by emergency department physician    Rhythm: Normal Sinus Rhythm  Rate: Normal  Axis: Anabel  Ectopy: none  Conduction: Normal  ST Segments: Normal  T Waves: Normal  Q Waves: aVR  Clinical Impression: Normal Sinus Rhythm    Rodrigo Perez,       All EKG's are interpreted by the Emergency Department Physician who either signs or Co-signs this chart in  the absence of a cardiologist.    Additional Scores                   EMERGENCY DEPARTMENT COURSE:    ED Course as of 06/15/25 0024   Sat Jun 14, 2025   1402 HS Troponin T: 15 [CR]      ED Course User Index  [CR] Rodrigo Perez DO       PROCEDURES:  None Performed   Procedures    DATA FOR LAB AND RADIOLOGY TESTS ORDERED BELOW ARE REVIEWED BY THE ED CLINICIAN:    RADIOLOGY: All x-rays, CT, MRI, and formal ultrasound images (except ED bedside ultrasound) are read by the radiologist, see reports below, unless otherwise noted in MDM or here.  Reports below are reviewed by myself.  CT Angiogram Chest Pulmonary Embolism   Final Result   IMPRESSION:      1. No pulmonary emboli.      2. Emphysematous lungs without acute parenchymal lung disease.      3. Dilatation of the central pulmonary artery may suggest a degree of pulmonary arterial hypertension.      4. Trace anterior pericardial effusion.      Authenticated and Electronically Signed by Kevin Gross MD on   06/14/2025 06:13:01 PM      XR Chest 1 View    (Results Pending)       LABS: Lab orders shown below, the results are reviewed by myself, and all abnormals are listed below.  Labs Reviewed   COMPREHENSIVE METABOLIC PANEL - Abnormal; Notable for the following components:       Result Value    Creatinine 0.69 (*)     CO2 30.1 (*)     All other components within normal limits    Narrative:     GFR Categories in Chronic Kidney Disease (CKD)              GFR Category          GFR (mL/min/1.73)    Interpretation  G1                    90 or greater        Normal or high (1)  G2                    60-89                Mild decrease (1)  G3a                   45-59                Mild to moderate decrease  G3b                   30-44                Moderate to severe decrease  G4                    15-29                Severe decrease  G5                    14 or less           Kidney failure    (1)In the absence of evidence of kidney disease, neither GFR  category G1 or G2 fulfill the criteria for CKD.    eGFR calculation 2021 CKD-EPI creatinine equation, which does not include race as a factor   CBC WITH AUTO DIFFERENTIAL - Abnormal; Notable for the following components:    MCH 34.1 (*)     All other components within normal limits   COVID-19 AND FLU A/B, NP SWAB IN TRANSPORT MEDIA 1 HR TAT - Normal    Narrative:     Fact sheet for providers: https://www.fda.gov/media/049070/download    Fact sheet for patients: https://www.fda.gov/media/677452/download    Test performed by PCR.   BNP (IN-HOUSE) - Normal    Narrative:     This assay is used as an aid in the diagnosis of individuals suspected of having heart failure. It can be used as an aid in the diagnosis of acute decompensated heart failure (ADHF) in patients presenting with signs and symptoms of ADHF to the emergency department (ED). In addition, NT-proBNP of <300 pg/mL indicates ADHF is not likely.    Age Range Result Interpretation  NT-proBNP Concentration (pg/mL:      <50             Positive            >450                   Gray                 300-450                    Negative             <300    50-75           Positive            >900                  Gray                300-900                  Negative            <300      >75             Positive            >1800                  Gray                300-1800                  Negative            <300   TROPONIN - Normal    Narrative:     High Sensitive Troponin T Reference Range:  <14.0 ng/L- Negative Female for AMI  <22.0 ng/L- Negative Male for AMI  >=14 - Abnormal Female indicating possible myocardial injury.  >=22 - Abnormal Male indicating possible myocardial injury.   Clinicians would have to utilize clinical acumen, EKG, Troponin, and serial changes to determine if it is an Acute Myocardial Infarction or myocardial injury due to an underlying chronic condition.        HIGH SENSITIVITIY TROPONIN T 1HR - Normal    Narrative:     High Sensitive  Troponin T Reference Range:  <14.0 ng/L- Negative Female for AMI  <22.0 ng/L- Negative Male for AMI  >=14 - Abnormal Female indicating possible myocardial injury.  >=22 - Abnormal Male indicating possible myocardial injury.   Clinicians would have to utilize clinical acumen, EKG, Troponin, and serial changes to determine if it is an Acute Myocardial Infarction or myocardial injury due to an underlying chronic condition.        COVID PRE-OP / PRE-PROCEDURE SCREENING ORDER (NO ISOLATION)    Narrative:     The following orders were created for panel order COVID PRE-OP / PRE-PROCEDURE SCREENING ORDER (NO ISOLATION) - Swab, Nasopharynx.  Procedure                               Abnormality         Status                     ---------                               -----------         ------                     COVID-19 and FLU A/B PCR...[[id number]]  Normal              Final result                 Please view results for these tests on the individual orders.   RESPIRATORY CULTURE   RAINBOW DRAW    Narrative:     The following orders were created for panel order [place] Draw.  Procedure                               Abnormality         Status                     ---------                               -----------         ------                     Green Top (Gel)[[id number]]                                  Final result               Lavender Top[[id number]]                                     Final result               Gold Top - SST[[id number]]                                   Final result               Light Blue Top[[id number]]                                   Final result                 Please view results for these tests on the individual orders.   CBC WITH AUTO DIFFERENTIAL   COMPREHENSIVE METABOLIC PANEL   CBC AND DIFFERENTIAL    Narrative:     The following orders were created for panel order CBC & Differential.  Procedure                               Abnormality         Status                     ---------          "                      -----------         ------                     CBC Auto Differential[[id number]]        Abnormal            Final result                 Please view results for these tests on the individual orders.   GREEN TOP   LAVENDER TOP   GOLD TOP - SST   LIGHT BLUE TOP       Vitals Reviewed:    Vitals:    06/14/25 1857 06/14/25 1927 06/14/25 2041 06/14/25 2346   BP: 104/65 107/68 104/57 90/58   BP Location:   Left arm Left arm   Patient Position:   Lying Lying   Pulse: 73 73 76 75   Resp:   18 18   Temp:   97.9 °F (36.6 °C) 98.1 °F (36.7 °C)   TempSrc:   Oral Oral   SpO2: 95% 95% 93% 94%   Weight:   51.1 kg (112 lb 8.7 oz)    Height:   170.2 cm (67\")        MEDICATIONS GIVEN TO PATIENT THIS ENCOUNTER:  Medications   sodium chloride 0.9 % flush 10 mL (has no administration in time range)   ampicillin-sulbactam (UNASYN) 3,000 mg in sodium chloride 0.9 % 100 mL IVPB-VTB (0 mg Intravenous Stopped 6/14/25 2030)   nicotine (NICODERM CQ) 21 MG/24HR patch 1 patch (1 patch Transdermal Medication Applied 6/14/25 2013)   nicotine polacrilex (NICORETTE) gum 2 mg (has no administration in time range)   guaiFENesin (MUCINEX) 12 hr tablet 600 mg (600 mg Oral Given 6/14/25 2013)   methylPREDNISolone sodium succinate (SOLU-Medrol) injection 62.5 mg (has no administration in time range)   ipratropium-albuterol (DUO-NEB) nebulizer solution 3 mL (has no administration in time range)   clopidogrel (PLAVIX) tablet 75 mg (75 mg Oral Not Given 6/14/25 2013)   pantoprazole (PROTONIX) injection 40 mg (40 mg Intravenous Given 6/14/25 2014)   arformoterol (BROVANA) nebulizer solution 15 mcg ( Nebulization Canceled Entry 6/14/25 2130)     And   budesonide (PULMICORT) nebulizer solution 0.5 mg ( Nebulization Canceled Entry 6/14/25 2130)     And   revefenacin (YUPELRI) nebulizer solution 175 mcg ( Nebulization Canceled Entry 6/14/25 1344)   aspirin EC tablet 81 mg (81 mg Oral Not Given 6/14/25 2013)   Pharmacy to Dose enoxaparin " (LOVENOX) (has no administration in time range)   enoxaparin sodium (LOVENOX) syringe 40 mg (40 mg Subcutaneous Not Given 6/14/25 2046)   methylPREDNISolone sodium succinate (SOLU-Medrol) injection 125 mg (125 mg Intravenous Given 6/14/25 1446)   iopamidol (ISOVUE-300) 61 % injection 100 mL (100 mL Intravenous Given 6/14/25 1622)   ipratropium-albuterol (DUO-NEB) nebulizer solution 3 mL (3 mL Nebulization Given 6/14/25 1710)       CONSULTS:  None    CRITICAL CARE:  There was significant risk of life threatening deterioration of patient's condition requiring my direct management. Critical care time 0 minutes, excluding any documented procedures.    FINAL IMPRESSION      1. COPD with acute exacerbation          DISPOSITION / PLAN     ED Disposition       ED Disposition   Decision to Admit    Condition   --    Comment   Level of Care: Telemetry [5]   Diagnosis: COPD with acute exacerbation [665011]   Admitting Physician: AL HAINES [[id number]]                 PATIENT REFERRED TO:  No follow-up provider specified.    DISCHARGE MEDICATIONS:     Medication List        ASK your doctor about these medications      albuterol sulfate  (90 Base) MCG/ACT inhaler  Commonly known as: PROVENTIL HFA;VENTOLIN HFA;PROAIR HFA  Inhale 2 puffs Every 4 (Four) Hours As Needed for wheezing.     aspirin 81 MG EC tablet  Take 1 tablet by mouth Daily.     Breztri Aerosphere 160-9-4.8 MCG/ACT aerosol inhaler  Generic drug: Budeson-Glycopyrrol-Formoterol  Inhale 2 puffs by mouth  2 (Two) Times a Day. Rinse mouth after use     carvedilol 25 MG tablet  Commonly known as: COREG  Take 1 tablet by mouth 2 (Two) Times a Day.     cloNIDine 0.3 MG/24HR patch  Commonly known as: CATAPRES-TTS  Place 1 patch on the skin 1 (One) Time Per Week.     clopidogrel 75 MG tablet  Commonly known as: PLAVIX  Take 1 tablet by mouth Daily.     CoQ10 100 MG capsule  Take 1 capsule (100 mg total) by mouth daily for 90 days.     famotidine 10 MG tablet  Commonly  known as: Heartburn Relief  Take 2 tablets by mouth Daily.     hydroCHLOROthiazide 25 MG tablet  Take 1 tablet by mouth Daily.     ipratropium-albuterol 0.5-2.5 mg/3 ml nebulizer  Commonly known as: DUO-NEB  Inhale contents of 1 vial via nebulizer every 6 (six) hours as needed for wheezing     * lisinopril 40 MG tablet  Commonly known as: PRINIVIL,ZESTRIL  Take 1 tablet by mouth Daily.     * lisinopril 20 MG tablet  Commonly known as: PRINIVIL,ZESTRIL  Take 1 tablet by mouth Daily for 90 days.     magnesium (as) gluconate 500 (27 Mg) MG tablet  Commonly known as: MAGONATE  Take 1 tablet by mouth Daily.     simvastatin 80 MG tablet  Commonly known as: ZOCOR  Take 1 tablet by mouth Every Night.     Tumersaid tablet     vitamin D 1.25 MG (76844 UT) capsule capsule  Commonly known as: ERGOCALCIFEROL     Vitamin D 50 MCG (2000 UT) tablet           * This list has 2 medication(s) that are the same as other medications prescribed for you. Read the directions carefully, and ask your doctor or other care provider to review them with you.                  Electronically signed by Rodrigo Perez DO, 06/14/25, 1:38 PM EDT.    Emergency Medicine Physician  [facility]  (Please note that portions of thisnote were completed with a voice recognition program.  Efforts were made to edit the dictations but occasionally words are mis-transcribed.)       Rodrigo Perez DO  06/15/25 0024

## 2025-06-15 LAB
ALBUMIN SERPL-MCNC: 3.4 G/DL (ref 3.5–5.2)
ALBUMIN/GLOB SERPL: 1.3 G/DL
ALP SERPL-CCNC: 67 U/L (ref 39–117)
ALT SERPL W P-5'-P-CCNC: 14 U/L (ref 1–41)
ANION GAP SERPL CALCULATED.3IONS-SCNC: 10.2 MMOL/L (ref 5–15)
AST SERPL-CCNC: 13 U/L (ref 1–40)
BASOPHILS # BLD AUTO: 0 10*3/MM3 (ref 0–0.2)
BASOPHILS NFR BLD AUTO: 0 % (ref 0–1.5)
BILIRUB SERPL-MCNC: 0.4 MG/DL (ref 0–1.2)
BUN SERPL-MCNC: 16 MG/DL (ref 8–23)
BUN/CREAT SERPL: 28.6 (ref 7–25)
CALCIUM SPEC-SCNC: 8.8 MG/DL (ref 8.6–10.5)
CHLORIDE SERPL-SCNC: 101 MMOL/L (ref 98–107)
CO2 SERPL-SCNC: 25.8 MMOL/L (ref 22–29)
CREAT SERPL-MCNC: 0.56 MG/DL (ref 0.76–1.27)
DEPRECATED RDW RBC AUTO: 43.9 FL (ref 37–54)
EGFRCR SERPLBLD CKD-EPI 2021: 106 ML/MIN/1.73
EOSINOPHIL # BLD AUTO: 0 10*3/MM3 (ref 0–0.4)
EOSINOPHIL NFR BLD AUTO: 0 % (ref 0.3–6.2)
ERYTHROCYTE [DISTWIDTH] IN BLOOD BY AUTOMATED COUNT: 12.7 % (ref 12.3–15.4)
GLOBULIN UR ELPH-MCNC: 2.6 GM/DL
GLUCOSE SERPL-MCNC: 125 MG/DL (ref 65–99)
HCT VFR BLD AUTO: 36.8 % (ref 37.5–51)
HGB BLD-MCNC: 13.4 G/DL (ref 13–17.7)
IMM GRANULOCYTES # BLD AUTO: 0.04 10*3/MM3 (ref 0–0.05)
IMM GRANULOCYTES NFR BLD AUTO: 0.6 % (ref 0–0.5)
LYMPHOCYTES # BLD AUTO: 0.66 10*3/MM3 (ref 0.7–3.1)
LYMPHOCYTES NFR BLD AUTO: 9.8 % (ref 19.6–45.3)
MCH RBC QN AUTO: 34.3 PG (ref 26.6–33)
MCHC RBC AUTO-ENTMCNC: 36.4 G/DL (ref 31.5–35.7)
MCV RBC AUTO: 94.1 FL (ref 79–97)
MONOCYTES # BLD AUTO: 0.12 10*3/MM3 (ref 0.1–0.9)
MONOCYTES NFR BLD AUTO: 1.8 % (ref 5–12)
NEUTROPHILS NFR BLD AUTO: 5.92 10*3/MM3 (ref 1.7–7)
NEUTROPHILS NFR BLD AUTO: 87.8 % (ref 42.7–76)
NRBC BLD AUTO-RTO: 0 /100 WBC (ref 0–0.2)
PLATELET # BLD AUTO: 269 10*3/MM3 (ref 140–450)
PMV BLD AUTO: 8.4 FL (ref 6–12)
POTASSIUM SERPL-SCNC: 3.7 MMOL/L (ref 3.5–5.2)
PROT SERPL-MCNC: 6 G/DL (ref 6–8.5)
RBC # BLD AUTO: 3.91 10*6/MM3 (ref 4.14–5.8)
SODIUM SERPL-SCNC: 137 MMOL/L (ref 136–145)
WBC NRBC COR # BLD AUTO: 6.74 10*3/MM3 (ref 3.4–10.8)

## 2025-06-15 PROCEDURE — G0378 HOSPITAL OBSERVATION PER HR: HCPCS

## 2025-06-15 PROCEDURE — 94799 UNLISTED PULMONARY SVC/PX: CPT

## 2025-06-15 PROCEDURE — 94761 N-INVAS EAR/PLS OXIMETRY MLT: CPT

## 2025-06-15 PROCEDURE — 25010000002 ENOXAPARIN PER 10 MG: Performed by: EMERGENCY MEDICINE

## 2025-06-15 PROCEDURE — 25010000002 AMPICILLIN-SULBACTAM PER 1.5 G: Performed by: STUDENT IN AN ORGANIZED HEALTH CARE EDUCATION/TRAINING PROGRAM

## 2025-06-15 PROCEDURE — 94664 DEMO&/EVAL PT USE INHALER: CPT

## 2025-06-15 PROCEDURE — 63710000001 PREDNISONE PER 1 MG: Performed by: STUDENT IN AN ORGANIZED HEALTH CARE EDUCATION/TRAINING PROGRAM

## 2025-06-15 PROCEDURE — 96372 THER/PROPH/DIAG INJ SC/IM: CPT

## 2025-06-15 PROCEDURE — 97162 PT EVAL MOD COMPLEX 30 MIN: CPT

## 2025-06-15 PROCEDURE — 85025 COMPLETE CBC W/AUTO DIFF WBC: CPT | Performed by: STUDENT IN AN ORGANIZED HEALTH CARE EDUCATION/TRAINING PROGRAM

## 2025-06-15 PROCEDURE — 99232 SBSQ HOSP IP/OBS MODERATE 35: CPT | Performed by: STUDENT IN AN ORGANIZED HEALTH CARE EDUCATION/TRAINING PROGRAM

## 2025-06-15 PROCEDURE — 80053 COMPREHEN METABOLIC PANEL: CPT | Performed by: STUDENT IN AN ORGANIZED HEALTH CARE EDUCATION/TRAINING PROGRAM

## 2025-06-15 RX ORDER — POLYETHYLENE GLYCOL 3350 17 G/17G
17 POWDER, FOR SOLUTION ORAL DAILY PRN
Status: DISCONTINUED | OUTPATIENT
Start: 2025-06-15 | End: 2025-06-16 | Stop reason: HOSPADM

## 2025-06-15 RX ORDER — AMOXICILLIN 250 MG
2 CAPSULE ORAL 2 TIMES DAILY
Status: DISCONTINUED | OUTPATIENT
Start: 2025-06-15 | End: 2025-06-16 | Stop reason: HOSPADM

## 2025-06-15 RX ORDER — ALBUTEROL SULFATE 0.83 MG/ML
2.5 SOLUTION RESPIRATORY (INHALATION) EVERY 6 HOURS PRN
Status: DISCONTINUED | OUTPATIENT
Start: 2025-06-15 | End: 2025-06-16 | Stop reason: HOSPADM

## 2025-06-15 RX ORDER — BISACODYL 5 MG/1
5 TABLET, DELAYED RELEASE ORAL DAILY PRN
Status: DISCONTINUED | OUTPATIENT
Start: 2025-06-15 | End: 2025-06-16 | Stop reason: HOSPADM

## 2025-06-15 RX ORDER — ATORVASTATIN CALCIUM 40 MG/1
40 TABLET, FILM COATED ORAL DAILY
Status: DISCONTINUED | OUTPATIENT
Start: 2025-06-15 | End: 2025-06-16 | Stop reason: HOSPADM

## 2025-06-15 RX ORDER — PREDNISONE 20 MG/1
40 TABLET ORAL
Status: DISCONTINUED | OUTPATIENT
Start: 2025-06-15 | End: 2025-06-16 | Stop reason: HOSPADM

## 2025-06-15 RX ORDER — BISACODYL 10 MG
10 SUPPOSITORY, RECTAL RECTAL DAILY PRN
Status: DISCONTINUED | OUTPATIENT
Start: 2025-06-15 | End: 2025-06-16 | Stop reason: HOSPADM

## 2025-06-15 RX ADMIN — NICOTINE 1 PATCH: 21 PATCH TRANSDERMAL at 20:33

## 2025-06-15 RX ADMIN — ASPIRIN 81 MG: 81 TABLET, COATED ORAL at 08:26

## 2025-06-15 RX ADMIN — ENOXAPARIN SODIUM 40 MG: 100 INJECTION SUBCUTANEOUS at 20:38

## 2025-06-15 RX ADMIN — AMPICILLIN SODIUM AND SULBACTAM SODIUM 3000 MG: 2; 1 INJECTION, POWDER, FOR SOLUTION INTRAMUSCULAR; INTRAVENOUS at 01:10

## 2025-06-15 RX ADMIN — BUDESONIDE 0.5 MG: 0.5 SUSPENSION RESPIRATORY (INHALATION) at 19:26

## 2025-06-15 RX ADMIN — DOCUSATE SODIUM 50MG AND SENNOSIDES 8.6MG 2 TABLET: 8.6; 5 TABLET, FILM COATED ORAL at 20:31

## 2025-06-15 RX ADMIN — PREDNISONE 40 MG: 20 TABLET ORAL at 08:27

## 2025-06-15 RX ADMIN — CLOPIDOGREL BISULFATE 75 MG: 75 TABLET, FILM COATED ORAL at 08:26

## 2025-06-15 RX ADMIN — GUAIFENESIN 600 MG: 600 TABLET, MULTILAYER, EXTENDED RELEASE ORAL at 08:26

## 2025-06-15 RX ADMIN — GUAIFENESIN 600 MG: 600 TABLET, MULTILAYER, EXTENDED RELEASE ORAL at 20:31

## 2025-06-15 RX ADMIN — ARFORMOTEROL TARTRATE 15 MCG: 15 SOLUTION RESPIRATORY (INHALATION) at 07:13

## 2025-06-15 RX ADMIN — ATORVASTATIN CALCIUM 40 MG: 40 TABLET, FILM COATED ORAL at 08:26

## 2025-06-15 RX ADMIN — Medication 10 ML: at 20:32

## 2025-06-15 RX ADMIN — DOCUSATE SODIUM 50MG AND SENNOSIDES 8.6MG 2 TABLET: 8.6; 5 TABLET, FILM COATED ORAL at 11:43

## 2025-06-15 RX ADMIN — BUDESONIDE 0.5 MG: 0.5 SUSPENSION RESPIRATORY (INHALATION) at 07:13

## 2025-06-15 RX ADMIN — ARFORMOTEROL TARTRATE 15 MCG: 15 SOLUTION RESPIRATORY (INHALATION) at 19:26

## 2025-06-15 NOTE — CASE MANAGEMENT/SOCIAL WORK
Discharge Planning Assessment   [place]     Patient Name: Lebron Miller  MRN: [id number]  Today's Date: 6/15/2025    Admit Date: 6/14/2025    Plan: Return Home   Discharge Needs Assessment       Row Name 06/15/25 1522       Living Environment    People in Home child(ron), adult    Current Living Arrangements home    Potentially Unsafe Housing Conditions none    Primary Care Provided by self    Provides Primary Care For no one    Family Caregiver if Needed child(ron), adult    Quality of Family Relationships helpful    Able to Return to Prior Arrangements yes       Resource/Environmental Concerns    Resource/Environmental Concerns financial    Transportation Concerns none       Transition Planning    Patient/Family Anticipates Transition to home with family    Patient/Family Anticipated Services at Transition none    Transportation Anticipated car, drives self;family or friend will provide       Discharge Needs Assessment    Readmission Within the Last 30 Days previous discharge plan unsuccessful    Equipment Currently Used at Home Rolator;nebulizer    Do you want help with school or training? For example, starting or completing job training or getting a high school diploma, GED or equivalent No    Anticipated Changes Related to Illness none    Current Discharge Risk chronically ill                   Discharge Plan       Row Name 06/15/25 1525       Plan    Plan Return Home    Plan Comments Spoke to pt regarding discharge plans Confirmed address phone number and primary care provider as being correct on face sheet . He Lives with hie son Independent with ADLS Has agreed to meds to bed . Last admission Home Health was set up reports did not use due to cost . Has oxygen 2.5 LNC continuously  with [employer] Bathroom is lacking toilet due to alan issue has Sink and shower  May benefit form a BSC  Gave him [facility] book  for a resource                   Selected Continued Care - Episodes Includes continued care and  service providers with selected services from the active episodes listed below          Selected Continued Care - Prior Encounters Includes continued care and service providers with selected services from prior encounters from 3/16/2025 to 6/15/2025      Discharged on 5/22/2025 Admission date: 5/19/2025 - Discharge disposition: Home or Self Care      Durable Medical Equipment       Service Provider Services Address Phone Fax Patient Preferred    [facility], Durable Medical Equipment [address]  [place] [address] [address] [phone] --              Home Medical Care       Service Provider Services Address Phone Fax Patient Preferred    [facility] [address], [address] [address] [phone] --                             Demographic Summary       Row Name 06/15/25 1457       General Information    Admission Type observation    Arrived From emergency department    Required Notices Provided Observation Status Notice    Referral Source admission list    Reason for Consult discharge planning    Preferred Language English                   Functional Status       Row Name 06/15/25 1458       Functional Status    Usual Activity Tolerance good                   Psychosocial    No documentation.                  Abuse/Neglect    No documentation.                  Legal    No documentation.                  Substance Abuse    No documentation.                  Patient Forms    No documentation.                     Joanna Leon RN

## 2025-06-15 NOTE — PLAN OF CARE
"Goal Outcome Evaluation:  Plan of Care Reviewed With: patient           Outcome Evaluation: PT evaluation completed this date with pt presenting supine in bed, on 2.5 L NC (O2 sat at 92%) and having no c/o pain. Pt was O x 4 and was able to provided previous functional level as independent with st cane and 2 L NC O2 inside his home and rollator to go to his doctors appts. Pt was able to come to sit on EOB with modified independence,HOB raised and to sit on EOB independently with a flexed posture. Pt came to stand with min assist and amb with his own st cane 35 ft. On return to from bathroom area pt did c/o shortness of breath and \"feeling weak\" in BLE. Pt's O2 sat at 88% post gait. Pt states he actually breathes better when in bed so pt transferred to bed from chair with CGA using st cane. Pt presents with deficits in strength, endurance, and balance. He is expected to improve his functional mobility with continued PT services prior to d/c.    Anticipated Discharge Disposition (PT): home with home health, home with assist                        "

## 2025-06-15 NOTE — PROGRESS NOTES
[facility]    PROGRESS NOTE    Name:  Lebron Miller   Age:  70 y.o.  Sex:  male  :  1955  MRN:  [id number]   Visit Number:  [id number]  Admission Date:  2025  Date Of Service:  06/15/25  Primary Care Physician:  Natasha Veronica MD     LOS: 0 days :    Chief Complaint:      Shortness of air    Subjective:    Still little short of air, but he said he has been better and he has been worse.  Denies any concerning pain.  Main concern is chronic constipation, has not had a bowel movement in a few days.    Hospital Course:    Lebron Miller is 70-year-old male with history of hypertension, COPD, coronary artery disease status post stents, hyperlipidemia, and multiple admissions for COPD exacerbation who presents with shortness of breath for the past days ago, associated with dry cough but congested.  Patient reports being on 2 L nasal cannula but has now bumped himself up to 2.5, with use of nebulizers and rescue inhaler without relief. Patient is still smoking but reports he is now ready to quit.  Patient also reports a recent reduction in his lisinopril dosing from 40 mg to 20 mg. Patient denies syncopal events or feeling lightheaded.     Review of Systems:     All systems were reviewed and negative except as mentioned in subjective, assessment and plan.    Vital Signs:    Temp:  [97.8 °F (36.6 °C)-98.3 °F (36.8 °C)] 98.3 °F (36.8 °C)  Heart Rate:  [63-76] 63  Resp:  [18] 18  BP: ()/(57-99) 126/73    Intake and output:    I/O last 3 completed shifts:  In: 220 [P.O.:120; IV Piggyback:100]  Out: 100 [Urine:100]  No intake/output data recorded.    Physical Examination:    General Appearance:  Alert and cooperative.    Head:  Atraumatic and normocephalic.   Eyes: Conjunctivae and sclerae normal, no icterus. No pallor.   Throat: No oral lesions, no thrush, oral mucosa moist.   Neck: Supple, trachea midline, no thyromegaly.   Lungs:   Breath sounds heard bilaterally  "equally.  Slight wheezes, moderately diminished all fields.   Heart:  Normal S1 and S2, no murmur, no gallop, no rub. No JVD.   Abdomen:   Normal bowel sounds, no masses, no organomegaly. Soft, nontender, nondistended, no rebound tenderness.   Extremities: Supple, no edema, no cyanosis, no clubbing.   Skin: Warm.   Neurologic: Alert and oriented x 3. No facial asymmetry. Moves all four limbs. No tremors.      Laboratory results:    Results from last 7 days   Lab Units 06/15/25  0548 06/14/25  1324   SODIUM mmol/L 137 139   POTASSIUM mmol/L 3.7 4.5   CHLORIDE mmol/L 101 101   CO2 mmol/L 25.8 30.1*   BUN mg/dL 16.0 12.0   CREATININE mg/dL 0.56* 0.69*   CALCIUM mg/dL 8.8 9.0   BILIRUBIN mg/dL 0.4 0.6   ALK PHOS U/L 67 74   ALT (SGPT) U/L 14 16   AST (SGOT) U/L 13 17   GLUCOSE mg/dL 125* 90     Results from last 7 days   Lab Units 06/15/25  0548 06/14/25  1324   WBC 10*3/mm3 6.74 6.35   HEMOGLOBIN g/dL 13.4 14.2   HEMATOCRIT % 36.8* 40.2   PLATELETS 10*3/mm3 269 295         Results from last 7 days   Lab Units 06/14/25  1439 06/14/25  1324   HSTROP T ng/L 15 15         No results for input(s): \"PHART\", \"ZOT4SOX\", \"PO2ART\", \"JXV0NJP\", \"BASEEXCESS\" in the last 8760 hours.   I have reviewed the patient's laboratory results.    Radiology results:    CT Angiogram Chest Pulmonary Embolism  Result Date: 6/14/2025  FINAL REPORT TECHNIQUE: null CLINICAL HISTORY: Pulmonary Embolism COMPARISON: null FINDINGS: CT angiography chest with contrast. 3D Postprocessing. Comparison: 05/19/2025 Findings: The pulmonary arteries are well opacified. Central pulmonary artery dilated at 3.4 cm. Scattered plaque in the thoracic aorta without aneurysm or dissection. Heart is not enlarged. Trace anterior pericardial effusion. Several calcified mediastinal and right hilar lymph nodes. The thyroid is unremarkable. Lungs are emphysematous. No focal airspace consolidation, pleural effusion, or pneumothorax. No worrisome pulmonary masses or nodules. " Calcified granulomas in the spleen. Hypodense bilateral renal cysts. No acute fractures.     Impression: IMPRESSION: 1. No pulmonary emboli. 2. Emphysematous lungs without acute parenchymal lung disease. 3. Dilatation of the central pulmonary artery may suggest a degree of pulmonary arterial hypertension. 4. Trace anterior pericardial effusion. Authenticated and Electronically Signed by Kevin Gross MD on 06/14/2025 06:13:01 PM    I have reviewed the patient's radiology reports.    Medication Review:     I have reviewed the patient's active and prn medications.     Problem List:      COPD with acute exacerbation    Acute-on-chronic respiratory failure      Assessment/Plan:    Observation General Floor admission 6/14/2025 with COPD exacerbation.    COPD exacerbation  Stable on 2 L.  Prednisone.  DuoNebs as needed.  Recent rhinovirus infection 5/20/2025.     Chronic: hypertension, COPD, coronary artery disease status post stents, hyperlipidemia, and multiple admissions for COPD exacerbation, constipation  Hold antihypertensives.  Blood pressure soft at times.  Bowel regimen for constipation.    DVT Prophylaxis: Lovenox  Code Status: Full code  Diet: Cardiac  Discharge Plan: At least 1 more day depending on clinical status.    Brian Joseph Kerley, DO  06/15/25  07:41 EDT    Dictated utilizing Dragon dictation.

## 2025-06-15 NOTE — THERAPY EVALUATION
Patient Name: Lebron Miller  : 1955    MRN: [id number]                              Today's Date: 6/15/2025       Admit Date: 2025    Visit Dx:     ICD-10-CM ICD-9-CM   1. COPD with acute exacerbation  J44.1 491.21     Patient Active Problem List   Diagnosis    Atopic rhinitis    Chronic coronary artery disease    Arthritis    Disc disorder of cervical region    Hyperlipidemia    Hypertension    Psoriasis    Tobacco abuse    Vitamin D deficiency    Moderate single current episode of major depressive disorder    Screening PSA (prostate specific antigen)    Encounter for Medicare annual wellness exam    Acute exacerbation of chronic obstructive pulmonary disease (COPD)    Acute-on-chronic respiratory failure    COPD with acute exacerbation     Past Medical History:   Diagnosis Date    Allergic rhinitis     Arthritis     Hypertension     Myocardial infarction     Psoriasis     Skin cancer      Past Surgical History:   Procedure Laterality Date    CORONARY STENT PLACEMENT        General Information       Row Name 06/15/25 1510          Physical Therapy Time and Intention    Document Type evaluation  -TW     Mode of Treatment physical therapy  -TW       Row Name 06/15/25 1510          General Information    Patient Profile Reviewed yes  -TW     Prior Level of Function independent:;all household mobility  pt primarily uses st cane within his home for amb. To go to his doctor a rollator. Pt states he has been on home O2 2L since last hospital d/c. Has shower seat.  -TW     Existing Precautions/Restrictions fall;oxygen therapy device and L/min;cardiac  -TW     Barriers to Rehab medically complex;previous functional deficit  -TW       Row Name 06/15/25 1514          Living Environment    Current Living Arrangements home  -TW     People in Home child(ron), adult  -TW       Row Name 06/15/25 1510          Home Main Entrance    Number of Stairs, Main Entrance two  -TW     Stair Railings, Main Entrance railings on  both sides of stairs  -TW       Row Name 06/15/25 1510          Stairs Within Home, Primary    Number of Stairs, Within Home, Primary none  -TW       Row Name 06/15/25 1510          Cognition    Orientation Status (Cognition) oriented x 4  -TW       Row Name 06/15/25 1510          Safety Issues/Impairments Affecting Functional Mobility    Safety Issues Affecting Function (Mobility) safety precaution awareness;safety precautions follow-through/compliance  -TW     Impairments Affecting Function (Mobility) shortness of breath;endurance/activity tolerance;strength;balance  -TW               User Key  (r) = Recorded By, (t) = Taken By, (c) = Cosigned By      Initials Name Provider Type    TW Radha Arellano, PT Physical Therapist                   Mobility       Row Name 06/15/25 1510          Bed Mobility    Bed Mobility supine-sit;sit-supine  -TW     Supine-Sit [place] (Bed Mobility) modified independence  -TW     Sit-Supine [place] (Bed Mobility) modified independence  -TW     Assistive Device (Bed Mobility) head of bed elevated  -TW     Comment, (Bed Mobility) Pt was able to sit on EOB without any support x 5 minutes.  -       Row Name 06/15/25 1510          Bed-Chair Transfer    Bed-Chair [place] (Transfers) contact guard;verbal cues  -TW     Assistive Device (Bed-Chair Transfers) cane, straight  -       Row Name 06/15/25 1510          Sit-Stand Transfer    Sit-Stand [place] (Transfers) minimum assist (75% patient effort)  -TW     Assistive Device (Sit-Stand Transfers) cane, straight  -TW       Row Name 06/15/25 1510          Gait/Stairs (Locomotion)    [place] Level (Gait) contact guard;verbal cues  -TW     Assistive Device (Gait) cane, straight  -TW     Patient was able to Ambulate yes  -TW     Distance in Feet (Gait) 35  -TW     Deviations/Abnormal Patterns (Gait) gait speed decreased;base of support, narrow;festinating/shuffling  -TW     Bilateral Gait Deviations forward flexed  posture;heel strike decreased  -TW     Comment, (Gait/Stairs) Post ambulation pt was very short of breath with O2 sat at 88% and slow rise to 90%.  -TW               User Key  (r) = Recorded By, (t) = Taken By, (c) = Cosigned By      Initials Name Provider Type    TW Radha Arellano, JADEN Physical Therapist                   Obj/Interventions       Row Name 06/15/25 1510          Range of Motion Comprehensive    Comment, General Range of Motion BLE grossly WFLs  -TW       Row Name 06/15/25 1510          Strength Comprehensive (MMT)    Comment, General Manual Muscle Testing (MMT) Assessment BLE grossly 3+/5 to 4/5  -TW       Row Name 06/15/25 1510          Balance    Balance Assessment sitting static balance;sitting dynamic balance;standing static balance;standing dynamic balance  -TW     Static Sitting Balance supervision  -TW     Dynamic Sitting Balance supervision  -TW     Position, Sitting Balance unsupported;sitting edge of bed  -TW     Static Standing Balance contact guard  -TW     Dynamic Standing Balance contact guard  -TW     Position/Device Used, Standing Balance cane, straight  -TW               User Key  (r) = Recorded By, (t) = Taken By, (c) = Cosigned By      Initials Name Provider Type    TW Radha Arellano, PT Physical Therapist                   Goals/Plan       Row Name 06/15/25 1510          Bed Mobility Goal 1 (PT)    Activity/Assistive Device (Bed Mobility Goal 1, PT) bed mobility activities, all  -TW     [place] Level/Cues Needed (Bed Mobility Goal 1, PT) modified independence  -TW     Time Frame (Bed Mobility Goal 1, PT) short term goal (STG);3 days  -TW     Progress/Outcomes (Bed Mobility Goal 1, PT) goal ongoing  -TW       Row Name 06/15/25 1510          Transfer Goal 1 (PT)    Activity/Assistive Device (Transfer Goal 1, PT) sit-to-stand/stand-to-sit;bed-to-chair/chair-to-bed;toilet;cane, straight  -TW     [place] Level/Cues Needed (Transfer Goal 1, PT) standby assist  -TW     Time Frame  "(Transfer Goal 1, PT) long term goal (LTG);10 days  -TW     Progress/Outcome (Transfer Goal 1, PT) goal ongoing  -TW       Row Name 06/15/25 1510          Gait Training Goal 1 (PT)    Activity/Assistive Device (Gait Training Goal 1, PT) gait (walking locomotion);cane, straight;decrease fall risk;improve balance and speed;increase endurance/gait distance  -TW     [place] Level (Gait Training Goal 1, PT) standby assist  -TW     Distance (Gait Training Goal 1, PT) 100 ft x 2  -TW     Time Frame (Gait Training Goal 1, PT) long term goal (LTG);10 days  -TW     Progress/Outcome (Gait Training Goal 1, PT) goal ongoing  -TW       Row Name 06/15/25 1510          Patient Education Goal (PT)    Activity (Patient Education Goal, PT) BLE ther ex 1 x 10  -TW     [place]/Cues/Accuracy (Memory Goal 2, PT) demonstrates adequately  -TW     Time Frame (Patient Education Goal, PT) long term goal (LTG);10 days  -TW     Progress/Outcome (Patient Education Goal, PT) goal ongoing  -TW       Row Name 06/15/25 1510          Therapy Assessment/Plan (PT)    Planned Therapy Interventions (PT) balance training;bed mobility training;gait training;transfer training;strengthening;patient/family education  -TW               User Key  (r) = Recorded By, (t) = Taken By, (c) = Cosigned By      Initials Name Provider Type    TW Radha Arellano, PT Physical Therapist                   Clinical Impression       Row Name 06/15/25 1510          Pain    Pretreatment Pain Rating 0/10 - no pain  -TW     Posttreatment Pain Rating 0/10 - no pain  -TW     Pre/Posttreatment Pain Comment pt did not have any pain at rest. Pt was noted to have c/o shortness of breath post ambuation 35 ft and of feeling \"weak\"  -TW       Row Name 06/15/25 1510          Plan of Care Review    Plan of Care Reviewed With patient  -TW     Outcome Evaluation PT evaluation completed this date with pt presenting supine in bed, on 2.5 L NC (O2 sat at 92%) and having no c/o pain. Pt " "was O x 4 and was able to provided previous functional level as independent with st cane and 2 L NC O2 inside his home and rollator to go to his doctors appts. Pt was able to come to sit on EOB with modified independence,HOB raised and to sit on EOB independently with a flexed posture. Pt came to stand with min assist and amb with his own st cane 35 ft. On return to from bathroom area pt did c/o shortness of breath and \"feeling weak\" in BLE. Pt's O2 sat at 88% post gait. Pt states he actually breathes better when in bed so pt transferred to bed from chair with CGA using st cane. Pt presents with deficits in strength, endurance, and balance. He is expected to improve his functional mobility with continued PT services prior to d/c.  -TW       Row Name 06/15/25 1510          Therapy Assessment/Plan (PT)    Patient/Family Therapy Goals Statement (PT) to return home with his adult children  -TW     Rehab Potential (PT) good  -TW     Criteria for Skilled Interventions Met (PT) yes;meets criteria  -TW     Therapy Frequency (PT) 5 times/wk  -TW     Predicted Duration of Therapy Intervention (PT) 10 days  -TW       Row Name 06/15/25 1510          Vital Signs    Pre SpO2 (%) 92  -TW     O2 Delivery Pre Treatment supplemental O2  2.5 L  -TW     Intra SpO2 (%) 88  -TW     O2 Delivery Intra Treatment supplemental O2  2.5 L  -TW     Post SpO2 (%) 91  -TW     O2 Delivery Post Treatment supplemental O2  2.5 L  -TW     Pre Patient Position Supine  -TW     Intra Patient Position Standing  -TW     Post Patient Position Supine  -TW       Row Name 06/15/25 1510          Positioning and Restraints    Pre-Treatment Position in bed  -TW     Post Treatment Position bed  -TW     In Bed fowlers;side rails up x3;notified nsg;encouraged to call for assist;call light within reach  -TW               User Key  (r) = Recorded By, (t) = Taken By, (c) = Cosigned By      Initials Name Provider Type    TW Radha Arellano, PT Physical Therapist         "           Outcome Measures       Row Name 06/15/25 1510 06/15/25 0800       How much help from another person do you currently need...    Turning from your back to your side while in flat bed without using bedrails? 3  -TW 3  -KK    Moving from lying on back to sitting on the side of a flat bed without bedrails? 3  -TW 3  -KK    Moving to and from a bed to a chair (including a wheelchair)? 3  -TW 3  -KK    Standing up from a chair using your arms (e.g., wheelchair, bedside chair)? 3  -TW 3  -KK    Climbing 3-5 steps with a railing? 2  -TW 2  -KK    To walk in hospital room? 3  -TW 3  -KK    AM-PAC 6 Clicks Score (PT) 17  -TW 17  -KK    Highest Level of Mobility Goal Stand (1 or More Minutes)-5  -TW Stand (1 or More Minutes)-5  -KK      Row Name 06/15/25 1510          Functional Assessment    Outcome Measure Options AM-PAC 6 Clicks Basic Mobility (PT)  -               User Key  (r) = Recorded By, (t) = Taken By, (c) = Cosigned By      Initials Name Provider Type    TW Radha Arellano PT Physical Therapist    Evelina Chau, RN Registered Nurse                                 Physical Therapy Education       Title: PT OT SLP Therapies (In Progress)       Topic: Physical Therapy (In Progress)       Point: Mobility training (Done)       Learning Progress Summary            Patient Acceptance, E, VU by  at 6/15/2025 1510    Comment: Pt education for purpose of PT evaluation and his POC                      Point: Home exercise program (Not Started)       Learner Progress:  Not documented in this visit.              Point: Body mechanics (Not Started)       Learner Progress:  Not documented in this visit.              Point: Precautions (Not Started)       Learner Progress:  Not documented in this visit.                              User Key       Initials Effective Dates Name Provider Type Discipline     06/16/21 -  Radha Arellano, PT Physical Therapist PT                  PT Recommendation and Plan  Planned  "Therapy Interventions (PT): balance training, bed mobility training, gait training, transfer training, strengthening, patient/family education  Outcome Evaluation: PT evaluation completed this date with pt presenting supine in bed, on 2.5 L NC (O2 sat at 92%) and having no c/o pain. Pt was O x 4 and was able to provided previous functional level as independent with st cane and 2 L NC O2 inside his home and rollator to go to his doctors appts. Pt was able to come to sit on EOB with modified independence,HOB raised and to sit on EOB independently with a flexed posture. Pt came to stand with min assist and amb with his own st cane 35 ft. On return to from bathroom area pt did c/o shortness of breath and \"feeling weak\" in BLE. Pt's O2 sat at 88% post gait. Pt states he actually breathes better when in bed so pt transferred to bed from chair with CGA using st cane. Pt presents with deficits in strength, endurance, and balance. He is expected to improve his functional mobility with continued PT services prior to d/c.     Time Calculation:   PT Evaluation Complexity  History, PT Evaluation Complexity: 3 or more personal factors and/or comorbidities  Examination of Body Systems (PT Eval Complexity): total of 3 or more elements  Clinical Presentation (PT Evaluation Complexity): evolving  Clinical Decision Making (PT Evaluation Complexity): moderate complexity  Overall Complexity (PT Evaluation Complexity): moderate complexity     PT Charges       Row Name 06/15/25 1510             Time Calculation    Stop Time 1510  -TW      PT Received On 06/15/25  -TW      PT Goal Re-Cert Due Date 06/25/25 -TW                User Key  (r) = Recorded By, (t) = Taken By, (c) = Cosigned By      Initials Name Provider Type    TW Radha Arellano PT Physical Therapist                  Therapy Charges for Today       Code Description Service Date Service Provider Modifiers Qty    [id number]  PT EVAL MOD COMPLEXITY 3 6/15/2025 Radha Arellano PT " GP 1            PT G-Codes  Outcome Measure Options: AM-PAC 6 Clicks Basic Mobility (PT)  AM-PAC 6 Clicks Score (PT): 17  PT Discharge Summary  Anticipated Discharge Disposition (PT): home with home health, home with assist    Radha Arellano, PT  6/15/2025

## 2025-06-15 NOTE — PLAN OF CARE
Goal Outcome Evaluation:  Plan of Care Reviewed With: patient        Progress: improving  Outcome Evaluation: Vs stable. Oxygen increased twice during shift to 3Lnc. no significant events, continuing to monitor.

## 2025-06-15 NOTE — PLAN OF CARE
Problem: Adult Inpatient Plan of Care  Goal: Plan of Care Review  Outcome: Progressing  Goal: Patient-Specific Goal (Individualized)  Outcome: Progressing  Goal: Absence of Hospital-Acquired Illness or Injury  Outcome: Progressing  Intervention: Identify and Manage Fall Risk  Recent Flowsheet Documentation  Taken 6/15/2025 0000 by Lake Alvarez RN  Safety Promotion/Fall Prevention: safety round/check completed  Taken 6/14/2025 2200 by Lake Alvarez RN  Safety Promotion/Fall Prevention: safety round/check completed  Taken 6/14/2025 2102 by Lake Alvarez RN  Safety Promotion/Fall Prevention: safety round/check completed  Intervention: Prevent Skin Injury  Recent Flowsheet Documentation  Taken 6/15/2025 0000 by Lake Alvarez RN  Body Position: position changed independently  Taken 6/14/2025 2200 by Lake Alvarez RN  Body Position: position changed independently  Taken 6/14/2025 2102 by Lake Alvarez RN  Body Position: position changed independently  Goal: Optimal Comfort and Wellbeing  Outcome: Progressing  Intervention: Provide Person-Centered Care  Recent Flowsheet Documentation  Taken 6/14/2025 2102 by Lake Alvarez RN  Trust Relationship/Rapport:   care explained   choices provided   emotional support provided   empathic listening provided   questions answered   questions encouraged   reassurance provided   thoughts/feelings acknowledged  Goal: Readiness for Transition of Care  Outcome: Progressing  Intervention: Mutually Develop Transition Plan  Recent Flowsheet Documentation  Taken 6/14/2025 2055 by Lake Alvarez RN  Transportation Anticipated:   car, drives self   family or friend will provide  Patient/Family Anticipated Services at Transition: none  Patient/Family Anticipates Transition to: home with family  Taken 6/14/2025 2051 by Lake Alvarez RN  Equipment Currently Used at Home:   cane, straight   nebulizer   oxygen     Problem: Skin Injury Risk Increased  Goal: Skin Health and  Integrity  Outcome: Progressing  Intervention: Optimize Skin Protection  Recent Flowsheet Documentation  Taken 6/15/2025 0000 by Lake Alvarez RN  Activity Management: activity minimized  Head of Bed (HOB) Positioning: HOB elevated  Taken 6/14/2025 2200 by Lake Alvarez RN  Activity Management: activity minimized  Head of Bed (HOB) Positioning: HOB elevated  Taken 6/14/2025 2102 by Lake Alvarez RN  Activity Management: activity minimized  Head of Bed (HOB) Positioning: HOB elevated   Goal Outcome Evaluation:

## 2025-06-16 VITALS
RESPIRATION RATE: 16 BRPM | SYSTOLIC BLOOD PRESSURE: 142 MMHG | WEIGHT: 112.55 LBS | OXYGEN SATURATION: 99 % | BODY MASS INDEX: 17.66 KG/M2 | HEIGHT: 67 IN | TEMPERATURE: 98 F | HEART RATE: 66 BPM | DIASTOLIC BLOOD PRESSURE: 83 MMHG

## 2025-06-16 PROCEDURE — 94799 UNLISTED PULMONARY SVC/PX: CPT

## 2025-06-16 PROCEDURE — 63710000001 PREDNISONE PER 1 MG: Performed by: STUDENT IN AN ORGANIZED HEALTH CARE EDUCATION/TRAINING PROGRAM

## 2025-06-16 PROCEDURE — 63710000001 REVEFENACIN 175 MCG/3ML SOLUTION: Performed by: STUDENT IN AN ORGANIZED HEALTH CARE EDUCATION/TRAINING PROGRAM

## 2025-06-16 PROCEDURE — 94761 N-INVAS EAR/PLS OXIMETRY MLT: CPT

## 2025-06-16 PROCEDURE — G0378 HOSPITAL OBSERVATION PER HR: HCPCS

## 2025-06-16 PROCEDURE — 94664 DEMO&/EVAL PT USE INHALER: CPT

## 2025-06-16 PROCEDURE — 99239 HOSP IP/OBS DSCHRG MGMT >30: CPT | Performed by: STUDENT IN AN ORGANIZED HEALTH CARE EDUCATION/TRAINING PROGRAM

## 2025-06-16 RX ORDER — PREDNISONE 20 MG/1
40 TABLET ORAL
Qty: 6 TABLET | Refills: 0 | Status: SHIPPED | OUTPATIENT
Start: 2025-06-17 | End: 2025-06-20

## 2025-06-16 RX ORDER — IPRATROPIUM BROMIDE AND ALBUTEROL SULFATE 2.5; .5 MG/3ML; MG/3ML
3 SOLUTION RESPIRATORY (INHALATION) EVERY 6 HOURS PRN
Status: DISCONTINUED | OUTPATIENT
Start: 2025-06-16 | End: 2025-06-16 | Stop reason: HOSPADM

## 2025-06-16 RX ORDER — HYDROCHLOROTHIAZIDE 25 MG/1
25 TABLET ORAL DAILY
Status: DISCONTINUED | OUTPATIENT
Start: 2025-06-16 | End: 2025-06-16 | Stop reason: HOSPADM

## 2025-06-16 RX ORDER — CARVEDILOL 25 MG/1
25 TABLET ORAL 2 TIMES DAILY
Status: DISCONTINUED | OUTPATIENT
Start: 2025-06-16 | End: 2025-06-16 | Stop reason: HOSPADM

## 2025-06-16 RX ADMIN — PREDNISONE 40 MG: 20 TABLET ORAL at 09:23

## 2025-06-16 RX ADMIN — CLOPIDOGREL BISULFATE 75 MG: 75 TABLET, FILM COATED ORAL at 09:23

## 2025-06-16 RX ADMIN — BUDESONIDE 0.5 MG: 0.5 SUSPENSION RESPIRATORY (INHALATION) at 07:00

## 2025-06-16 RX ADMIN — POLYETHYLENE GLYCOL 3350 17 G: 17 POWDER, FOR SOLUTION ORAL at 09:23

## 2025-06-16 RX ADMIN — ATORVASTATIN CALCIUM 40 MG: 40 TABLET, FILM COATED ORAL at 09:23

## 2025-06-16 RX ADMIN — GUAIFENESIN 600 MG: 600 TABLET, MULTILAYER, EXTENDED RELEASE ORAL at 09:23

## 2025-06-16 RX ADMIN — DOCUSATE SODIUM 50MG AND SENNOSIDES 8.6MG 2 TABLET: 8.6; 5 TABLET, FILM COATED ORAL at 09:23

## 2025-06-16 RX ADMIN — CARVEDILOL 25 MG: 25 TABLET, FILM COATED ORAL at 09:23

## 2025-06-16 RX ADMIN — ASPIRIN 81 MG: 81 TABLET, COATED ORAL at 09:23

## 2025-06-16 RX ADMIN — REVEFENACIN 175 MCG: 175 SOLUTION RESPIRATORY (INHALATION) at 06:59

## 2025-06-16 RX ADMIN — ARFORMOTEROL TARTRATE 15 MCG: 15 SOLUTION RESPIRATORY (INHALATION) at 07:00

## 2025-06-16 RX ADMIN — BISACODYL 5 MG: 5 TABLET, COATED ORAL at 05:30

## 2025-06-16 RX ADMIN — HYDROCHLOROTHIAZIDE 25 MG: 25 TABLET ORAL at 09:23

## 2025-06-16 NOTE — PLAN OF CARE
Goal Outcome Evaluation:   VSS on 2.5L NC. No acute events during shift. Plan of care ongoing.

## 2025-06-16 NOTE — PLAN OF CARE
Goal Outcome Evaluation:Patient being discharged home today

## 2025-06-16 NOTE — CASE MANAGEMENT/SOCIAL WORK
Met with pt who confirms dcp is to return home with his son and daughter. He has portable O2 with him, and his cane. He states estimate is pending on home toilet/bathroom floor, has been using portapotty. Declines need for bsc at [place].

## 2025-06-16 NOTE — CASE MANAGEMENT/SOCIAL WORK
Case Management/Social Work    Patient Name:  Lebron Miller  YOB: 1955  MRN: [id number]  Admit Date:  6/14/2025        09:17 EDT   Discharge Plan       Row Name 06/16/25 0917       Plan    Plan home/son                        Electronically signed by:  Miranda Duval RN  06/16/25 09:17 EDT

## 2025-06-16 NOTE — CASE MANAGEMENT/SOCIAL WORK
Case Management Discharge Note                Selected Continued Care - Admitted Since 6/14/2025       Destination    No services have been selected for the patient.                Durable Medical Equipment    No services have been selected for the patient.                Dialysis/Infusion    No services have been selected for the patient.                Home Medical Care    No services have been selected for the patient.                Therapy    No services have been selected for the patient.                Community Resources    No services have been selected for the patient.                Community & DME    No services have been selected for the patient.                    Selected Continued Care - Episodes Includes continued care and service providers with selected services from the active episodes listed below          Selected Continued Care - Prior Encounters Includes continued care and service providers with selected services from prior encounters from 3/16/2025 to 6/16/2025      Discharged on 5/22/2025 Admission date: 5/19/2025 - Discharge disposition: Home or Self Care      Durable Medical Equipment       Service Provider Services Address Phone Fax Patient Preferred    [facility], Durable Medical Equipment [address] [address] [phone] --              Home Medical Care       Service Provider Services Address Phone Fax Patient Preferred    [facility] [address] [facility], [address] [address] [phone] --                          Transportation Services  Private: Car    Final Discharge Disposition Code: 01 - home or self-care

## 2025-06-16 NOTE — PROGRESS NOTES
"Patient Name: Lebron Miller  YOB: 1955  MRN: [id number]  Admission date: 6/14/2025  Reason for Encounter: Low BMI MST 2-3 or Nursing Admission Screen    [facility] Clinical Nutrition Assessment     Subjective    Subjective Information     6/16: Pt screened for low BMI and MST score of 3. Admitted d/t shortness of breath r/t COPD exacerbation. Average PO intake 94% x 4 meals. Pt has had 5 lb (4%) wt loss in past month. Will prescribe Boost once per day to help with low BMI for age and to prevent further wt loss.      Assessment    H&P and Current Problems      H&P  Past Medical History:   Diagnosis Date    Allergic rhinitis     Arthritis     Hypertension     Myocardial infarction     Psoriasis     Skin cancer       Past Surgical History:   Procedure Laterality Date    CORONARY STENT PLACEMENT        Current Problems   Admission Diagnosis:  COPD with acute exacerbation [J44.1]    Problem List:    COPD with acute exacerbation    Acute-on-chronic respiratory failure      Other Applicable Nutrition Information:        Anthropometrics      BMI, Height, Weight Estimated body mass index is 17.63 kg/m² as calculated from the following:    Height as of this encounter: 170.2 cm (67\").    Weight as of this encounter: 51.1 kg (112 lb 8.7 oz).    Weight Method: Bed scale       Trending Weight Changes weight loss of 5 lbs (4%) over 1 month(s)    Significant?  No       Weight History  Wt Readings from Last 20 Encounters:   06/14/25 2041 51.1 kg (112 lb 8.7 oz)   06/14/25 1316 57.2 kg (126 lb)   06/03/25 1337 56.7 kg (125 lb)   05/19/25 1746 59.4 kg (131 lb)   06/24/24 1828 64.4 kg (142 lb)   03/14/23 1609 66.7 kg (147 lb)   08/30/22 1531 68 kg (150 lb)   03/18/22 1602 68.9 kg (152 lb)   07/26/21 1515 69.4 kg (153 lb)   01/25/21 1558 72.6 kg (160 lb)   07/23/20 0949 72.1 kg (159 lb)   10/11/19 1418 73.5 kg (162 lb)   04/05/19 1515 74.8 kg (165 lb)   09/19/18 1537 74.4 kg (164 lb)   04/24/18 1433 77.7 kg (171 lb 6 " "oz)   02/28/18 1438 76.7 kg (169 lb)   08/31/17 1443 76.2 kg (168 lb)   06/15/17 1515 75.8 kg (167 lb 2 oz)   05/04/17 1106 76.7 kg (169 lb)   12/16/16 1329 77.1 kg (170 lb)   08/17/16 1339 75.2 kg (165 lb 12.8 oz)        Labs      Comment: Labs have been reviewed.     Results from last 7 days   Lab Units 06/15/25  0548 06/14/25  1324   SODIUM mmol/L 137 139   POTASSIUM mmol/L 3.7 4.5   GLUCOSE mg/dL 125* 90   BUN mg/dL 16.0 12.0   CREATININE mg/dL 0.56* 0.69*   CALCIUM mg/dL 8.8 9.0   ALBUMIN g/dL 3.4* 3.8   BILIRUBIN mg/dL 0.4 0.6   ALK PHOS U/L 67 74   AST (SGOT) U/L 13 17   ALT (SGPT) U/L 14 16   PROBNP pg/mL  --  178.0     Results from last 7 days   Lab Units 06/15/25  0548 06/14/25  1324   PLATELETS 10*3/mm3 269 295   HEMOGLOBIN g/dL 13.4 14.2   HEMATOCRIT % 36.8* 40.2     No results found for: \"HGBA1C\"       Medications       Scheduled Medications arformoterol, 15 mcg, Nebulization, BID - RT   And  budesonide, 0.5 mg, Nebulization, BID - RT   And  revefenacin, 175 mcg, Nebulization, Daily - RT  aspirin, 81 mg, Oral, Daily  atorvastatin, 40 mg, Oral, Daily  carvedilol, 25 mg, Oral, BID  clopidogrel, 75 mg, Oral, Daily  enoxaparin sodium, 40 mg, Subcutaneous, Q24H  guaiFENesin, 600 mg, Oral, Q12H  hydroCHLOROthiazide, 25 mg, Oral, Daily  nicotine, 1 patch, Transdermal, Q24H  pantoprazole, 40 mg, Intravenous, Q AM  predniSONE, 40 mg, Oral, Daily With Breakfast  senna-docusate sodium, 2 tablet, Oral, BID        Infusions Pharmacy to Dose enoxaparin (LOVENOX),         PRN Medications   albuterol    senna-docusate sodium **AND** polyethylene glycol **AND** bisacodyl **AND** bisacodyl    ipratropium-albuterol    nicotine polacrilex    Pharmacy to Dose enoxaparin (LOVENOX)    sodium chloride     Physical Findings      Chewing/Swallowing  Teeth Status: Mouth/Teeth WDL: .WDL except, teeth Teeth Symptoms: tooth/teeth missing  Chewing/Swallowing Issues: No issues identified at this time   Edema                          "   Bowel Function  Stool Output  Stool Unmeasured Occurrence: 1 (06/16/25 0900)  Bowel Incontinence: No (06/16/25 0900)  Stool Amount: Large (06/16/25 0900)  Perineal Care: absorbent brief/pad changed, diaper changed, perineum cleansed (06/16/25 0900)  Last Bowel Movement: 06/13/25   I/Os  Intake & Output (last 3 days)         06/13 0701 06/14 0700 06/14 0701  06/15 0700 06/15 0701  06/16 0700 06/16 0701  06/17 0700    P.O.  120 1040 0    IV Piggyback  100      Total Intake(mL/kg)  220 (4.3) 1040 (20.4) 0 (0)    Urine (mL/kg/hr)  100 1075 (0.9)     Total Output  100 1075     Net  +120 -35 0            Urine Unmeasured Occurrence    1 x    Stool Unmeasured Occurrence    1 x           Lines, Drains, Airways, & Wounds       Active LDAs       Name Placement date Placement time Site Days Last dressing change    Peripheral IV 06/14/25 1329 20 G Right Antecubital 06/14/25  1329  Antecubital  1 06/14/25 1329 (44.09 hrs)                      Nutrition Focused Physical Exam     Trending NFPE      Malnutrition Severity Assessment            (1)   Estimated Needs      Energy Requirements    Weight for Calculation 57 kg   Method for Estimation  30-35 kcals/kg   Daily Needs (kcal/day) 6605-6859 kcal   Protein Requirements    Weight for Calculation 57 kg   Method for Estimation 1.2-1.5 gm/kg   Daily Needs (g/day) 68-85 gm pro   Fluid Requirements     Method for Estimation 1 mL/kcal    Daily Needs (mL/day) 6057-7853 mL      Current Nutrition Orders & Evaluation of Intake      Oral Nutrition     Food Allergies  and Intolerances NKFA   Current PO Diet Diet: Regular/House; Fluid Consistency: Thin (IDDSI 0)   Oral Nutrition Supplement None     Trending % PO Intake 94% x 4 meals   (2)  Assessment & Plan   Nutrition Diagnosis and Goals       Nutrition Diagnosis 1 Underweight r/t acute on chronic respiratory failure and COPD AEB low BMI of 19.7.       Nutrition Diagnosis 2         Goal(s) Maintain PO Intake , Meets Estimated Needs ,  Accepts Oral Nutrition Supplement, Tolerates PO Diet , Maintain Weight, and No Significant Weight Loss     Nutrition Intervention and Prescription       Intervention  Start oral nutrition supplement and Continue to monitor for plan of care      Diet Prescription Regular, thin liquids    Supplement Prescription Boost once per day    Education Provided     (3)  Monitoring/Evaluation       Monitor/Evaluation Per Protocol, PO Intake, Oral Nutrition Supplement Intake, Pertinent Labs, and Weight     RD Follow-Up Encounter 1-2 days     Electronically signed by:  Michelle Lowry  06/16/25 09:34 EDT

## 2025-06-30 ENCOUNTER — OFFICE VISIT (OUTPATIENT)
Dept: UROLOGY | Facility: CLINIC | Age: 70
End: 2025-06-30
Payer: MEDICARE

## 2025-06-30 VITALS
RESPIRATION RATE: 18 BRPM | HEIGHT: 66 IN | HEART RATE: 67 BPM | TEMPERATURE: 97.9 F | WEIGHT: 122 LBS | OXYGEN SATURATION: 99 % | BODY MASS INDEX: 19.61 KG/M2

## 2025-06-30 DIAGNOSIS — N28.1 BILATERAL RENAL CYSTS: ICD-10-CM

## 2025-06-30 DIAGNOSIS — K59.00 CONSTIPATION, UNSPECIFIED CONSTIPATION TYPE: ICD-10-CM

## 2025-06-30 DIAGNOSIS — N40.0 ENLARGED PROSTATE: Primary | ICD-10-CM

## 2025-06-30 PROBLEM — J44.1 ACUTE EXACERBATION OF CHRONIC OBSTRUCTIVE PULMONARY DISEASE (COPD): Status: RESOLVED | Noted: 2025-05-20 | Resolved: 2025-06-30

## 2025-06-30 LAB
BILIRUB BLD-MCNC: NEGATIVE MG/DL
CLARITY, POC: ABNORMAL
COLOR UR: YELLOW
EXPIRATION DATE: ABNORMAL
GLUCOSE UR STRIP-MCNC: NEGATIVE MG/DL
KETONES UR QL: NEGATIVE
LEUKOCYTE EST, POC: ABNORMAL
Lab: ABNORMAL
NITRITE UR-MCNC: NEGATIVE MG/ML
PH UR: 7 [PH] (ref 5–8)
PROT UR STRIP-MCNC: NEGATIVE MG/DL
RBC # UR STRIP: ABNORMAL /UL
SP GR UR: 1.02 (ref 1–1.03)
UROBILINOGEN UR QL: NORMAL

## 2025-06-30 NOTE — PROGRESS NOTES
Office Visit     Patient Name: Lebron Miller  : 1955   MRN: [id number]   Patient or patient representative verbalized consent for the use of Ambient Listening during the visit with  SHERIDAN Gallagher for chart documentation. 2025  14:10 EDT    Chief Complaint   Patient presents with    Benign Prostatic Hypertrophy    Establish Care     Referring Provider: Eloy Teixeira MD    Primary Care Provider: Natasha Veronica MD     History of Present Illness  The patient is a 70-year-old male presenting for evaluation of an enlarged prostate.  History significant for COPD and heart disease.      Enlarged Prostate  - Occasional nocturia (1-2 times/night)  - No obstructive symptoms or urinary issues  - PSA level is 2.2, within normal limits, with no concern for prostate cancer    Constipation  - Uses MiraLAX effectively  - Had a satisfactory bowel movement today  - Previous use of Milk of Magnesia was unsuccessful  - Consumes 1-1.5 cups of coffee daily    Supplemental information: No bothersome issues with urinary leakage, incomplete bladder emptying, urinary frequency, straining, hematuria, nocturia, erectile dysfunction, or orgasm. He is on oxygen therapy and has lost weight from 218 to 125 pounds over 14 years.     IPSS Questionnaire (AUA-7):  Incomplete emptying  Over the past month, how often have you had a sensation of not emptying your bladder completely after you finished urinating?: Not at all (25 1359)  Frequency  Over the past month, how often have you had to urinate again less than two hours after you finishied urinating ?: Less than half the time (25 1359)  Intermittency  Over the past month, how often have you found you stopped and started again several time when you urinated ?: Less than 1 time in 5 (25 1359)  Urgency  Over the last month, how often have you found it difficult  have you found it difficult to postpone urination ?: About half the time (25  [facility])  Weak Stream  Over the past month, how often have you had a weak urinary stream ?: Less than half the time (06/30/25 1359)  Straining  Over the past month, how often have you had to push or strain to begin urination ?: Less than 1 time in 5 (06/30/25 [facility]9)  Nocturia  Over the past month, how many times did you most typically get up to urinate from the time you went to bed until the time you got up in the morning ?: 2 times (06/30/25 [facility]9)  Quality of life due to urinary symptoms  If you were to spend the rest of your life with your urinary condition the way it is now, how would feel about that?: Mixed - about equally satisfied (06/30/25 [facility]9)    Scores  Total IPSS Score: (!) 11 (06/30/25 [facility]9)  Total Score = Symptomatic Level: Moderately symptomatic: 8-19 (06/30/25 [facility]9)      Subjective   Review of System: As noted in HPI.    Past Medical History:   Diagnosis Date    Allergic rhinitis     Arthritis     Chronic bronchitis     COPD (chronic obstructive pulmonary disease)     Hypertension     Myocardial infarction     Psoriasis     Skin cancer      Past Surgical History:   Procedure Laterality Date    CORONARY STENT PLACEMENT  2006     Family History   Problem Relation Age of Onset    Stroke Father     COPD Mother     Aneurysm Brother     Stroke Brother     Prostate cancer Neg Hx     Kidney cancer Neg Hx         no bladder cancer     Social History     Socioeconomic History    Marital status:    Tobacco Use    Smoking status: Every Day     Current packs/day: 0.50     Average packs/day: 1 pack/day for 61.1 years (61.0 ttl pk-yrs)     Types: Cigarettes     Start date: 6/3/1964    Smokeless tobacco: Never   Vaping Use    Vaping status: Never Used   Substance and Sexual Activity    Alcohol use: No    Drug use: No    Sexual activity: Defer       Current Outpatient Medications:     albuterol sulfate  (90 Base) MCG/ACT inhaler, Inhale 2 puffs Every 4 (Four) Hours As Needed for wheezing., Disp: 18 g, Rfl:  5    aspirin 81 MG EC tablet, Take 1 tablet by mouth Daily., Disp: 360 tablet, Rfl: 0    Budeson-Glycopyrrol-Formoterol (Breztri Aerosphere) 160-9-4.8 MCG/ACT aerosol inhaler, Inhale 2 puffs by mouth  2 (Two) Times a Day. Rinse mouth after use, Disp: 10.7 g, Rfl: 5    carvedilol (COREG) 25 MG tablet, Take 1 tablet by mouth 2 (Two) Times a Day., Disp: 180 tablet, Rfl: 0    cloNIDine (CATAPRES-TTS) 0.3 MG/24HR patch, Place 1 patch on the skin 1 (One) Time Per Week., Disp: 12 each, Rfl: 1    clopidogrel (PLAVIX) 75 MG tablet, Take 1 tablet by mouth Daily., Disp: 90 tablet, Rfl: 1    Coenzyme Q10 (CoQ10) 100 MG capsule, Take 1 capsule (100 mg total) by mouth daily for 90 days., Disp: 90 capsule, Rfl: 0    famotidine (Heartburn Relief) 10 MG tablet, Take 2 tablets by mouth Daily., Disp: 180 tablet, Rfl: 3    hydroCHLOROthiazide 25 MG tablet, Take 1 tablet by mouth Daily., Disp: 90 tablet, Rfl: 1    ipratropium-albuterol (DUO-NEB) 0.5-2.5 mg/3 ml nebulizer, Inhale contents of 1 vial via nebulizer every 6 (six) hours as needed for wheezing, Disp: 360 mL, Rfl: 11    lisinopril (PRINIVIL,ZESTRIL) 20 MG tablet, Take 1 tablet by mouth Daily for 90 days., Disp: 90 tablet, Rfl: 0    magnesium, as, gluconate (MAGONATE) 500 (27 Mg) MG tablet, Take 1 tablet by mouth Daily., Disp: 90 tablet, Rfl: 0    Misc Natural Products (Tumersaid) tablet, Take 1 capsule by mouth Daily. Tumeric, Disp: , Rfl:     polyethylene glycol (MIRALAX) 17 GM/SCOOP powder, Mix 1 capful (17g) in a noncarbonated beverage of choice and drink Daily., Disp: 850 g, Rfl: 2    simvastatin (ZOCOR) 80 MG tablet, Take 1 tablet by mouth Every Night., Disp: 90 tablet, Rfl: 1    vitamin D (ERGOCALCIFEROL) 1.25 MG (36275 UT) capsule capsule, Take 1 capsule by mouth., Disp: , Rfl:     Cholecalciferol (VITAMIN D) 2000 UNITS tablet, Take 1 tablet by mouth Daily. (Patient not taking: Reported on 6/30/2025), Disp: , Rfl:     No Known Allergies  Objective   Visit Vitals  Pulse  "67   Temp 97.9 °F (36.6 °C) (Temporal)   Resp 18   Ht 168.7 cm (66.42\")   Wt 55.3 kg (122 lb)   SpO2 99%   BMI 19.44 kg/m²      Body mass index is 19.44 kg/m².   Physical Exam  Vitals and nursing note reviewed.   Constitutional:       General: He is not in acute distress.     Appearance: Normal appearance. He is ill-appearing (chronic, frail).   HENT:      Head: Normocephalic and atraumatic.   Pulmonary:      Effort: Pulmonary effort is normal.      Comments: On O2 therapy   Skin:     General: Skin is warm and dry.   Neurological:      General: No focal deficit present.      Mental Status: He is alert and oriented to person, place, and time.      Gait: Gait abnormal (ambulates with rolling walker).   Psychiatric:         Mood and Affect: Mood normal.         Behavior: Behavior normal.     Labs  Lab Results   Component Value Date    COLORU Yellow 06/30/2025    CLARITYU Hazy (A) 06/30/2025    SPECGRAV 1.020 06/30/2025    PHUR 7.0 06/30/2025    LEUKOCYTESUR Moderate (2+) (A) 06/30/2025    NITRITE Negative 06/30/2025    PROTEINPOCUA Negative 06/30/2025    GLUCOSEUR Negative 06/30/2025    KETONESU Negative 06/30/2025    UROBILINOGEN Normal 06/30/2025    BILIRUBINUR Negative 06/30/2025    RBCUR Trace (A) 06/30/2025      Lab Results   Component Value Date    WBCUA 0-2 05/19/2025    RBCUA None Seen 05/19/2025    BACTERIA None Seen 05/19/2025    HYALCASTU None Seen 05/19/2025    SQUAMEPIUA 0-2 05/19/2025        Lab Results   Component Value Date    WBC 6.74 06/15/2025    HGB 13.4 06/15/2025    HCT 36.8 (L) 06/15/2025    MCV 94.1 06/15/2025     06/15/2025     Lab Results   Component Value Date    GLUCOSE 125 (H) 06/15/2025    CALCIUM 8.8 06/15/2025     06/15/2025    K 3.7 06/15/2025    CO2 25.8 06/15/2025     06/15/2025    BUN 16.0 06/15/2025    BUN 12.0 06/14/2025    CREATININE 0.56 (L) 06/15/2025    CREATININE 0.69 (L) 06/14/2025    EGFR 106.0 06/15/2025    EGFR 99.6 06/14/2025    BCR 28.6 (H) 06/15/2025 " "   ANIONGAP 10.2 06/15/2025    ALT 14 06/15/2025    AST 13 06/15/2025     No results found for: \"HGBA1C\"  Lab Results   Component Value Date    PSA 1.740 12/22/2021    PSA 1.940 10/05/2020    PSA 1.620 07/29/2019     No results found for: \"URICACIDSTN\", \"UCZO8GNTFZY\", \"NPEE3KWWTU\", \"LABMAGN\"  Lab Results   Component Value Date    SXCG27JA 42.7 07/29/2019     No results found for: \"CYSTINE\", \"URINEVOLUM\", \"CALCIUMUR\", \"OXALATEU\", \"CITRATEUR\", \"LABPH\", \"URICUR\", \"NAUR\", \"KUR\", \"MAGNESIUMUR\", \"PHOSUR\", \"AMMONIUMUR\", \"CLUR\", \"[id number]\", \"UREAUR\", \"LABCREAUR\"  Lab Results   Component Value Date    PSA 1.740 12/22/2021       Radiographic Studies  XR Chest 1 View  Result Date: 6/15/2025  There are increased interstitial markings which may represent interstitial infiltrates or edema..  Continued followup is recommended.  This report was signed and finalized on 6/15/2025 10:26 AM by Gene Priest DO.      CT Angiogram Chest Pulmonary Embolism  Result Date: 6/14/2025  IMPRESSION: 1. No pulmonary emboli. 2. Emphysematous lungs without acute parenchymal lung disease. 3. Dilatation of the central pulmonary artery may suggest a degree of pulmonary arterial hypertension. 4. Trace anterior pericardial effusion. Authenticated and Electronically Signed by Kevin Gross MD on 06/14/2025 06:13:01 PM    XR Chest 1 View  Result Date: 5/20/2025  No acute cardiopulmonary process.    Images were reviewed, interpreted, and dictated by Dr. Kristen Ayon MD Transcribed by Zita Rogers PA-C.  This report was signed and finalized on 5/20/2025 9:45 AM by Kristen Ayon MD.      CT Angiogram Chest Pulmonary Embolism  Result Date: 5/19/2025  IMPRESSION: 1. No pulmonary embolism. 2. Emphysematous disease. Bronchial thickening bilaterally greatest involving the lung bases. Findings may represent reactive airways disease including bronchitis. No focal consolidation or large pleural effusion. 3. Coronary atherosclerosis. 4. Enlarged main pulmonary " arteries may indicate pulmonary hypertension 5. Multiple sclerotic foci predominantly involving the endplates throughout the thoracic spine. If history of neoplasm recommend nuclear medicine bone scan. Authenticated and Electronically Signed by Eddie Simms DO on 05/19/2025 10:17:58 PM    CT Abdomen Pelvis With Contrast  Result Date: 5/19/2025  IMPRESSION: 1. No evidence for bowel obstruction. Enteric contrast extends to the level of the distal rectum. 2. Scattered colonic diverticula without evidence of diverticulitis. 3. Enlarged prostate measures 6.0 cm in transverse diameter. Recommend correlating with PSA values. 4. Mucosal thickening of the urinary bladder may be related to underdistention or chronic outlet obstruction. Recommend correlating with urinary analysis to exclude cystitis. 5. Focal sclerosis of the superior and inferior endplates of T12 and of the inferior endplate of L1. If history of neoplasm recommend nuclear medicine bone scan. Authenticated and Electronically Signed by Eddie Simms DO on 05/19/2025 10:17:01 PM      I have reviewed the above labs and imaging.     PVR  Post-void residual performed with ultrasound by staff and interpreted by me - 0 mL    Assessment / Plan    Diagnoses and all orders for this visit:    1. Enlarged prostate (Primary)  -     POC Urinalysis Dipstick, Automated    2. Constipation, unspecified constipation type    3. Bilateral renal cysts       Assessment & Plan  1. Enlarged prostate  - PSA 2.2, no immediate concern for prostate cancer  - CT shows enlarged prostate without significant symptoms  - IPSS is 11   - PVR 0 ml    - UA not concerning for infection.   - Discussed risks of doing nothing which include bladder dysfunction, urinary retention and worsening symptoms.    - Encouraged hydration, symptom monitoring, and management options.    - Mr. Miller has decided he does not wish to do anything about his current symptoms as they are not bothersome.   His daughter  agrees with patient's wishes.  Encouraged him to follow up with me if symptoms worsen or he changes his mind about BPH management.      2. Constipation  - CT shows stool in rectum  - Continue MiraLAX  - Increase fluids  - Adjust dosage if diarrhea occurs  - Increase dietary fiber  - Drink decaffeinated liquids    3. Benign kidney cysts  - CT shows large benign cysts in both kidneys.         Return for f/u with Gwen CALLEN new or worsening urologic complaints.    Gwen Larry, MSN, APRN, FNP-C  [facility] [place]

## 2025-07-01 ENCOUNTER — DISEASE STATE MANAGEMENT VISIT (OUTPATIENT)
Dept: PHARMACY | Facility: HOSPITAL | Age: 70
End: 2025-07-01
Payer: MEDICARE

## 2025-07-01 NOTE — PROGRESS NOTES
[facility] Initial Visit Note       Patient Name: Lebron Miller  : 1955   MRN: [id number]   Sex: male  Care Team: Patient Care Team:  Natasha Veronica MD as PCP - General (Family Medicine)     Primary Care Provider:   Referring Provider: No ref. provider found  Encounter Provider: Daxa Hardy, PharmD      COPD Management Visit       History of Present Illness: Lebron Miller is a 70 y.o. male who is on the phone today for follow up evaluation of COPD Management.  He was admitted from  to  for COPD exacerbation and was discharged on 2 L of supplemental oxygen and steroid taper with prednisone. He was also started on 21 mg nicotine patches and nicotine gum.     Today he reports using his albuterol inhaler about once per day and has been using the nicotine patches. He continues to smoke about 10-11 cigarettes a day. He reported that he has not been rinsing his mouth with Breztri use again. We again discussed risks of thrush and need to rinse and spit with water. He voices understanding. He reports that the uses his rescue inhaler about once daily or so. He typically is now always using 2.5L of oxygen daily. He reports that he would rather see Caroline in September and doesn't see the point to come in July. Discussed benefits of the clinic and if he would like to re-enroll he is welcome to call the clinic.    New patient history form is scanned into patient chart under media tab.    Subjective      Past Medical History:   Past Medical History:   Diagnosis Date    Allergic rhinitis     Arthritis     Chronic bronchitis     COPD (chronic obstructive pulmonary disease)     Hypertension     Myocardial infarction     Psoriasis     Skin cancer        Past Surgical History:   Past Surgical History:   Procedure Laterality Date    CORONARY STENT PLACEMENT         Family History:   Family History   Problem Relation Age of Onset    Stroke Father     COPD  Mother     Aneurysm Brother     Stroke Brother     Prostate cancer Neg Hx     Kidney cancer Neg Hx         no bladder cancer       Social History:   Social History     Socioeconomic History    Marital status:    Tobacco Use    Smoking status: Every Day     Current packs/day: 0.50     Average packs/day: 1 pack/day for 61.1 years (61.0 ttl pk-yrs)     Types: Cigarettes     Start date: 6/3/1964    Smokeless tobacco: Never   Vaping Use    Vaping status: Never Used   Substance and Sexual Activity    Alcohol use: No    Drug use: No    Sexual activity: Defer       Tobacco History:   Social History     Tobacco Use   Smoking Status Every Day    Current packs/day: 0.50    Average packs/day: 1 pack/day for 61.1 years (61.0 ttl pk-yrs)    Types: Cigarettes    Start date: 6/3/1964   Smokeless Tobacco Never       Medications:     Current Outpatient Medications:     albuterol sulfate  (90 Base) MCG/ACT inhaler, Inhale 2 puffs Every 4 (Four) Hours As Needed for wheezing., Disp: 18 g, Rfl: 5    aspirin 81 MG EC tablet, Take 1 tablet by mouth Daily., Disp: 360 tablet, Rfl: 0    Budeson-Glycopyrrol-Formoterol (Breztri Aerosphere) 160-9-4.8 MCG/ACT aerosol inhaler, Inhale 2 puffs by mouth  2 (Two) Times a Day. Rinse mouth after use, Disp: 10.7 g, Rfl: 5    carvedilol (COREG) 25 MG tablet, Take 1 tablet by mouth 2 (Two) Times a Day., Disp: 180 tablet, Rfl: 0    Cholecalciferol (VITAMIN D) 2000 UNITS tablet, Take 1 tablet by mouth Daily. (Patient not taking: Reported on 6/30/2025), Disp: , Rfl:     cloNIDine (CATAPRES-TTS) 0.3 MG/24HR patch, Place 1 patch on the skin 1 (One) Time Per Week., Disp: 12 each, Rfl: 1    clopidogrel (PLAVIX) 75 MG tablet, Take 1 tablet by mouth Daily., Disp: 90 tablet, Rfl: 1    Coenzyme Q10 (CoQ10) 100 MG capsule, Take 1 capsule (100 mg total) by mouth daily for 90 days., Disp: 90 capsule, Rfl: 0    famotidine (Heartburn Relief) 10 MG tablet, Take 2 tablets by mouth Daily., Disp: 180 tablet,  Rfl: 3    hydroCHLOROthiazide 25 MG tablet, Take 1 tablet by mouth Daily., Disp: 90 tablet, Rfl: 1    ipratropium-albuterol (DUO-NEB) 0.5-2.5 mg/3 ml nebulizer, Inhale contents of 1 vial via nebulizer every 6 (six) hours as needed for wheezing, Disp: 360 mL, Rfl: 11    lisinopril (PRINIVIL,ZESTRIL) 20 MG tablet, Take 1 tablet by mouth Daily for 90 days., Disp: 90 tablet, Rfl: 0    magnesium, as, gluconate (MAGONATE) 500 (27 Mg) MG tablet, Take 1 tablet by mouth Daily., Disp: 90 tablet, Rfl: 0    Misc Natural Products (Tumersaid) tablet, Take 1 capsule by mouth Daily. Tumeric, Disp: , Rfl:     polyethylene glycol (MIRALAX) 17 GM/SCOOP powder, Mix 1 capful (17g) in a noncarbonated beverage of choice and drink Daily., Disp: 850 g, Rfl: 2    simvastatin (ZOCOR) 80 MG tablet, Take 1 tablet by mouth Every Night., Disp: 90 tablet, Rfl: 1    vitamin D (ERGOCALCIFEROL) 1.25 MG (85371 UT) capsule capsule, Take 1 capsule by mouth., Disp: , Rfl:     Allergies:   No Known Allergies      Spirometry     FEV1 Result: 26%    COPD Classification: GOLD 4, Very Severe; FEV1 <30% predicted    Eosinophil Count:   Eosinophils, Absolute   Date Value Ref Range Status   06/15/2025 0.00 0.00 - 0.40 10*3/mm3 Final       Vaccination Status: Influenza current? Yes (9/24/2024) Pneumococcal current? Yes (3/18/2022)    COPD Symptom Assessment          CAT Score today:   mMRC today:     Exacerbation Assessment     How many times have you been hospitalized this year due to your COPD? 1      Education     Smoking Cessation: provided brief counseling on the benefit of smoking cessation in COPD and how to use nicotine patches and gum.     Visit 2: The following information was reviewed today over telephone  COPD medications/ regimen including adherence, inhaler technique, ADR and other issues concerns  Rescue inhaler use  Rescue Kit  COPD Zones  Patient Education modules (Reviewed Goals, pursed lip breathing, O2 Safety, Smoking Cessation and Specific  inhaler education modules from COPD booklet)  Confirmed Inhaler affordability   Vaccinations/Infection prevention was discussed from COPD booklet  Nebulizer use/care        Pharmacological Treatment Plan     TREATMENT:   Continue Breztri 2 puffs twice daily  Will call patient if Nucala or Dupixent is approved to initiate in clinic.   Patient voiced understanding of how to use each inhaler and will call with any questions or concerns.   Patient voiced understanding of s/sx of exacerbation and will call the clinic within business hours or seek immediate care in ED      Follow-up Treatment Plan: Patient called back and now would like to be seen in the clinic (original phone call, patient stated he did not want to return to the clinic). Patient requires afternoon appointments, therefore scheduled on a Tuesday at 230. Patient's daughter will call if unable to make appointment. Discussed will have Nucala injection/education at that time too.     Daxa Hardy, PharmD  07/01/2025   13:04 EDT

## 2025-07-10 ENCOUNTER — TELEPHONE (OUTPATIENT)
Dept: PHARMACY | Facility: HOSPITAL | Age: 70
End: 2025-07-10

## 2025-07-10 RX ORDER — PREDNISONE 20 MG/1
40 TABLET ORAL DAILY
Qty: 10 TABLET | Refills: 0 | Status: SHIPPED | OUTPATIENT
Start: 2025-07-10

## 2025-07-29 ENCOUNTER — SPECIALTY PHARMACY (OUTPATIENT)
Dept: PHARMACY | Facility: HOSPITAL | Age: 70
End: 2025-07-29
Payer: MEDICARE

## 2025-07-29 ENCOUNTER — DISEASE STATE MANAGEMENT VISIT (OUTPATIENT)
Dept: PHARMACY | Facility: HOSPITAL | Age: 70
End: 2025-07-29
Payer: MEDICARE

## 2025-07-29 VITALS
OXYGEN SATURATION: 96 % | SYSTOLIC BLOOD PRESSURE: 128 MMHG | HEIGHT: 66 IN | DIASTOLIC BLOOD PRESSURE: 75 MMHG | WEIGHT: 121 LBS | BODY MASS INDEX: 19.44 KG/M2 | RESPIRATION RATE: 18 BRPM | HEART RATE: 66 BPM

## 2025-07-29 DIAGNOSIS — I27.20 PULMONARY HYPERTENSION: ICD-10-CM

## 2025-07-29 DIAGNOSIS — F17.210 CIGARETTE NICOTINE DEPENDENCE WITHOUT COMPLICATION: ICD-10-CM

## 2025-07-29 DIAGNOSIS — R68.81 EARLY SATIETY: ICD-10-CM

## 2025-07-29 DIAGNOSIS — J44.9 CHRONIC OBSTRUCTIVE PULMONARY DISEASE, UNSPECIFIED COPD TYPE: Primary | ICD-10-CM

## 2025-07-29 DIAGNOSIS — J96.11 CHRONIC RESPIRATORY FAILURE WITH HYPOXIA: ICD-10-CM

## 2025-07-29 DIAGNOSIS — J44.9 CHRONIC OBSTRUCTIVE PULMONARY DISEASE, UNSPECIFIED COPD TYPE: ICD-10-CM

## 2025-07-29 DIAGNOSIS — R06.02 SHORTNESS OF BREATH: Primary | ICD-10-CM

## 2025-07-29 PROCEDURE — G0463 HOSPITAL OUTPT CLINIC VISIT: HCPCS

## 2025-07-29 RX ORDER — BUDESONIDE, GLYCOPYRROLATE, AND FORMOTEROL FUMARATE 160; 9; 4.8 UG/1; UG/1; UG/1
2 AEROSOL, METERED RESPIRATORY (INHALATION) 2 TIMES DAILY
Qty: 10.7 G | Refills: 11 | Status: SHIPPED | OUTPATIENT
Start: 2025-07-29

## 2025-07-29 RX ORDER — PREDNISONE 20 MG/1
40 TABLET ORAL DAILY
Qty: 10 TABLET | Refills: 0 | Status: SHIPPED | OUTPATIENT
Start: 2025-07-29

## 2025-07-29 NOTE — PROGRESS NOTES
Patient here for first Dupixent injection. Medication approved through insurance but will have a $55 co-pay which is unaffordable to patient. Medication provided via sample today and forms filled out for Dupixent MyWay which will be submitted.     Patient reports that he tolerated last injection well without ADRs.    Dupixent 300 mg administered in clinic today in R upper abdomen.     NDC: 8700-4635-74  LOT: [id number]  EXP: 12/31/26    Patient will complete subsequent injection at home and call clinic with any questions or concerns.     Radha Saleh, PharmD  7/29/2025   17:01 EDT

## 2025-07-29 NOTE — PROGRESS NOTES
Follow Up Office Visit      Patient Name: Lebron Miller    Chief Complaint:  COPD      History of Present Illness: Lebron Miller is a 70 y.o. male who is here today in the [facility] clinic for follow up of COPD. Since last visit, he was admitted to the hospital for a COPD exacerbation and acute on chronic respiratory failure. He was d/c home to complete a course of prednisone. Following that time, he required another course of prednisone and Augmentin earlier this month for exacerbation symptoms. Symptoms are at his current baseline. He reports compliance with Breztri. He is still smoking though wants to quit and has cut back a lot. He says that he has nicotine patches and gum at home which he intends to use to aid in cessation efforts.    Severity: Severe dyspnea  Timing: Daily  Context: Minimal exertion  Associated Symptoms: Chronic cough intermittently productive of sputum, wheezing, early satiety, occasional ankle swelling which is mild, no chest pain.  Modifying Factors: Dyspnea is worse with exertion, eating, and lying down. Improves with rest, inhalers, oxygen, steroids, sitting upright.  Supplemental Oxygen: 2.5L NC, DME is [employer]  Cardiac History: CAD s/p stent in the remote past, no longer follows with cardiology  Last Hospitalization: June 2025  Last Steroids: July 2025    Subjective      Review of Systems:  Review of Systems   Constitutional:  Negative for fever and unexpected weight change.   Respiratory:  Positive for cough, shortness of breath and wheezing.    Cardiovascular:  Negative for chest pain.        Past Medical History:   Past Medical History:   Diagnosis Date    Allergic rhinitis     Arthritis     Chronic bronchitis     COPD (chronic obstructive pulmonary disease)     Hypertension     Myocardial infarction     Psoriasis     Skin cancer        Past Surgical History:   Past Surgical History:   Procedure Laterality Date    CORONARY STENT PLACEMENT  2006       Family History:   Family History    Problem Relation Age of Onset    Stroke Father     COPD Mother     Aneurysm Brother     Stroke Brother     Prostate cancer Neg Hx     Kidney cancer Neg Hx         no bladder cancer       Social History:   Social History     Socioeconomic History    Marital status:    Tobacco Use    Smoking status: Every Day     Current packs/day: 0.50     Average packs/day: 1 pack/day for 61.2 years (61.0 ttl pk-yrs)     Types: Cigarettes     Start date: 6/3/1964    Smokeless tobacco: Never   Vaping Use    Vaping status: Never Used   Substance and Sexual Activity    Alcohol use: No    Drug use: No    Sexual activity: Defer       Current Medications:     Current Outpatient Medications:     albuterol sulfate  (90 Base) MCG/ACT inhaler, Inhale 2 puffs Every 4 (Four) Hours As Needed for wheezing., Disp: 18 g, Rfl: 5    aspirin 81 MG EC tablet, Take 1 tablet by mouth Daily., Disp: 360 tablet, Rfl: 0    Budeson-Glycopyrrol-Formoterol (Breztri Aerosphere) 160-9-4.8 MCG/ACT aerosol inhaler, Inhale 2 puffs by mouth  2 (Two) Times a Day. Rinse mouth after use, Disp: 10.7 g, Rfl: 5    carvedilol (COREG) 25 MG tablet, Take 1 tablet by mouth 2 (Two) Times a Day., Disp: 180 tablet, Rfl: 0    carvedilol (COREG) 25 MG tablet, Take 1 tablet by mouth Every 12 (Twelve) Hours., Disp: 180 tablet, Rfl: 0    carvedilol (COREG) 25 MG tablet, Take 1 tablet (25 mg total) by mouth 2 (two) times daily., Disp: 180 tablet, Rfl: 0    Cholecalciferol (VITAMIN D) 2000 UNITS tablet, Take 1 tablet by mouth Daily., Disp: , Rfl:     cloNIDine (CATAPRES-TTS) 0.3 MG/24HR patch, Place 1 patch on the skin 1 (One) Time Per Week., Disp: 12 each, Rfl: 1    clopidogrel (PLAVIX) 75 MG tablet, Take 1 tablet by mouth Daily., Disp: 90 tablet, Rfl: 1    Coenzyme Q10 (CoQ10) 100 MG capsule, Take 1 capsule (100 mg total) by mouth daily for 90 days., Disp: 90 capsule, Rfl: 0    famotidine (Heartburn Relief) 10 MG tablet, Take 2 tablets by mouth Daily., Disp: 180  "tablet, Rfl: 3    hydroCHLOROthiazide 25 MG tablet, Take 1 tablet by mouth Daily., Disp: 90 tablet, Rfl: 1    ipratropium-albuterol (DUO-NEB) 0.5-2.5 mg/3 ml nebulizer, Inhale contents of 1 vial via nebulizer every 6 (six) hours as needed for wheezing, Disp: 360 mL, Rfl: 11    lisinopril (PRINIVIL,ZESTRIL) 20 MG tablet, Take 1 tablet by mouth Daily for 90 days., Disp: 90 tablet, Rfl: 0    magnesium, as, gluconate (MAGONATE) 500 (27 Mg) MG tablet, Take 1 tablet by mouth Daily., Disp: 90 tablet, Rfl: 0    Misc Natural Products (Tumersaid) tablet, Take 1 capsule by mouth Daily. Tumeric, Disp: , Rfl:     polyethylene glycol (MIRALAX) 17 GM/SCOOP powder, Mix 1 capful (17g) in a noncarbonated beverage of choice and drink Daily., Disp: 850 g, Rfl: 2    simvastatin (ZOCOR) 80 MG tablet, Take 1 tablet by mouth Every Night., Disp: 90 tablet, Rfl: 1    vitamin D (ERGOCALCIFEROL) 1.25 MG (74800 UT) capsule capsule, Take 1 capsule by mouth., Disp: , Rfl:      Allergies:   No Known Allergies    Objective     Physical Exam:  Vital Signs:   Vitals:    07/29/25 1436   BP: 128/75   Pulse: 66   Resp: 18   SpO2: 96%  Comment: on room air   Weight: 54.9 kg (121 lb)   Height: 168.7 cm (66.42\")   ============================  ============================    6 MINUTE WALK TEST    Lebron Miller   1955             BASELINE   SpO2%: 96 % RA    Heart Rate 66   Blood Pressure 128/75     EXERCISE SpO2% HEART RATE RA or O2 @ LPM   1 MINUTE 83 68 Ra add 2lpm   2 MINUTES 97 67 2lpm   3 MINUTES      4 MINUTES      5 MINUTES      6 MINUTES      Total distance: 170 meters           Distance Walked:  170 Meters   SpO2% Post Exercise:  99 % 2lpm   HR Post Exercise:  89     Reason to stop (if applicable):   ____ Chest Pain   ____ Light Headedness   ____ Dyspnea Unrelieved by Rest   ____ Abnormal Gait Pattern   ____ Severe Fatigue   ____ Other (Specify: __________________________)    Tech Comments (if any): patient stopped at minute 1 when oxygen was " added and set down. He does not want to continue walk.      Test performed by: RR    ============================  ============================   Body mass index is 19.28 kg/m².    Physical Exam  Vitals reviewed.   Constitutional:       General: He is not in acute distress.     Appearance: He is not toxic-appearing.   HENT:      Head: Normocephalic and atraumatic.      Mouth/Throat:      Mouth: Mucous membranes are moist.   Eyes:      Extraocular Movements: Extraocular movements intact.      Conjunctiva/sclera: Conjunctivae normal.   Cardiovascular:      Rate and Rhythm: Normal rate.      Heart sounds: Normal heart sounds.   Pulmonary:      Effort: Pulmonary effort is normal.      Breath sounds: Normal breath sounds.   Abdominal:      General: There is no distension.      Palpations: Abdomen is soft.   Musculoskeletal:         General: No swelling.      Cervical back: Neck supple.   Skin:     General: Skin is warm and dry.      Findings: No rash.   Neurological:      General: No focal deficit present.      Mental Status: He is alert and oriented to person, place, and time.   Psychiatric:         Mood and Affect: Mood normal.         Behavior: Behavior normal.       Results Review:   6/14/25 absolute eosinophils 190.    6/14/25 CTA chest showed no pulmonary emboli. Emphysematous lungs without acute parenchymal lung disease. Dilatation of the central pulmonary artery may suggest a degree of pulmonary arterial hypertension. Trace anterior pericardial effusion.    Office spirometry obtained today showed FEV1 of 26%, FEV1/FVC ratio 41.     Assessment / Plan      Assessment/Plan:   Diagnoses and all orders for this visit:    1. Shortness of breath (Primary)  -     6 Minute Walk Test; Future  Appears to be at his current baseline. Continue on supplemental O2 per walk test performed today. Recent hospitalization records reviewed.    2. Chronic obstructive pulmonary disease, unspecified COPD type  -     predniSONE  (DELTASONE) 20 MG tablet; Take 2 tablets by mouth Daily.  Dispense: 10 tablet; Refill: 0  -     Budeson-Glycopyrrol-Formoterol (Breztri Aerosphere) 160-9-4.8 MCG/ACT aerosol inhaler; Inhale 2 puffs by mouth  2 (Two) Times a Day. Rinse mouth after use  Dispense: 10.7 g; Refill: 11  He appears to have end-stage disease. Very severe reduction in FEV1 per spirometry obtained at last clinic visit. Continue current inhaled regimen. We discussed the risk and benefits of inhaled corticosteroids. Patient instructed to take them on a regular basis as prescribed. Patient instructed to rinse their mouth out after each use. Case discussed with [facility] clinic pharmacist. He will be initiated on Dupixent injections today by the pharmacist in attempts to improve symptoms and reduce exacerbation frequency; Dupixent MyWay forms were signed today. Full PFTs pending prior to next clinic visit. Will send in a course of prednisone for him to have on hand in the event of another exacerbation. Can consider adding Ohtuvayre at next visit. A1AT genotype result is still pending.    3. Chronic respiratory failure with hypoxia  Rx written for portable oxygen concentrator for improved mobility; he will further discuss this with [facility] and decide if he wishes to begin use of a POC if able based on insurance/out of pocket cost vs continue using portable tanks.     4. Pulmonary hypertension  This has been suggested on chest imaging, and is likely due to his underlying lung disease. We discussed risks associated with PH. Recommended further evaluation with an echocardiogram though he declines to proceed with any other testing at this time though was advised to notify the clinic if he changes his mind. Continue optimal treatment of his COPD.    5. Cigarette nicotine dependence without complication  Complete cessation is advised. He already has nicotine patches & gum and declines use of other pharmacologic cessation agents at this time. He was again  advised not to smoke in the presence of supplemental O2 due to risk of fire/explosion and associated harm to himself or others and he voiced understanding.    6. Early satiety  Secondary to the severity of his obstructive lung disease. He was again advised to eat small, frequent, high calorie foods / meals throughout the day in attempts to maintain his weight.       I spent 45 minutes caring for Lebron on this date of service. This time includes time spent by me in the following activities: preparing for the visit, reviewing tests, obtaining and/or reviewing a separately obtained history, performing a medically appropriate examination and/or evaluation, counseling and educating the patient/family/caregiver, ordering medications, tests, or procedures, referring and communicating with other health care professionals, documenting information in the medical record, independently interpreting results and communicating that information with the patient/family/caregiver, and care coordination.       Follow Up:   September 2025 as previously scheduled  The patient was counseled on diagnostic results, risks and benefits of treatment options, risk factor modifications and the importance of treatment compliance. The patient was advised to contact the clinic with concerns or worsening symptoms.     SHERIDAN Galloway   Pulmonary Medicine [place]     This document has been electronically signed by SHERIDAN Galloway  July 29, 2025

## 2025-07-30 RX ORDER — DUPILUMAB 300 MG/2ML
300 INJECTION, SOLUTION SUBCUTANEOUS
Qty: 4 ML | Refills: 0 | COMMUNITY
Start: 2025-07-30

## 2025-07-30 NOTE — PROGRESS NOTES
"              [facility] Follow Up Note       Patient Name: Lebron Miller  : 1955   MRN: [id number]   Sex: male  Care Team: Patient Care Team:  Natasha Veronica MD as PCP - General (Family Medicine)     Primary Care Provider:   Referring Provider: Caroline Kilpatrick APRN  Encounter Provider: SHERIDAN Galloway      COPD Management Visit     History of Present Illness: Lebron Miller is a 70 y.o. male who is here today for revaluation of COPD Management. Last time in clinic, he mentioned he was not rinsing his mouth after using his Breztri. He was also using his rescue about once a day. Since that visit, he was admitted to the hospital for a COPD exacerbation and discharged with a course of prednisone. He required another course of prednisone and Augmentin this month as well for exacerbation symptoms.     Today in clinic, Mr. Miller notes he feels like he is in the yellow zone. He mentions he has only been using his rescue inhaler when his SOB is very extreme. I told him he should not wait until he feels like he is suffocating and use it every 4 hours as much as he needs it. However, he does use his nebulizer daily. He notes after he uses his Breztri, he rinses his mouth out with coffee in which I recommended water instead. He is still smoking but expresses he has \"cut back\" and that he has nicotine patches and gum at home. Otherwise, he offers no other concerns at this time.     Subjective      Past Medical History:   Past Medical History:   Diagnosis Date    Allergic rhinitis     Arthritis     Chronic bronchitis     COPD (chronic obstructive pulmonary disease)     Hypertension     Myocardial infarction     Psoriasis     Skin cancer        Past Surgical History:   Past Surgical History:   Procedure Laterality Date    CORONARY STENT PLACEMENT         Family History:   Family History   Problem Relation Age of Onset    Stroke Father     COPD Mother     Aneurysm Brother "     Stroke Brother     Prostate cancer Neg Hx     Kidney cancer Neg Hx         no bladder cancer       Social History:   Social History     Socioeconomic History    Marital status:    Tobacco Use    Smoking status: Every Day     Current packs/day: 0.50     Average packs/day: 1 pack/day for 61.2 years (61.0 ttl pk-yrs)     Types: Cigarettes     Start date: 6/3/1964    Smokeless tobacco: Never   Vaping Use    Vaping status: Never Used   Substance and Sexual Activity    Alcohol use: No    Drug use: No    Sexual activity: Defer       Tobacco History:   Social History     Tobacco Use   Smoking Status Every Day    Current packs/day: 0.50    Average packs/day: 1 pack/day for 61.2 years (61.0 ttl pk-yrs)    Types: Cigarettes    Start date: 6/3/1964   Smokeless Tobacco Never       Medications:     Current Outpatient Medications:     albuterol sulfate  (90 Base) MCG/ACT inhaler, Inhale 2 puffs Every 4 (Four) Hours As Needed for wheezing., Disp: 18 g, Rfl: 5    aspirin 81 MG EC tablet, Take 1 tablet by mouth Daily., Disp: 360 tablet, Rfl: 0    Budeson-Glycopyrrol-Formoterol (Breztri Aerosphere) 160-9-4.8 MCG/ACT aerosol inhaler, Inhale 2 puffs by mouth  2 (Two) Times a Day. Rinse mouth after use, Disp: 10.7 g, Rfl: 11    carvedilol (COREG) 25 MG tablet, Take 1 tablet (25 mg total) by mouth 2 (two) times daily., Disp: 180 tablet, Rfl: 0    Cholecalciferol (VITAMIN D) 2000 UNITS tablet, Take 1 tablet by mouth Daily., Disp: , Rfl:     cloNIDine (CATAPRES-TTS) 0.3 MG/24HR patch, Place 1 patch on the skin 1 (One) Time Per Week., Disp: 12 each, Rfl: 1    clopidogrel (PLAVIX) 75 MG tablet, Take 1 tablet by mouth Daily., Disp: 90 tablet, Rfl: 1    Coenzyme Q10 (CoQ10) 100 MG capsule, Take 1 capsule (100 mg total) by mouth daily for 90 days., Disp: 90 capsule, Rfl: 0    famotidine (Heartburn Relief) 10 MG tablet, Take 2 tablets by mouth Daily., Disp: 180 tablet, Rfl: 3    hydroCHLOROthiazide 25 MG tablet, Take 1 tablet  "by mouth Daily., Disp: 90 tablet, Rfl: 1    ipratropium-albuterol (DUO-NEB) 0.5-2.5 mg/3 ml nebulizer, Inhale contents of 1 vial via nebulizer every 6 (six) hours as needed for wheezing, Disp: 360 mL, Rfl: 11    lisinopril (PRINIVIL,ZESTRIL) 20 MG tablet, Take 1 tablet by mouth Daily for 90 days., Disp: 90 tablet, Rfl: 0    magnesium, as, gluconate (MAGONATE) 500 (27 Mg) MG tablet, Take 1 tablet by mouth Daily., Disp: 90 tablet, Rfl: 0    Misc Natural Products (Tumersaid) tablet, Take 1 capsule by mouth Daily. Tumeric, Disp: , Rfl:     polyethylene glycol (MIRALAX) 17 GM/SCOOP powder, Mix 1 capful (17g) in a noncarbonated beverage of choice and drink Daily., Disp: 850 g, Rfl: 2    simvastatin (ZOCOR) 80 MG tablet, Take 1 tablet by mouth Every Night., Disp: 90 tablet, Rfl: 1    vitamin D (ERGOCALCIFEROL) 1.25 MG (73138 UT) capsule capsule, Take 1 capsule by mouth., Disp: , Rfl:     Dupilumab (Dupixent) 300 MG/2ML solution auto-injector injection, Inject 2 mL under the skin into the appropriate area as directed Every 14 (Fourteen) Days., Disp: 4 mL, Rfl: 0    predniSONE (DELTASONE) 20 MG tablet, Take 2 tablets by mouth Daily., Disp: 10 tablet, Rfl: 0  No current facility-administered medications for this visit.    Allergies:   No Known Allergies    Spirometry     FEV1 Result: 26%    COPD Classification: GOLD 4, Very Severe; FEV1 <30% predicted    Eosinophil Count:   Eosinophils, Absolute   Date Value Ref Range Status   06/15/2025 0.00 0.00 - 0.40 10*3/mm3 Final       Vaccination Status: Influenza current? Yes (9/24/24)   Pneumococcal current? Yes (3/18/22)    COPD Symptom Assessment     CAT Score today:   mMRC today:     Exacerbation Assessment     Has patient been hospitalized since last visit? No      Education     Smoking Cessation: Patient is still smoking, but has \"cut back a lot.\" He currently has nicotine patches and gum at home.     Visit 4: The following information was reviewed today  Reviewed history, " questionnaires, scores, past and current COPD regimen  Inhaler adherence and inhaler technique  Inhaler affordability  COPD Zones    Patient will follow up with pulmonary office.    Assessment & Plan     Treatment:    Start Dupixent 300 mg SQ today in clinic.  Continue Breztri 160--9-4.8 MCG/ACT 2 puffs twice daily.  Continue Albuterol rescue and Duo-nebs PRN.   Patient voiced understanding of s/sx of exacerbation and will call the clinic within business hours or seek immediate care in ED.    Follow Up     Patient will follow up with SHERIDAN Galloway 9/25 in pulmonology clinic.       Gissel Bañuelos, Pharmacy Intern  07/29/2025  08:59 EDT

## 2025-07-30 NOTE — PROGRESS NOTES
Specialty Pharmacy Patient Management Program  Pulmonology Initial Assessment     Lebron Miller was referred by their provider to the Pulmonology Patient Management program offered by [facility] for COPD on 7/29/25.  An initial outreach was conducted, including assessment of therapy appropriateness and specialty medication education for Dupixent.      The patient was introduced to services offered by [facility], including: regular assessments, refill coordination, curbside pick-up or mail order delivery options, prior authorization maintenance, and financial assistance programs as applicable. The patient was also provided with contact information for the pharmacy team.     Insurance Coverage & Financial Support  [employer] Medicare Advantage: Co-Pay of $55 which in unaffordable to patient so first month of Dupixent doses was supplied via sample and forms to be submitted for Dupixent [employer]     Relevant Past Medical History and Comorbidities  Relevant medical history and concomitant health conditions were discussed with the patient. The patient's chart has been reviewed for relevant past medical history and comorbid health conditions and updated as necessary.   Past Medical History:   Diagnosis Date    Allergic rhinitis     Arthritis     Chronic bronchitis     COPD (chronic obstructive pulmonary disease)     Hypertension     Myocardial infarction     Psoriasis     Skin cancer      Social History     Socioeconomic History    Marital status:    Tobacco Use    Smoking status: Every Day     Current packs/day: 0.50     Average packs/day: 1 pack/day for 61.2 years (61.0 ttl pk-yrs)     Types: Cigarettes     Start date: 6/3/1964    Smokeless tobacco: Never   Vaping Use    Vaping status: Never Used   Substance and Sexual Activity    Alcohol use: No    Drug use: No    Sexual activity: Defer     Problem list reviewed by Radha Salhe RPH on 7/30/2025 at  8:52  AM    Allergies  Known allergies and reactions were discussed with the patient. The patient's chart has been reviewed for  allergy information and updated as necessary.   No Known Allergies  Allergies reviewed by Radha Saleh RP on 7/30/2025 at  8:50 AM    Relevant Laboratory Values  Common labs          5/22/2025    06:03 6/14/2025    13:24 6/15/2025    05:48   Common Labs   Glucose 115  90  125    BUN 26  12.0  16.0    Creatinine 0.59  0.69  0.56    Sodium 134  139  137    Potassium 4.0  4.5  3.7    Chloride 98  101  101    Calcium 8.3  9.0  8.8    Albumin  3.8  3.4    Total Bilirubin  0.6  0.4    Alkaline Phosphatase  74  67    AST (SGOT)  17  13    ALT (SGPT)  16  14    WBC 14.31  6.35  6.74    Hemoglobin 15.4  14.2  13.4    Hematocrit 43.4  40.2  36.8    Platelets 310  295  269        Lab Assessment  The above labs have been reviewed. No dose adjustments are needed for the specialty medication(s) based on the labs.     Current Medication List  This medication list has been reviewed with the patient and evaluated for any interactions or necessary modifications/recommendations, and updated to include all prescription medications, OTC medications, and supplements the patient is currently taking.  This list reflects what is contained in the patient's profile, which has also been marked as reviewed to communicate to other providers it is the most up to date version of the patient's current medication therapy.     Current Outpatient Medications:     albuterol sulfate  (90 Base) MCG/ACT inhaler, Inhale 2 puffs Every 4 (Four) Hours As Needed for wheezing., Disp: 18 g, Rfl: 5    aspirin 81 MG EC tablet, Take 1 tablet by mouth Daily., Disp: 360 tablet, Rfl: 0    Budeson-Glycopyrrol-Formoterol (Breztri Aerosphere) 160-9-4.8 MCG/ACT aerosol inhaler, Inhale 2 puffs by mouth  2 (Two) Times a Day. Rinse mouth after use, Disp: 10.7 g, Rfl: 11    carvedilol (COREG) 25 MG tablet, Take 1 tablet (25 mg total) by  mouth 2 (two) times daily., Disp: 180 tablet, Rfl: 0    Cholecalciferol (VITAMIN D) 2000 UNITS tablet, Take 1 tablet by mouth Daily., Disp: , Rfl:     cloNIDine (CATAPRES-TTS) 0.3 MG/24HR patch, Place 1 patch on the skin 1 (One) Time Per Week., Disp: 12 each, Rfl: 1    clopidogrel (PLAVIX) 75 MG tablet, Take 1 tablet by mouth Daily., Disp: 90 tablet, Rfl: 1    Coenzyme Q10 (CoQ10) 100 MG capsule, Take 1 capsule (100 mg total) by mouth daily for 90 days., Disp: 90 capsule, Rfl: 0    Dupilumab (Dupixent) 300 MG/2ML solution auto-injector injection, Inject 2 mL under the skin into the appropriate area as directed Every 14 (Fourteen) Days., Disp: 4 mL, Rfl: 0    famotidine (Heartburn Relief) 10 MG tablet, Take 2 tablets by mouth Daily., Disp: 180 tablet, Rfl: 3    hydroCHLOROthiazide 25 MG tablet, Take 1 tablet by mouth Daily., Disp: 90 tablet, Rfl: 1    ipratropium-albuterol (DUO-NEB) 0.5-2.5 mg/3 ml nebulizer, Inhale contents of 1 vial via nebulizer every 6 (six) hours as needed for wheezing, Disp: 360 mL, Rfl: 11    lisinopril (PRINIVIL,ZESTRIL) 20 MG tablet, Take 1 tablet by mouth Daily for 90 days., Disp: 90 tablet, Rfl: 0    magnesium, as, gluconate (MAGONATE) 500 (27 Mg) MG tablet, Take 1 tablet by mouth Daily., Disp: 90 tablet, Rfl: 0    Misc Natural Products (Tumersaid) tablet, Take 1 capsule by mouth Daily. Tumeric, Disp: , Rfl:     polyethylene glycol (MIRALAX) 17 GM/SCOOP powder, Mix 1 capful (17g) in a noncarbonated beverage of choice and drink Daily., Disp: 850 g, Rfl: 2    predniSONE (DELTASONE) 20 MG tablet, Take 2 tablets by mouth Daily., Disp: 10 tablet, Rfl: 0    simvastatin (ZOCOR) 80 MG tablet, Take 1 tablet by mouth Every Night., Disp: 90 tablet, Rfl: 1    vitamin D (ERGOCALCIFEROL) 1.25 MG (83694 UT) capsule capsule, Take 1 capsule by mouth., Disp: , Rfl:   No current facility-administered medications for this visit.    Medicines reviewed by Radha Saleh RP on 7/30/2025 at  8:51  AM  Medicines reviewed by Radha Saleh RPH on 7/30/2025 at  8:52 AM  Medicines reviewed by Radha Saleh RPH on 7/30/2025 at  8:52 AM    Drug Interactions  N/A     Initial Education Provided for Specialty Medication  The patient has been provided with the following education and any applicable administration techniques (i.e. self-injection) have been demonstrated for the therapies indicated. All questions and concerns have been addressed prior to the patient receiving the medication, and the patient has verbalized understanding of the education and any materials provided.  Additional patient education shall be provided and documented upon request by the patient, provider or payer.      Initial Education Provided for Dupixent  The patient has been provided with the following education for Dupixent. All questions and concerns have been addressed prior to the patient receiving the medication, and the patient has verbalized understanding of the education and any materials provided. Additional patient education shall be provided and documented upon request by the patient, provider or payer.      The following counseling points for Dupixent (dupilumab) were addressed:  Goal of treatment:  This medication is used for the maintenance treatment of severe asthma in combination with other asthma medicines. The goal of treatment is to improve your breathing and prevent severe asthma attacks by decreasing the release of inflammatory substances in your body.  Dupixent is not a rescue treatment  Directions for use:  Dupixent is injected under your skin (subcutaneously) once every other week (every 14 days). You will receive your first injection in clinic. The injection is given into one of the following areas: thigh, abdomen (at least 2 inches away from navel), or back of upper arm if given by someone else. Remove medication from the refrigerator and allow to sit at room temperature for 30-45 minutes prior to  injection. Avoid administration in skin that is tender, bruised, red, or hard.  If you miss a dose, give the injection as soon as you think about it. If it has been 7 days or more since the missed dose, skip the missed dose and go back to your normal time.   Step-by-step administration education provided in clinic while giving first injection  Adverse effects:  Possible side effects of this medication include: injection site reaction, throat pain, cold sores, signs of common cold  Even though it may be rare, some people may have severe side effects when taking a medication. Go to the ED or call 911 right away if you have any of the following: signs of an allergic reaction like rash; hives; wheezing; chest tightness; trouble breathing, swallowing, or talking; swelling of the mouth, face, lips, tongue, or throat. Contact clinic if you have any new or worsening eye problems such as eye pain or changes in vision. Rarely, this medication can cause inflammation of your blood vessels. Contact clinic ASAP if you have rash, chest pain, shortness of breath, persistent fever, or a feeling of pins/needles or numbness in your arms or legs.  Other considerations:  Helminth infections: it is unknown if Dupixent will influence the immune response against parasitic infections. If you have a pre-existing helminth infection, this should be treated prior to starting Dupixent. If you become infected while taking Dupixent and do not respond to anti-helminth treatment, Dupixent will need to be discontinued until the infection resolves.  Vaccines: it is recommended that you are up to date with all immunizations before starting Dupixent. Alert your doctors and pharmacist that you are taking Dupixent prior to getting any vaccines. Live attenuated vaccines should be avoided while you are treated with Dupixent (MMR, chickenpox, yellow fever, Zostavax*)  *Shingrix recommended for shingles vaccination as this is not a live  vaccine    Storage/Disposal  Store in the refrigerator in the original carton until it is time to use. If needed, you may store at room temperature for up to 14 days. Do not freeze or shake.  You can dispose of the empty injector in a sharps container. If this is not available, you may use an empty hard plastic container such as a milk jug or tide container.    Identified barriers to adherence/administration: N/A        Adherence and Self-Administration  Adherence related to the patient's specialty therapy was discussed with the patient. The Adherence segment of this outreach has been reviewed and updated.   Is there a concern with patient's ability to self administer the medication correctly and without issue?: No  Were any potential barriers to adherence identified during the initial assessment or patient education?: No  Are there any concerns regarding the patient's understanding of the importance of medication adherence?: No  Methods for Supporting Patient Adherence and/or Self-Administration: Patient's daughter will administer patients Dupixent injection     Goals of Therapy  Goals related to the patient's specialty therapy were discussed with the patient. The Patient Goals segment of this outreach has been reviewed and updated.   Goals Addressed Today    None          Reassessment Plan & Follow-Up  Medication Therapy Changes: N/A  Related Plans, Therapy Recommendations, or Therapy Problems to Be Addressed: First month supply of Dupixent provided to patient today via sample and forms for Dupixent MyWay to be submitted once daughter provides patient's income which is required for application   First Dupixent 300 mg injection given to patient in clinic today and he was monitored for 15 minutes after administration with no injection site reactions or ADRs seen. Please see separate DSM encounter from same day for injection details.   Pharmacist to perform regular reassessments no more than (6) months from the  previous assessment.  Care Coordinator to set up future refill outreaches, coordinate prescription delivery, and escalate clinical questions to pharmacist.       Attestation  Therapeutic appropriateness: Appropriate   I attest the patient was actively involved in and has agreed to the above plan of care. If the prescribed therapy is at any point deemed not appropriate based on the current or future assessments, a consultation will be initiated with the patient's specialty care provider to determine the best course of action. The revised plan of therapy will be documented along with any additional patient education provided. Discussed aforementioned material with patient in person.    Radha Saleh, VeenaD  7/30/2025   09:03 EDT